# Patient Record
Sex: MALE | Race: WHITE | NOT HISPANIC OR LATINO | URBAN - METROPOLITAN AREA
[De-identification: names, ages, dates, MRNs, and addresses within clinical notes are randomized per-mention and may not be internally consistent; named-entity substitution may affect disease eponyms.]

---

## 2020-01-01 ENCOUNTER — INPATIENT (INPATIENT)
Facility: HOSPITAL | Age: 70
LOS: 12 days | DRG: 870 | End: 2020-01-16
Attending: INTERNAL MEDICINE | Admitting: INTERNAL MEDICINE
Payer: MEDICARE

## 2020-01-01 VITALS
DIASTOLIC BLOOD PRESSURE: 96 MMHG | SYSTOLIC BLOOD PRESSURE: 133 MMHG | RESPIRATION RATE: 26 BRPM | WEIGHT: 300.05 LBS | OXYGEN SATURATION: 92 % | HEIGHT: 71 IN | HEART RATE: 112 BPM | TEMPERATURE: 99 F

## 2020-01-01 DIAGNOSIS — I48.91 UNSPECIFIED ATRIAL FIBRILLATION: ICD-10-CM

## 2020-01-01 DIAGNOSIS — Z72.89 OTHER PROBLEMS RELATED TO LIFESTYLE: ICD-10-CM

## 2020-01-01 DIAGNOSIS — R53.81 OTHER MALAISE: ICD-10-CM

## 2020-01-01 DIAGNOSIS — A41.9 SEPSIS, UNSPECIFIED ORGANISM: ICD-10-CM

## 2020-01-01 DIAGNOSIS — D72.819 DECREASED WHITE BLOOD CELL COUNT, UNSPECIFIED: ICD-10-CM

## 2020-01-01 DIAGNOSIS — E87.2 ACIDOSIS: ICD-10-CM

## 2020-01-01 DIAGNOSIS — R74.0 NONSPECIFIC ELEVATION OF LEVELS OF TRANSAMINASE AND LACTIC ACID DEHYDROGENASE [LDH]: ICD-10-CM

## 2020-01-01 DIAGNOSIS — D69.6 THROMBOCYTOPENIA, UNSPECIFIED: ICD-10-CM

## 2020-01-01 DIAGNOSIS — J96.02 ACUTE RESPIRATORY FAILURE WITH HYPERCAPNIA: ICD-10-CM

## 2020-01-01 DIAGNOSIS — I50.9 HEART FAILURE, UNSPECIFIED: ICD-10-CM

## 2020-01-01 DIAGNOSIS — R94.5 ABNORMAL RESULTS OF LIVER FUNCTION STUDIES: ICD-10-CM

## 2020-01-01 DIAGNOSIS — G93.40 ENCEPHALOPATHY, UNSPECIFIED: ICD-10-CM

## 2020-01-01 DIAGNOSIS — J44.1 CHRONIC OBSTRUCTIVE PULMONARY DISEASE WITH (ACUTE) EXACERBATION: ICD-10-CM

## 2020-01-01 DIAGNOSIS — F10.239 ALCOHOL DEPENDENCE WITH WITHDRAWAL, UNSPECIFIED: ICD-10-CM

## 2020-01-01 DIAGNOSIS — R91.8 OTHER NONSPECIFIC ABNORMAL FINDING OF LUNG FIELD: ICD-10-CM

## 2020-01-01 DIAGNOSIS — C79.9 SECONDARY MALIGNANT NEOPLASM OF UNSPECIFIED SITE: ICD-10-CM

## 2020-01-01 DIAGNOSIS — K92.1 MELENA: ICD-10-CM

## 2020-01-01 DIAGNOSIS — Z51.5 ENCOUNTER FOR PALLIATIVE CARE: ICD-10-CM

## 2020-01-01 DIAGNOSIS — I50.89 OTHER HEART FAILURE: ICD-10-CM

## 2020-01-01 DIAGNOSIS — I10 ESSENTIAL (PRIMARY) HYPERTENSION: ICD-10-CM

## 2020-01-01 DIAGNOSIS — E66.01 MORBID (SEVERE) OBESITY DUE TO EXCESS CALORIES: ICD-10-CM

## 2020-01-01 DIAGNOSIS — Z29.9 ENCOUNTER FOR PROPHYLACTIC MEASURES, UNSPECIFIED: ICD-10-CM

## 2020-01-01 DIAGNOSIS — E87.1 HYPO-OSMOLALITY AND HYPONATREMIA: ICD-10-CM

## 2020-01-01 LAB
% ALBUMIN: 44.9 % — SIGNIFICANT CHANGE UP
% ALPHA 1: 9.6 % — SIGNIFICANT CHANGE UP
% ALPHA 2: 17.4 % — SIGNIFICANT CHANGE UP
% BETA: 13.9 % — SIGNIFICANT CHANGE UP
% GAMMA, URINE: 11.3 % — SIGNIFICANT CHANGE UP
% GAMMA: 14.2 % — SIGNIFICANT CHANGE UP
-  K. PNEUMONIAE GROUP: SIGNIFICANT CHANGE UP
24R-OH-CALCIDIOL SERPL-MCNC: 26.8 NG/ML — LOW (ref 30–80)
ABO RH CONFIRMATION: SIGNIFICANT CHANGE UP
ADAMTS13 ACTIVITY: >100 % — SIGNIFICANT CHANGE UP
ADAMTS13-COMMENT: SIGNIFICANT CHANGE UP
ALBUMIN 24H MFR UR ELPH: 26.1 % — SIGNIFICANT CHANGE UP
ALBUMIN SERPL ELPH-MCNC: 1.5 G/DL — LOW (ref 3.5–5)
ALBUMIN SERPL ELPH-MCNC: 1.5 G/DL — LOW (ref 3.5–5)
ALBUMIN SERPL ELPH-MCNC: 1.6 G/DL — LOW (ref 3.5–5)
ALBUMIN SERPL ELPH-MCNC: 1.7 G/DL — LOW (ref 3.5–5)
ALBUMIN SERPL ELPH-MCNC: 1.8 G/DL — LOW (ref 3.5–5)
ALBUMIN SERPL ELPH-MCNC: 1.9 G/DL — LOW (ref 3.5–5)
ALBUMIN SERPL ELPH-MCNC: 2.2 G/DL — LOW (ref 3.5–5)
ALBUMIN SERPL ELPH-MCNC: 2.3 G/DL — LOW (ref 3.6–5.5)
ALBUMIN SERPL ELPH-MCNC: 2.7 G/DL — LOW (ref 3.5–5)
ALBUMIN SERPL ELPH-MCNC: 2.8 G/DL — LOW (ref 3.5–5)
ALBUMIN SERPL ELPH-MCNC: 3 G/DL — LOW (ref 3.5–5)
ALBUMIN/GLOB SERPL ELPH: 0.8 RATIO — SIGNIFICANT CHANGE UP
ALP SERPL-CCNC: 182 U/L — HIGH (ref 40–120)
ALP SERPL-CCNC: 195 U/L — HIGH (ref 40–120)
ALP SERPL-CCNC: 203 U/L — HIGH (ref 40–120)
ALP SERPL-CCNC: 212 U/L — HIGH (ref 40–120)
ALP SERPL-CCNC: 213 U/L — HIGH (ref 40–120)
ALP SERPL-CCNC: 216 U/L — HIGH (ref 40–120)
ALP SERPL-CCNC: 217 U/L — HIGH (ref 40–120)
ALP SERPL-CCNC: 219 U/L — HIGH (ref 40–120)
ALP SERPL-CCNC: 227 U/L — HIGH (ref 40–120)
ALP SERPL-CCNC: 243 U/L — HIGH (ref 40–120)
ALP SERPL-CCNC: 246 U/L — HIGH (ref 40–120)
ALP SERPL-CCNC: 263 U/L — HIGH (ref 40–120)
ALPHA1 GLOB 24H MFR UR ELPH: 28.3 % — SIGNIFICANT CHANGE UP
ALPHA1 GLOB SERPL ELPH-MCNC: 0.5 G/DL — HIGH (ref 0.1–0.4)
ALPHA2 GLOB 24H MFR UR ELPH: 18.7 % — SIGNIFICANT CHANGE UP
ALPHA2 GLOB SERPL ELPH-MCNC: 0.9 G/DL — SIGNIFICANT CHANGE UP (ref 0.5–1)
ALT FLD-CCNC: 170 U/L DA — HIGH (ref 10–60)
ALT FLD-CCNC: 173 U/L DA — HIGH (ref 10–60)
ALT FLD-CCNC: 176 U/L DA — HIGH (ref 10–60)
ALT FLD-CCNC: 188 U/L DA — HIGH (ref 10–60)
ALT FLD-CCNC: 190 U/L DA — HIGH (ref 10–60)
ALT FLD-CCNC: 193 U/L DA — HIGH (ref 10–60)
ALT FLD-CCNC: 204 U/L DA — HIGH (ref 10–60)
ALT FLD-CCNC: 228 U/L DA — HIGH (ref 10–60)
ALT FLD-CCNC: 389 U/L DA — HIGH (ref 10–60)
ALT FLD-CCNC: 399 U/L DA — HIGH (ref 10–60)
ALT FLD-CCNC: 411 U/L DA — HIGH (ref 10–60)
ALT FLD-CCNC: 416 U/L DA — HIGH (ref 10–60)
AMMONIA BLD-MCNC: 101 UMOL/L — HIGH (ref 11–32)
AMMONIA BLD-MCNC: 64 UMOL/L — HIGH (ref 11–32)
ANION GAP SERPL CALC-SCNC: 10 MMOL/L — SIGNIFICANT CHANGE UP (ref 5–17)
ANION GAP SERPL CALC-SCNC: 10 MMOL/L — SIGNIFICANT CHANGE UP (ref 5–17)
ANION GAP SERPL CALC-SCNC: 11 MMOL/L — SIGNIFICANT CHANGE UP (ref 5–17)
ANION GAP SERPL CALC-SCNC: 12 MMOL/L — SIGNIFICANT CHANGE UP (ref 5–17)
ANION GAP SERPL CALC-SCNC: 12 MMOL/L — SIGNIFICANT CHANGE UP (ref 5–17)
ANION GAP SERPL CALC-SCNC: 15 MMOL/L — SIGNIFICANT CHANGE UP (ref 5–17)
ANION GAP SERPL CALC-SCNC: 5 MMOL/L — SIGNIFICANT CHANGE UP (ref 5–17)
ANION GAP SERPL CALC-SCNC: 5 MMOL/L — SIGNIFICANT CHANGE UP (ref 5–17)
ANION GAP SERPL CALC-SCNC: 6 MMOL/L — SIGNIFICANT CHANGE UP (ref 5–17)
ANION GAP SERPL CALC-SCNC: 6 MMOL/L — SIGNIFICANT CHANGE UP (ref 5–17)
ANION GAP SERPL CALC-SCNC: 7 MMOL/L — SIGNIFICANT CHANGE UP (ref 5–17)
ANION GAP SERPL CALC-SCNC: 7 MMOL/L — SIGNIFICANT CHANGE UP (ref 5–17)
ANION GAP SERPL CALC-SCNC: 8 MMOL/L — SIGNIFICANT CHANGE UP (ref 5–17)
ANION GAP SERPL CALC-SCNC: 9 MMOL/L — SIGNIFICANT CHANGE UP (ref 5–17)
ANISOCYTOSIS BLD QL: SLIGHT — SIGNIFICANT CHANGE UP
ANISOCYTOSIS BLD QL: SLIGHT — SIGNIFICANT CHANGE UP
APPEARANCE UR: ABNORMAL
APTT BLD: 103.9 SEC — HIGH (ref 27.5–36.3)
APTT BLD: 112.9 SEC — HIGH (ref 27.5–36.3)
APTT BLD: 113.8 SEC — HIGH (ref 27.5–36.3)
APTT BLD: 30.9 SEC — SIGNIFICANT CHANGE UP (ref 27.5–36.3)
APTT BLD: 47.4 SEC — HIGH (ref 27.5–36.3)
APTT BLD: 82.4 SEC — HIGH (ref 27.5–36.3)
APTT BLD: >200 SEC — CRITICAL HIGH (ref 27.5–36.3)
AST SERPL-CCNC: 222 U/L — HIGH (ref 10–40)
AST SERPL-CCNC: 228 U/L — HIGH (ref 10–40)
AST SERPL-CCNC: 245 U/L — HIGH (ref 10–40)
AST SERPL-CCNC: 248 U/L — HIGH (ref 10–40)
AST SERPL-CCNC: 264 U/L — HIGH (ref 10–40)
AST SERPL-CCNC: 279 U/L — HIGH (ref 10–40)
AST SERPL-CCNC: 344 U/L — HIGH (ref 10–40)
AST SERPL-CCNC: 345 U/L — HIGH (ref 10–40)
AST SERPL-CCNC: 371 U/L — HIGH (ref 10–40)
AST SERPL-CCNC: 413 U/L — HIGH (ref 10–40)
AST SERPL-CCNC: 447 U/L — HIGH (ref 10–40)
AST SERPL-CCNC: 453 U/L — HIGH (ref 10–40)
B-GLOBULIN 24H MFR UR ELPH: 15.6 % — SIGNIFICANT CHANGE UP
B-GLOBULIN SERPL ELPH-MCNC: 0.7 G/DL — SIGNIFICANT CHANGE UP (ref 0.5–1)
BACTERIA # UR AUTO: ABNORMAL /HPF
BACTERIA # UR AUTO: ABNORMAL /HPF
BASE EXCESS BLDA CALC-SCNC: -0.1 MMOL/L — SIGNIFICANT CHANGE UP (ref -2–2)
BASE EXCESS BLDA CALC-SCNC: -3.3 MMOL/L — LOW (ref -2–2)
BASE EXCESS BLDA CALC-SCNC: 2.3 MMOL/L — HIGH (ref -2–2)
BASE EXCESS BLDA CALC-SCNC: 2.8 MMOL/L — HIGH (ref -2–2)
BASE EXCESS BLDA CALC-SCNC: 2.9 MMOL/L — HIGH (ref -2–2)
BASE EXCESS BLDA CALC-SCNC: 3 MMOL/L — HIGH (ref -2–2)
BASE EXCESS BLDA CALC-SCNC: 3.4 MMOL/L — HIGH (ref -2–2)
BASE EXCESS BLDA CALC-SCNC: 3.4 MMOL/L — HIGH (ref -2–2)
BASE EXCESS BLDA CALC-SCNC: 5.8 MMOL/L — HIGH (ref -2–2)
BASE EXCESS BLDA CALC-SCNC: 5.9 MMOL/L — HIGH (ref -2–2)
BASE EXCESS BLDV CALC-SCNC: 2.3 MMOL/L — HIGH (ref -2–2)
BASOPHILS # BLD AUTO: 0 K/UL — SIGNIFICANT CHANGE UP (ref 0–0.2)
BASOPHILS # BLD AUTO: 0.04 K/UL — SIGNIFICANT CHANGE UP (ref 0–0.2)
BASOPHILS # BLD AUTO: 0.04 K/UL — SIGNIFICANT CHANGE UP (ref 0–0.2)
BASOPHILS # BLD AUTO: SIGNIFICANT CHANGE UP K/UL (ref 0–0.2)
BASOPHILS NFR BLD AUTO: 0 % — SIGNIFICANT CHANGE UP (ref 0–2)
BASOPHILS NFR BLD AUTO: 0.7 % — SIGNIFICANT CHANGE UP (ref 0–2)
BASOPHILS NFR BLD AUTO: 1 % — SIGNIFICANT CHANGE UP (ref 0–2)
BASOPHILS NFR BLD AUTO: SIGNIFICANT CHANGE UP % (ref 0–2)
BILIRUB SERPL-MCNC: 4 MG/DL — HIGH (ref 0.2–1.2)
BILIRUB SERPL-MCNC: 4.3 MG/DL — HIGH (ref 0.2–1.2)
BILIRUB SERPL-MCNC: 5.3 MG/DL — HIGH (ref 0.2–1.2)
BILIRUB SERPL-MCNC: 5.5 MG/DL — HIGH (ref 0.2–1.2)
BILIRUB SERPL-MCNC: 5.8 MG/DL — HIGH (ref 0.2–1.2)
BILIRUB SERPL-MCNC: 6.1 MG/DL — HIGH (ref 0.2–1.2)
BILIRUB SERPL-MCNC: 6.4 MG/DL — HIGH (ref 0.2–1.2)
BILIRUB SERPL-MCNC: 6.5 MG/DL — HIGH (ref 0.2–1.2)
BILIRUB SERPL-MCNC: 6.5 MG/DL — HIGH (ref 0.2–1.2)
BILIRUB SERPL-MCNC: 7.2 MG/DL — HIGH (ref 0.2–1.2)
BILIRUB SERPL-MCNC: 7.6 MG/DL — HIGH (ref 0.2–1.2)
BILIRUB SERPL-MCNC: 8.2 MG/DL — HIGH (ref 0.2–1.2)
BILIRUB UR-MCNC: ABNORMAL
BLOOD GAS COMMENTS ARTERIAL: SIGNIFICANT CHANGE UP
BLOOD GAS COMMENTS, VENOUS: SIGNIFICANT CHANGE UP
BLOOD GAS COMMENTS, VENOUS: SIGNIFICANT CHANGE UP
BUN SERPL-MCNC: 13 MG/DL — SIGNIFICANT CHANGE UP (ref 7–18)
BUN SERPL-MCNC: 16 MG/DL — SIGNIFICANT CHANGE UP (ref 7–18)
BUN SERPL-MCNC: 18 MG/DL — SIGNIFICANT CHANGE UP (ref 7–18)
BUN SERPL-MCNC: 19 MG/DL — HIGH (ref 7–18)
BUN SERPL-MCNC: 20 MG/DL — HIGH (ref 7–18)
BUN SERPL-MCNC: 24 MG/DL — HIGH (ref 7–18)
BUN SERPL-MCNC: 30 MG/DL — HIGH (ref 7–18)
BUN SERPL-MCNC: 43 MG/DL — HIGH (ref 7–18)
BUN SERPL-MCNC: 57 MG/DL — HIGH (ref 7–18)
BUN SERPL-MCNC: 59 MG/DL — HIGH (ref 7–18)
BUN SERPL-MCNC: 61 MG/DL — HIGH (ref 7–18)
BUN SERPL-MCNC: 62 MG/DL — HIGH (ref 7–18)
BUN SERPL-MCNC: 64 MG/DL — HIGH (ref 7–18)
BUN SERPL-MCNC: 65 MG/DL — HIGH (ref 7–18)
BUN SERPL-MCNC: 66 MG/DL — HIGH (ref 7–18)
BUN SERPL-MCNC: 70 MG/DL — HIGH (ref 7–18)
BUN SERPL-MCNC: 71 MG/DL — HIGH (ref 7–18)
BUN SERPL-MCNC: 76 MG/DL — HIGH (ref 7–18)
BUN SERPL-MCNC: 82 MG/DL — HIGH (ref 7–18)
BUN SERPL-MCNC: 90 MG/DL — HIGH (ref 7–18)
BUN SERPL-MCNC: 90 MG/DL — HIGH (ref 7–18)
BUN SERPL-MCNC: 95 MG/DL — HIGH (ref 7–18)
CA-I BLD-SCNC: 1.7 MMOL/L — CRITICAL HIGH (ref 1.12–1.3)
CALCIUM SERPL-MCNC: 10.1 MG/DL — SIGNIFICANT CHANGE UP (ref 8.4–10.5)
CALCIUM SERPL-MCNC: 10.1 MG/DL — SIGNIFICANT CHANGE UP (ref 8.4–10.5)
CALCIUM SERPL-MCNC: 10.2 MG/DL — SIGNIFICANT CHANGE UP (ref 8.4–10.5)
CALCIUM SERPL-MCNC: 10.3 MG/DL — SIGNIFICANT CHANGE UP (ref 8.4–10.5)
CALCIUM SERPL-MCNC: 10.5 MG/DL — SIGNIFICANT CHANGE UP (ref 8.4–10.5)
CALCIUM SERPL-MCNC: 10.6 MG/DL — HIGH (ref 8.4–10.5)
CALCIUM SERPL-MCNC: 10.7 MG/DL — HIGH (ref 8.4–10.5)
CALCIUM SERPL-MCNC: 11 MG/DL — HIGH (ref 8.4–10.5)
CALCIUM SERPL-MCNC: 11 MG/DL — HIGH (ref 8.4–10.5)
CALCIUM SERPL-MCNC: 11.1 MG/DL — HIGH (ref 8.4–10.5)
CALCIUM SERPL-MCNC: 11.2 MG/DL — HIGH (ref 8.4–10.5)
CALCIUM SERPL-MCNC: 11.3 MG/DL — HIGH (ref 8.4–10.5)
CALCIUM SERPL-MCNC: 11.3 MG/DL — HIGH (ref 8.4–10.5)
CALCIUM SERPL-MCNC: 11.5 MG/DL — HIGH (ref 8.4–10.5)
CALCIUM SERPL-MCNC: 11.6 MG/DL — HIGH (ref 8.4–10.5)
CALCIUM SERPL-MCNC: 11.8 MG/DL — HIGH (ref 8.4–10.5)
CALCIUM SERPL-MCNC: 11.8 MG/DL — HIGH (ref 8.4–10.5)
CALCIUM SERPL-MCNC: 11.9 MG/DL — HIGH (ref 8.4–10.5)
CALCIUM SERPL-MCNC: 12 MG/DL — HIGH (ref 8.4–10.5)
CALCIUM SERPL-MCNC: 12 MG/DL — HIGH (ref 8.4–10.5)
CALCIUM SERPL-MCNC: 12.3 MG/DL — HIGH (ref 8.4–10.5)
CALCIUM SERPL-MCNC: 12.3 MG/DL — HIGH (ref 8.4–10.5)
CALCIUM SERPL-MCNC: 12.5 MG/DL — HIGH (ref 8.4–10.5)
CALCIUM SERPL-MCNC: 9.5 MG/DL — SIGNIFICANT CHANGE UP (ref 8.4–10.5)
CALCIUM SERPL-MCNC: 9.8 MG/DL — SIGNIFICANT CHANGE UP (ref 8.4–10.5)
CEA SERPL-MCNC: 13.4 NG/ML — HIGH (ref 0–3.8)
CHLORIDE SERPL-SCNC: 102 MMOL/L — SIGNIFICANT CHANGE UP (ref 96–108)
CHLORIDE SERPL-SCNC: 102 MMOL/L — SIGNIFICANT CHANGE UP (ref 96–108)
CHLORIDE SERPL-SCNC: 107 MMOL/L — SIGNIFICANT CHANGE UP (ref 96–108)
CHLORIDE SERPL-SCNC: 108 MMOL/L — SIGNIFICANT CHANGE UP (ref 96–108)
CHLORIDE SERPL-SCNC: 109 MMOL/L — HIGH (ref 96–108)
CHLORIDE SERPL-SCNC: 110 MMOL/L — HIGH (ref 96–108)
CHLORIDE SERPL-SCNC: 111 MMOL/L — HIGH (ref 96–108)
CHLORIDE SERPL-SCNC: 111 MMOL/L — HIGH (ref 96–108)
CHLORIDE SERPL-SCNC: 112 MMOL/L — HIGH (ref 96–108)
CHLORIDE SERPL-SCNC: 112 MMOL/L — HIGH (ref 96–108)
CHLORIDE SERPL-SCNC: 113 MMOL/L — HIGH (ref 96–108)
CHLORIDE SERPL-SCNC: 115 MMOL/L — HIGH (ref 96–108)
CHLORIDE SERPL-SCNC: 116 MMOL/L — HIGH (ref 96–108)
CHLORIDE SERPL-SCNC: 117 MMOL/L — HIGH (ref 96–108)
CHLORIDE SERPL-SCNC: 86 MMOL/L — LOW (ref 96–108)
CHLORIDE SERPL-SCNC: 87 MMOL/L — LOW (ref 96–108)
CHLORIDE SERPL-SCNC: 89 MMOL/L — LOW (ref 96–108)
CHLORIDE SERPL-SCNC: 91 MMOL/L — LOW (ref 96–108)
CHLORIDE SERPL-SCNC: 94 MMOL/L — LOW (ref 96–108)
CHLORIDE SERPL-SCNC: 98 MMOL/L — SIGNIFICANT CHANGE UP (ref 96–108)
CHLORIDE UR-SCNC: <10 MMOL/L — LOW (ref 55–125)
CHOLEST SERPL-MCNC: 208 MG/DL — HIGH (ref 10–199)
CK MB BLD-MCNC: 0.7 % — SIGNIFICANT CHANGE UP (ref 0–3.5)
CK MB BLD-MCNC: 0.8 % — SIGNIFICANT CHANGE UP (ref 0–3.5)
CK MB BLD-MCNC: 0.9 % — SIGNIFICANT CHANGE UP (ref 0–3.5)
CK MB BLD-MCNC: 1.9 % — SIGNIFICANT CHANGE UP (ref 0–3.5)
CK MB BLD-MCNC: 1.9 % — SIGNIFICANT CHANGE UP (ref 0–3.5)
CK MB CFR SERPL CALC: 1.4 NG/ML — SIGNIFICANT CHANGE UP (ref 0–3.6)
CK MB CFR SERPL CALC: 2.2 NG/ML — SIGNIFICANT CHANGE UP (ref 0–3.6)
CK MB CFR SERPL CALC: 2.4 NG/ML — SIGNIFICANT CHANGE UP (ref 0–3.6)
CK MB CFR SERPL CALC: 2.7 NG/ML — SIGNIFICANT CHANGE UP (ref 0–3.6)
CK MB CFR SERPL CALC: 3.2 NG/ML — SIGNIFICANT CHANGE UP (ref 0–3.6)
CK MB CFR SERPL CALC: 7.1 NG/ML — HIGH (ref 0–3.6)
CK MB CFR SERPL CALC: 8.2 NG/ML — HIGH (ref 0–3.6)
CK SERPL-CCNC: 186 U/L — SIGNIFICANT CHANGE UP (ref 35–232)
CK SERPL-CCNC: 201 U/L — SIGNIFICANT CHANGE UP (ref 35–232)
CK SERPL-CCNC: 252 U/L — HIGH (ref 35–232)
CK SERPL-CCNC: 334 U/L — HIGH (ref 35–232)
CK SERPL-CCNC: 344 U/L — HIGH (ref 35–232)
CK SERPL-CCNC: 370 U/L — HIGH (ref 35–232)
CK SERPL-CCNC: 400 U/L — HIGH (ref 35–232)
CK SERPL-CCNC: 436 U/L — HIGH (ref 35–232)
CO2 SERPL-SCNC: 22 MMOL/L — SIGNIFICANT CHANGE UP (ref 22–31)
CO2 SERPL-SCNC: 23 MMOL/L — SIGNIFICANT CHANGE UP (ref 22–31)
CO2 SERPL-SCNC: 25 MMOL/L — SIGNIFICANT CHANGE UP (ref 22–31)
CO2 SERPL-SCNC: 27 MMOL/L — SIGNIFICANT CHANGE UP (ref 22–31)
CO2 SERPL-SCNC: 28 MMOL/L — SIGNIFICANT CHANGE UP (ref 22–31)
CO2 SERPL-SCNC: 29 MMOL/L — SIGNIFICANT CHANGE UP (ref 22–31)
CO2 SERPL-SCNC: 30 MMOL/L — SIGNIFICANT CHANGE UP (ref 22–31)
CO2 SERPL-SCNC: 31 MMOL/L — SIGNIFICANT CHANGE UP (ref 22–31)
CO2 SERPL-SCNC: 31 MMOL/L — SIGNIFICANT CHANGE UP (ref 22–31)
CO2 SERPL-SCNC: 32 MMOL/L — HIGH (ref 22–31)
CO2 SERPL-SCNC: 33 MMOL/L — HIGH (ref 22–31)
COLOR SPEC: ABNORMAL
COLOR SPEC: ABNORMAL
COLOR SPEC: YELLOW — SIGNIFICANT CHANGE UP
COMMENT - URINE: SIGNIFICANT CHANGE UP
CREAT ?TM UR-MCNC: 88 MG/DL — SIGNIFICANT CHANGE UP
CREAT SERPL-MCNC: 0.7 MG/DL — SIGNIFICANT CHANGE UP (ref 0.5–1.3)
CREAT SERPL-MCNC: 0.73 MG/DL — SIGNIFICANT CHANGE UP (ref 0.5–1.3)
CREAT SERPL-MCNC: 0.76 MG/DL — SIGNIFICANT CHANGE UP (ref 0.5–1.3)
CREAT SERPL-MCNC: 0.78 MG/DL — SIGNIFICANT CHANGE UP (ref 0.5–1.3)
CREAT SERPL-MCNC: 0.78 MG/DL — SIGNIFICANT CHANGE UP (ref 0.5–1.3)
CREAT SERPL-MCNC: 0.83 MG/DL — SIGNIFICANT CHANGE UP (ref 0.5–1.3)
CREAT SERPL-MCNC: 0.88 MG/DL — SIGNIFICANT CHANGE UP (ref 0.5–1.3)
CREAT SERPL-MCNC: 0.88 MG/DL — SIGNIFICANT CHANGE UP (ref 0.5–1.3)
CREAT SERPL-MCNC: 0.95 MG/DL — SIGNIFICANT CHANGE UP (ref 0.5–1.3)
CREAT SERPL-MCNC: 1.01 MG/DL — SIGNIFICANT CHANGE UP (ref 0.5–1.3)
CREAT SERPL-MCNC: 1.02 MG/DL — SIGNIFICANT CHANGE UP (ref 0.5–1.3)
CREAT SERPL-MCNC: 1.04 MG/DL — SIGNIFICANT CHANGE UP (ref 0.5–1.3)
CREAT SERPL-MCNC: 1.08 MG/DL — SIGNIFICANT CHANGE UP (ref 0.5–1.3)
CREAT SERPL-MCNC: 1.09 MG/DL — SIGNIFICANT CHANGE UP (ref 0.5–1.3)
CREAT SERPL-MCNC: 1.1 MG/DL — SIGNIFICANT CHANGE UP (ref 0.5–1.3)
CREAT SERPL-MCNC: 1.1 MG/DL — SIGNIFICANT CHANGE UP (ref 0.5–1.3)
CREAT SERPL-MCNC: 1.3 MG/DL — SIGNIFICANT CHANGE UP (ref 0.5–1.3)
CREAT SERPL-MCNC: 1.4 MG/DL — HIGH (ref 0.5–1.3)
CREAT SERPL-MCNC: 1.49 MG/DL — HIGH (ref 0.5–1.3)
CREAT SERPL-MCNC: 1.64 MG/DL — HIGH (ref 0.5–1.3)
CREAT SERPL-MCNC: 1.9 MG/DL — HIGH (ref 0.5–1.3)
CREAT SERPL-MCNC: 1.97 MG/DL — HIGH (ref 0.5–1.3)
CREAT SERPL-MCNC: 2.02 MG/DL — HIGH (ref 0.5–1.3)
CREAT SERPL-MCNC: 2.15 MG/DL — HIGH (ref 0.5–1.3)
CREATININE, URINE RESULT: 55 MG/DL — SIGNIFICANT CHANGE UP
CULTURE RESULTS: NO GROWTH — SIGNIFICANT CHANGE UP
CULTURE RESULTS: SIGNIFICANT CHANGE UP
D DIMER BLD IA.RAPID-MCNC: 1978 NG/ML DDU — HIGH
DIFF PNL FLD: ABNORMAL
EOSINOPHIL # BLD AUTO: 0 K/UL — SIGNIFICANT CHANGE UP (ref 0–0.5)
EOSINOPHIL # BLD AUTO: 0.01 K/UL — SIGNIFICANT CHANGE UP (ref 0–0.5)
EOSINOPHIL # BLD AUTO: 0.04 K/UL — SIGNIFICANT CHANGE UP (ref 0–0.5)
EOSINOPHIL # BLD AUTO: SIGNIFICANT CHANGE UP K/UL (ref 0–0.5)
EOSINOPHIL NFR BLD AUTO: 0 % — SIGNIFICANT CHANGE UP (ref 0–6)
EOSINOPHIL NFR BLD AUTO: 0.2 % — SIGNIFICANT CHANGE UP (ref 0–6)
EOSINOPHIL NFR BLD AUTO: 1 % — SIGNIFICANT CHANGE UP (ref 0–6)
EOSINOPHIL NFR BLD AUTO: SIGNIFICANT CHANGE UP % (ref 0–6)
EOSINOPHIL NFR URNS MANUAL: NEGATIVE — SIGNIFICANT CHANGE UP
EPI CELLS # UR: ABNORMAL /HPF
EPI CELLS # UR: ABNORMAL /HPF
FIBRINOGEN PPP-MCNC: 440 MG/DL — SIGNIFICANT CHANGE UP (ref 350–510)
FLU A RESULT: SIGNIFICANT CHANGE UP
FLUAV AG NPH QL: SIGNIFICANT CHANGE UP
FLUAV AG NPH QL: SIGNIFICANT CHANGE UP
FLUBV AG NPH QL: SIGNIFICANT CHANGE UP
FLUBV AG NPH QL: SIGNIFICANT CHANGE UP
FOLATE SERPL-MCNC: 8.2 NG/ML — SIGNIFICANT CHANGE UP
GAMMA GLOBULIN: 0.7 G/DL — SIGNIFICANT CHANGE UP (ref 0.6–1.6)
GLUCOSE BLDC GLUCOMTR-MCNC: 100 MG/DL — HIGH (ref 70–99)
GLUCOSE BLDC GLUCOMTR-MCNC: 101 MG/DL — HIGH (ref 70–99)
GLUCOSE BLDC GLUCOMTR-MCNC: 101 MG/DL — HIGH (ref 70–99)
GLUCOSE BLDC GLUCOMTR-MCNC: 102 MG/DL — HIGH (ref 70–99)
GLUCOSE BLDC GLUCOMTR-MCNC: 107 MG/DL — HIGH (ref 70–99)
GLUCOSE BLDC GLUCOMTR-MCNC: 109 MG/DL — HIGH (ref 70–99)
GLUCOSE BLDC GLUCOMTR-MCNC: 109 MG/DL — HIGH (ref 70–99)
GLUCOSE BLDC GLUCOMTR-MCNC: 110 MG/DL — HIGH (ref 70–99)
GLUCOSE BLDC GLUCOMTR-MCNC: 110 MG/DL — HIGH (ref 70–99)
GLUCOSE BLDC GLUCOMTR-MCNC: 111 MG/DL — HIGH (ref 70–99)
GLUCOSE BLDC GLUCOMTR-MCNC: 112 MG/DL — HIGH (ref 70–99)
GLUCOSE BLDC GLUCOMTR-MCNC: 113 MG/DL — HIGH (ref 70–99)
GLUCOSE BLDC GLUCOMTR-MCNC: 113 MG/DL — HIGH (ref 70–99)
GLUCOSE BLDC GLUCOMTR-MCNC: 119 MG/DL — HIGH (ref 70–99)
GLUCOSE BLDC GLUCOMTR-MCNC: 119 MG/DL — HIGH (ref 70–99)
GLUCOSE BLDC GLUCOMTR-MCNC: 120 MG/DL — HIGH (ref 70–99)
GLUCOSE BLDC GLUCOMTR-MCNC: 121 MG/DL — HIGH (ref 70–99)
GLUCOSE BLDC GLUCOMTR-MCNC: 122 MG/DL — HIGH (ref 70–99)
GLUCOSE BLDC GLUCOMTR-MCNC: 122 MG/DL — HIGH (ref 70–99)
GLUCOSE BLDC GLUCOMTR-MCNC: 125 MG/DL — HIGH (ref 70–99)
GLUCOSE BLDC GLUCOMTR-MCNC: 125 MG/DL — HIGH (ref 70–99)
GLUCOSE BLDC GLUCOMTR-MCNC: 127 MG/DL — HIGH (ref 70–99)
GLUCOSE BLDC GLUCOMTR-MCNC: 129 MG/DL — HIGH (ref 70–99)
GLUCOSE BLDC GLUCOMTR-MCNC: 130 MG/DL — HIGH (ref 70–99)
GLUCOSE BLDC GLUCOMTR-MCNC: 131 MG/DL — HIGH (ref 70–99)
GLUCOSE BLDC GLUCOMTR-MCNC: 139 MG/DL — HIGH (ref 70–99)
GLUCOSE BLDC GLUCOMTR-MCNC: 145 MG/DL — HIGH (ref 70–99)
GLUCOSE BLDC GLUCOMTR-MCNC: 146 MG/DL — HIGH (ref 70–99)
GLUCOSE BLDC GLUCOMTR-MCNC: 147 MG/DL — HIGH (ref 70–99)
GLUCOSE BLDC GLUCOMTR-MCNC: 151 MG/DL — HIGH (ref 70–99)
GLUCOSE BLDC GLUCOMTR-MCNC: 152 MG/DL — HIGH (ref 70–99)
GLUCOSE BLDC GLUCOMTR-MCNC: 153 MG/DL — HIGH (ref 70–99)
GLUCOSE BLDC GLUCOMTR-MCNC: 166 MG/DL — HIGH (ref 70–99)
GLUCOSE BLDC GLUCOMTR-MCNC: 168 MG/DL — HIGH (ref 70–99)
GLUCOSE BLDC GLUCOMTR-MCNC: 169 MG/DL — HIGH (ref 70–99)
GLUCOSE BLDC GLUCOMTR-MCNC: 169 MG/DL — HIGH (ref 70–99)
GLUCOSE BLDC GLUCOMTR-MCNC: 21 MG/DL — CRITICAL LOW (ref 70–99)
GLUCOSE BLDC GLUCOMTR-MCNC: 22 MG/DL — CRITICAL LOW (ref 70–99)
GLUCOSE BLDC GLUCOMTR-MCNC: 62 MG/DL — LOW (ref 70–99)
GLUCOSE BLDC GLUCOMTR-MCNC: 72 MG/DL — SIGNIFICANT CHANGE UP (ref 70–99)
GLUCOSE BLDC GLUCOMTR-MCNC: 74 MG/DL — SIGNIFICANT CHANGE UP (ref 70–99)
GLUCOSE BLDC GLUCOMTR-MCNC: 77 MG/DL — SIGNIFICANT CHANGE UP (ref 70–99)
GLUCOSE BLDC GLUCOMTR-MCNC: 81 MG/DL — SIGNIFICANT CHANGE UP (ref 70–99)
GLUCOSE BLDC GLUCOMTR-MCNC: 82 MG/DL — SIGNIFICANT CHANGE UP (ref 70–99)
GLUCOSE BLDC GLUCOMTR-MCNC: 91 MG/DL — SIGNIFICANT CHANGE UP (ref 70–99)
GLUCOSE BLDC GLUCOMTR-MCNC: 92 MG/DL — SIGNIFICANT CHANGE UP (ref 70–99)
GLUCOSE BLDC GLUCOMTR-MCNC: 96 MG/DL — SIGNIFICANT CHANGE UP (ref 70–99)
GLUCOSE BLDC GLUCOMTR-MCNC: 98 MG/DL — SIGNIFICANT CHANGE UP (ref 70–99)
GLUCOSE BLDC GLUCOMTR-MCNC: 99 MG/DL — SIGNIFICANT CHANGE UP (ref 70–99)
GLUCOSE SERPL-MCNC: 102 MG/DL — HIGH (ref 70–99)
GLUCOSE SERPL-MCNC: 113 MG/DL — HIGH (ref 70–99)
GLUCOSE SERPL-MCNC: 114 MG/DL — HIGH (ref 70–99)
GLUCOSE SERPL-MCNC: 119 MG/DL — HIGH (ref 70–99)
GLUCOSE SERPL-MCNC: 130 MG/DL — HIGH (ref 70–99)
GLUCOSE SERPL-MCNC: 133 MG/DL — HIGH (ref 70–99)
GLUCOSE SERPL-MCNC: 133 MG/DL — HIGH (ref 70–99)
GLUCOSE SERPL-MCNC: 135 MG/DL — HIGH (ref 70–99)
GLUCOSE SERPL-MCNC: 136 MG/DL — HIGH (ref 70–99)
GLUCOSE SERPL-MCNC: 143 MG/DL — HIGH (ref 70–99)
GLUCOSE SERPL-MCNC: 148 MG/DL — HIGH (ref 70–99)
GLUCOSE SERPL-MCNC: 151 MG/DL — HIGH (ref 70–99)
GLUCOSE SERPL-MCNC: 152 MG/DL — HIGH (ref 70–99)
GLUCOSE SERPL-MCNC: 153 MG/DL — HIGH (ref 70–99)
GLUCOSE SERPL-MCNC: 170 MG/DL — HIGH (ref 70–99)
GLUCOSE SERPL-MCNC: 170 MG/DL — HIGH (ref 70–99)
GLUCOSE SERPL-MCNC: 173 MG/DL — HIGH (ref 70–99)
GLUCOSE SERPL-MCNC: 173 MG/DL — HIGH (ref 70–99)
GLUCOSE SERPL-MCNC: 81 MG/DL — SIGNIFICANT CHANGE UP (ref 70–99)
GLUCOSE SERPL-MCNC: 82 MG/DL — SIGNIFICANT CHANGE UP (ref 70–99)
GLUCOSE SERPL-MCNC: 89 MG/DL — SIGNIFICANT CHANGE UP (ref 70–99)
GLUCOSE SERPL-MCNC: 93 MG/DL — SIGNIFICANT CHANGE UP (ref 70–99)
GLUCOSE SERPL-MCNC: 93 MG/DL — SIGNIFICANT CHANGE UP (ref 70–99)
GLUCOSE SERPL-MCNC: 98 MG/DL — SIGNIFICANT CHANGE UP (ref 70–99)
GLUCOSE UR QL: NEGATIVE — SIGNIFICANT CHANGE UP
GRAM STN FLD: SIGNIFICANT CHANGE UP
HAPTOGLOB SERPL-MCNC: 88 MG/DL — SIGNIFICANT CHANGE UP (ref 34–200)
HAV IGM SER-ACNC: SIGNIFICANT CHANGE UP
HBA1C BLD-MCNC: 6.5 % — HIGH (ref 4–5.6)
HBV CORE IGM SER-ACNC: SIGNIFICANT CHANGE UP
HBV SURFACE AG SER-ACNC: SIGNIFICANT CHANGE UP
HCO3 BLDA-SCNC: 23 MMOL/L — SIGNIFICANT CHANGE UP (ref 23–27)
HCO3 BLDA-SCNC: 26 MMOL/L — SIGNIFICANT CHANGE UP (ref 23–27)
HCO3 BLDA-SCNC: 28 MMOL/L — HIGH (ref 23–27)
HCO3 BLDA-SCNC: 28 MMOL/L — HIGH (ref 23–27)
HCO3 BLDA-SCNC: 29 MMOL/L — HIGH (ref 23–27)
HCO3 BLDA-SCNC: 30 MMOL/L — HIGH (ref 23–27)
HCO3 BLDA-SCNC: 32 MMOL/L — HIGH (ref 23–27)
HCO3 BLDA-SCNC: 32 MMOL/L — HIGH (ref 23–27)
HCO3 BLDV-SCNC: 29 MMOL/L — SIGNIFICANT CHANGE UP (ref 21–29)
HCT VFR BLD CALC: 35.9 % — LOW (ref 39–50)
HCT VFR BLD CALC: 36.3 % — LOW (ref 39–50)
HCT VFR BLD CALC: 36.3 % — LOW (ref 39–50)
HCT VFR BLD CALC: 36.5 % — LOW (ref 39–50)
HCT VFR BLD CALC: 38.4 % — LOW (ref 39–50)
HCT VFR BLD CALC: 39.1 % — SIGNIFICANT CHANGE UP (ref 39–50)
HCT VFR BLD CALC: 39.8 % — SIGNIFICANT CHANGE UP (ref 39–50)
HCT VFR BLD CALC: 40.1 % — SIGNIFICANT CHANGE UP (ref 39–50)
HCT VFR BLD CALC: 42.5 % — SIGNIFICANT CHANGE UP (ref 39–50)
HCT VFR BLD CALC: 43.4 % — SIGNIFICANT CHANGE UP (ref 39–50)
HCT VFR BLD CALC: 44.5 % — SIGNIFICANT CHANGE UP (ref 39–50)
HCT VFR BLD CALC: 44.8 % — SIGNIFICANT CHANGE UP (ref 39–50)
HCT VFR BLD CALC: 44.8 % — SIGNIFICANT CHANGE UP (ref 39–50)
HCT VFR BLD CALC: 46.6 % — SIGNIFICANT CHANGE UP (ref 39–50)
HCT VFR BLD CALC: 47.1 % — SIGNIFICANT CHANGE UP (ref 39–50)
HCT VFR BLD CALC: 47.1 % — SIGNIFICANT CHANGE UP (ref 39–50)
HCT VFR BLD CALC: 47.2 % — SIGNIFICANT CHANGE UP (ref 39–50)
HCT VFR BLD CALC: 47.3 % — SIGNIFICANT CHANGE UP (ref 39–50)
HCT VFR BLD CALC: 48.2 % — SIGNIFICANT CHANGE UP (ref 39–50)
HCT VFR BLD CALC: 48.9 % — SIGNIFICANT CHANGE UP (ref 39–50)
HCV AB S/CO SERPL IA: 0.07 S/CO — SIGNIFICANT CHANGE UP (ref 0–0.99)
HCV AB S/CO SERPL IA: 0.1 S/CO — SIGNIFICANT CHANGE UP (ref 0–0.99)
HCV AB SERPL-IMP: SIGNIFICANT CHANGE UP
HCV AB SERPL-IMP: SIGNIFICANT CHANGE UP
HDLC SERPL-MCNC: 60 MG/DL — SIGNIFICANT CHANGE UP
HEPARIN-PF4 AB RESULT: 0.7 U/ML — SIGNIFICANT CHANGE UP (ref 0–0.9)
HGB BLD-MCNC: 11.4 G/DL — LOW (ref 13–17)
HGB BLD-MCNC: 11.5 G/DL — LOW (ref 13–17)
HGB BLD-MCNC: 11.5 G/DL — LOW (ref 13–17)
HGB BLD-MCNC: 11.6 G/DL — LOW (ref 13–17)
HGB BLD-MCNC: 12.1 G/DL — LOW (ref 13–17)
HGB BLD-MCNC: 12.2 G/DL — LOW (ref 13–17)
HGB BLD-MCNC: 12.5 G/DL — LOW (ref 13–17)
HGB BLD-MCNC: 12.5 G/DL — LOW (ref 13–17)
HGB BLD-MCNC: 13.4 G/DL — SIGNIFICANT CHANGE UP (ref 13–17)
HGB BLD-MCNC: 14.1 G/DL — SIGNIFICANT CHANGE UP (ref 13–17)
HGB BLD-MCNC: 14.3 G/DL — SIGNIFICANT CHANGE UP (ref 13–17)
HGB BLD-MCNC: 14.7 G/DL — SIGNIFICANT CHANGE UP (ref 13–17)
HGB BLD-MCNC: 14.9 G/DL — SIGNIFICANT CHANGE UP (ref 13–17)
HGB BLD-MCNC: 15 G/DL — SIGNIFICANT CHANGE UP (ref 13–17)
HGB BLD-MCNC: 15.1 G/DL — SIGNIFICANT CHANGE UP (ref 13–17)
HGB BLD-MCNC: 15.3 G/DL — SIGNIFICANT CHANGE UP (ref 13–17)
HGB BLD-MCNC: 15.4 G/DL — SIGNIFICANT CHANGE UP (ref 13–17)
HGB BLD-MCNC: 15.7 G/DL — SIGNIFICANT CHANGE UP (ref 13–17)
HGB BLD-MCNC: 15.7 G/DL — SIGNIFICANT CHANGE UP (ref 13–17)
HGB BLD-MCNC: 16.2 G/DL — SIGNIFICANT CHANGE UP (ref 13–17)
HOROWITZ INDEX BLDA+IHG-RTO: 21 — SIGNIFICANT CHANGE UP
HOROWITZ INDEX BLDA+IHG-RTO: 40 — SIGNIFICANT CHANGE UP
HOROWITZ INDEX BLDA+IHG-RTO: 50 — SIGNIFICANT CHANGE UP
HOROWITZ INDEX BLDA+IHG-RTO: 60 — SIGNIFICANT CHANGE UP
HOROWITZ INDEX BLDA+IHG-RTO: 70 — SIGNIFICANT CHANGE UP
HOROWITZ INDEX BLDV+IHG-RTO: 100 — SIGNIFICANT CHANGE UP
HYPOCHROMIA BLD QL: SLIGHT — SIGNIFICANT CHANGE UP
IMM GRANULOCYTES NFR BLD AUTO: 7.2 % — HIGH (ref 0–1.5)
IMM GRANULOCYTES NFR BLD AUTO: SIGNIFICANT CHANGE UP % (ref 0–1.5)
INR BLD: 1.18 RATIO — HIGH (ref 0.88–1.16)
INR BLD: 1.28 RATIO — HIGH (ref 0.88–1.16)
INR BLD: 1.43 RATIO — HIGH (ref 0.88–1.16)
INTERPRETATION 24H UR IFE-IMP: SIGNIFICANT CHANGE UP
INTERPRETATION 24H UR IFE-IMP: SIGNIFICANT CHANGE UP
INTERPRETATION SERPL IFE-IMP: SIGNIFICANT CHANGE UP
KETONES UR-MCNC: ABNORMAL
KETONES UR-MCNC: ABNORMAL
KETONES UR-MCNC: NEGATIVE — SIGNIFICANT CHANGE UP
LACTATE SERPL-SCNC: 2.9 MMOL/L — HIGH (ref 0.7–2)
LACTATE SERPL-SCNC: 3.2 MMOL/L — HIGH (ref 0.7–2)
LACTATE SERPL-SCNC: 3.5 MMOL/L — HIGH (ref 0.7–2)
LACTATE SERPL-SCNC: 4.3 MMOL/L — CRITICAL HIGH (ref 0.7–2)
LACTATE SERPL-SCNC: 4.5 MMOL/L — CRITICAL HIGH (ref 0.7–2)
LACTATE SERPL-SCNC: 5.9 MMOL/L — CRITICAL HIGH (ref 0.7–2)
LDH SERPL L TO P-CCNC: 941 U/L — HIGH (ref 120–225)
LEUKOCYTE ESTERASE UR-ACNC: ABNORMAL
LIPID PNL WITH DIRECT LDL SERPL: 122 MG/DL — SIGNIFICANT CHANGE UP
LYMPHOCYTES # BLD AUTO: 0.39 K/UL — LOW (ref 1–3.3)
LYMPHOCYTES # BLD AUTO: 0.76 K/UL — LOW (ref 1–3.3)
LYMPHOCYTES # BLD AUTO: 1 K/UL — SIGNIFICANT CHANGE UP (ref 1–3.3)
LYMPHOCYTES # BLD AUTO: 1.07 K/UL — SIGNIFICANT CHANGE UP (ref 1–3.3)
LYMPHOCYTES # BLD AUTO: 1.27 K/UL — SIGNIFICANT CHANGE UP (ref 1–3.3)
LYMPHOCYTES # BLD AUTO: 1.72 K/UL — SIGNIFICANT CHANGE UP (ref 1–3.3)
LYMPHOCYTES # BLD AUTO: 10 % — LOW (ref 13–44)
LYMPHOCYTES # BLD AUTO: 18 % — SIGNIFICANT CHANGE UP (ref 13–44)
LYMPHOCYTES # BLD AUTO: 18.8 % — SIGNIFICANT CHANGE UP (ref 13–44)
LYMPHOCYTES # BLD AUTO: 32 % — SIGNIFICANT CHANGE UP (ref 13–44)
LYMPHOCYTES # BLD AUTO: 32 % — SIGNIFICANT CHANGE UP (ref 13–44)
LYMPHOCYTES # BLD AUTO: 35 % — SIGNIFICANT CHANGE UP (ref 13–44)
LYMPHOCYTES # BLD AUTO: SIGNIFICANT CHANGE UP % (ref 13–44)
LYMPHOCYTES # BLD AUTO: SIGNIFICANT CHANGE UP K/UL (ref 1–3.3)
M PROTEIN 24H UR ELPH-MRATE: SIGNIFICANT CHANGE UP
MACROCYTES BLD QL: SLIGHT — SIGNIFICANT CHANGE UP
MAGNESIUM SERPL-MCNC: 1.8 MG/DL — SIGNIFICANT CHANGE UP (ref 1.6–2.6)
MAGNESIUM SERPL-MCNC: 1.9 MG/DL — SIGNIFICANT CHANGE UP (ref 1.6–2.6)
MAGNESIUM SERPL-MCNC: 2 MG/DL — SIGNIFICANT CHANGE UP (ref 1.6–2.6)
MAGNESIUM SERPL-MCNC: 2.2 MG/DL — SIGNIFICANT CHANGE UP (ref 1.6–2.6)
MAGNESIUM SERPL-MCNC: 2.2 MG/DL — SIGNIFICANT CHANGE UP (ref 1.6–2.6)
MAGNESIUM SERPL-MCNC: 2.3 MG/DL — SIGNIFICANT CHANGE UP (ref 1.6–2.6)
MAGNESIUM SERPL-MCNC: 2.3 MG/DL — SIGNIFICANT CHANGE UP (ref 1.6–2.6)
MAGNESIUM SERPL-MCNC: 2.4 MG/DL — SIGNIFICANT CHANGE UP (ref 1.6–2.6)
MAGNESIUM SERPL-MCNC: 2.5 MG/DL — SIGNIFICANT CHANGE UP (ref 1.6–2.6)
MAGNESIUM SERPL-MCNC: 2.6 MG/DL — SIGNIFICANT CHANGE UP (ref 1.6–2.6)
MAGNESIUM SERPL-MCNC: 2.6 MG/DL — SIGNIFICANT CHANGE UP (ref 1.6–2.6)
MANUAL SMEAR VERIFICATION: SIGNIFICANT CHANGE UP
MCHC RBC-ENTMCNC: 30.9 GM/DL — LOW (ref 32–36)
MCHC RBC-ENTMCNC: 30.9 GM/DL — LOW (ref 32–36)
MCHC RBC-ENTMCNC: 31.2 GM/DL — LOW (ref 32–36)
MCHC RBC-ENTMCNC: 31.2 GM/DL — LOW (ref 32–36)
MCHC RBC-ENTMCNC: 31.4 GM/DL — LOW (ref 32–36)
MCHC RBC-ENTMCNC: 31.4 GM/DL — LOW (ref 32–36)
MCHC RBC-ENTMCNC: 31.5 GM/DL — LOW (ref 32–36)
MCHC RBC-ENTMCNC: 31.5 GM/DL — LOW (ref 32–36)
MCHC RBC-ENTMCNC: 31.6 GM/DL — LOW (ref 32–36)
MCHC RBC-ENTMCNC: 31.7 GM/DL — LOW (ref 32–36)
MCHC RBC-ENTMCNC: 31.7 GM/DL — LOW (ref 32–36)
MCHC RBC-ENTMCNC: 31.8 GM/DL — LOW (ref 32–36)
MCHC RBC-ENTMCNC: 31.8 GM/DL — LOW (ref 32–36)
MCHC RBC-ENTMCNC: 31.9 GM/DL — LOW (ref 32–36)
MCHC RBC-ENTMCNC: 31.9 PG — SIGNIFICANT CHANGE UP (ref 27–34)
MCHC RBC-ENTMCNC: 32 GM/DL — SIGNIFICANT CHANGE UP (ref 32–36)
MCHC RBC-ENTMCNC: 32 PG — SIGNIFICANT CHANGE UP (ref 27–34)
MCHC RBC-ENTMCNC: 32 PG — SIGNIFICANT CHANGE UP (ref 27–34)
MCHC RBC-ENTMCNC: 32.1 PG — SIGNIFICANT CHANGE UP (ref 27–34)
MCHC RBC-ENTMCNC: 32.1 PG — SIGNIFICANT CHANGE UP (ref 27–34)
MCHC RBC-ENTMCNC: 32.3 PG — SIGNIFICANT CHANGE UP (ref 27–34)
MCHC RBC-ENTMCNC: 32.4 PG — SIGNIFICANT CHANGE UP (ref 27–34)
MCHC RBC-ENTMCNC: 32.5 PG — SIGNIFICANT CHANGE UP (ref 27–34)
MCHC RBC-ENTMCNC: 32.6 PG — SIGNIFICANT CHANGE UP (ref 27–34)
MCHC RBC-ENTMCNC: 32.7 PG — SIGNIFICANT CHANGE UP (ref 27–34)
MCHC RBC-ENTMCNC: 32.7 PG — SIGNIFICANT CHANGE UP (ref 27–34)
MCHC RBC-ENTMCNC: 32.8 GM/DL — SIGNIFICANT CHANGE UP (ref 32–36)
MCHC RBC-ENTMCNC: 32.8 GM/DL — SIGNIFICANT CHANGE UP (ref 32–36)
MCHC RBC-ENTMCNC: 32.9 PG — SIGNIFICANT CHANGE UP (ref 27–34)
MCHC RBC-ENTMCNC: 33.3 GM/DL — SIGNIFICANT CHANGE UP (ref 32–36)
MCHC RBC-ENTMCNC: 33.5 PG — SIGNIFICANT CHANGE UP (ref 27–34)
MCHC RBC-ENTMCNC: 33.9 GM/DL — SIGNIFICANT CHANGE UP (ref 32–36)
MCHC RBC-ENTMCNC: 34.2 GM/DL — SIGNIFICANT CHANGE UP (ref 32–36)
MCHC RBC-ENTMCNC: 35.1 GM/DL — SIGNIFICANT CHANGE UP (ref 32–36)
MCHC RBC-ENTMCNC: 35.3 GM/DL — SIGNIFICANT CHANGE UP (ref 32–36)
MCV RBC AUTO: 100.2 FL — HIGH (ref 80–100)
MCV RBC AUTO: 100.8 FL — HIGH (ref 80–100)
MCV RBC AUTO: 101.5 FL — HIGH (ref 80–100)
MCV RBC AUTO: 101.7 FL — HIGH (ref 80–100)
MCV RBC AUTO: 102.2 FL — HIGH (ref 80–100)
MCV RBC AUTO: 102.3 FL — HIGH (ref 80–100)
MCV RBC AUTO: 102.4 FL — HIGH (ref 80–100)
MCV RBC AUTO: 102.4 FL — HIGH (ref 80–100)
MCV RBC AUTO: 102.8 FL — HIGH (ref 80–100)
MCV RBC AUTO: 102.8 FL — HIGH (ref 80–100)
MCV RBC AUTO: 103 FL — HIGH (ref 80–100)
MCV RBC AUTO: 103 FL — HIGH (ref 80–100)
MCV RBC AUTO: 103.4 FL — HIGH (ref 80–100)
MCV RBC AUTO: 103.4 FL — HIGH (ref 80–100)
MCV RBC AUTO: 103.8 FL — HIGH (ref 80–100)
MCV RBC AUTO: 103.8 FL — HIGH (ref 80–100)
MCV RBC AUTO: 92.2 FL — SIGNIFICANT CHANGE UP (ref 80–100)
MCV RBC AUTO: 93.3 FL — SIGNIFICANT CHANGE UP (ref 80–100)
MCV RBC AUTO: 95.9 FL — SIGNIFICANT CHANGE UP (ref 80–100)
MCV RBC AUTO: 98.1 FL — SIGNIFICANT CHANGE UP (ref 80–100)
MCV RBC AUTO: 98.7 FL — SIGNIFICANT CHANGE UP (ref 80–100)
MCV RBC AUTO: 99.6 FL — SIGNIFICANT CHANGE UP (ref 80–100)
METAMYELOCYTES # FLD: 3 % — HIGH (ref 0–0)
METHOD TYPE: SIGNIFICANT CHANGE UP
MICROCYTES BLD QL: SLIGHT — SIGNIFICANT CHANGE UP
MONOCYTES # BLD AUTO: 0.08 K/UL — SIGNIFICANT CHANGE UP (ref 0–0.9)
MONOCYTES # BLD AUTO: 0.09 K/UL — SIGNIFICANT CHANGE UP (ref 0–0.9)
MONOCYTES # BLD AUTO: 0.29 K/UL — SIGNIFICANT CHANGE UP (ref 0–0.9)
MONOCYTES # BLD AUTO: 0.32 K/UL — SIGNIFICANT CHANGE UP (ref 0–0.9)
MONOCYTES # BLD AUTO: 0.34 K/UL — SIGNIFICANT CHANGE UP (ref 0–0.9)
MONOCYTES # BLD AUTO: 0.39 K/UL — SIGNIFICANT CHANGE UP (ref 0–0.9)
MONOCYTES # BLD AUTO: SIGNIFICANT CHANGE UP K/UL (ref 0–0.9)
MONOCYTES NFR BLD AUTO: 2 % — SIGNIFICANT CHANGE UP (ref 2–14)
MONOCYTES NFR BLD AUTO: 4 % — SIGNIFICANT CHANGE UP (ref 2–14)
MONOCYTES NFR BLD AUTO: 6 % — SIGNIFICANT CHANGE UP (ref 2–14)
MONOCYTES NFR BLD AUTO: 6 % — SIGNIFICANT CHANGE UP (ref 2–14)
MONOCYTES NFR BLD AUTO: 7 % — SIGNIFICANT CHANGE UP (ref 2–14)
MONOCYTES NFR BLD AUTO: 8 % — SIGNIFICANT CHANGE UP (ref 2–14)
MONOCYTES NFR BLD AUTO: SIGNIFICANT CHANGE UP % (ref 2–14)
MRSA PCR RESULT.: SIGNIFICANT CHANGE UP
MYELOCYTES NFR BLD: 2 % — HIGH (ref 0–0)
NEUTROPHILS # BLD AUTO: 1.52 K/UL — LOW (ref 1.8–7.4)
NEUTROPHILS # BLD AUTO: 2.02 K/UL — SIGNIFICANT CHANGE UP (ref 1.8–7.4)
NEUTROPHILS # BLD AUTO: 2.5 K/UL — SIGNIFICANT CHANGE UP (ref 1.8–7.4)
NEUTROPHILS # BLD AUTO: 3.29 K/UL — SIGNIFICANT CHANGE UP (ref 1.8–7.4)
NEUTROPHILS # BLD AUTO: 3.81 K/UL — SIGNIFICANT CHANGE UP (ref 1.8–7.4)
NEUTROPHILS # BLD AUTO: 3.96 K/UL — SIGNIFICANT CHANGE UP (ref 1.8–7.4)
NEUTROPHILS # BLD AUTO: SIGNIFICANT CHANGE UP K/UL (ref 1.8–7.4)
NEUTROPHILS NFR BLD AUTO: 40 % — LOW (ref 43–77)
NEUTROPHILS NFR BLD AUTO: 48 % — SIGNIFICANT CHANGE UP (ref 43–77)
NEUTROPHILS NFR BLD AUTO: 55 % — SIGNIFICANT CHANGE UP (ref 43–77)
NEUTROPHILS NFR BLD AUTO: 67.1 % — SIGNIFICANT CHANGE UP (ref 43–77)
NEUTROPHILS NFR BLD AUTO: 70 % — SIGNIFICANT CHANGE UP (ref 43–77)
NEUTROPHILS NFR BLD AUTO: 82 % — HIGH (ref 43–77)
NEUTROPHILS NFR BLD AUTO: SIGNIFICANT CHANGE UP % (ref 43–77)
NEUTS BAND # BLD: 1 % — SIGNIFICANT CHANGE UP (ref 0–8)
NEUTS BAND # BLD: 3 % — SIGNIFICANT CHANGE UP (ref 0–8)
NITRITE UR-MCNC: NEGATIVE — SIGNIFICANT CHANGE UP
NITRITE UR-MCNC: POSITIVE
NITRITE UR-MCNC: POSITIVE
NRBC # BLD: 0 /100 — SIGNIFICANT CHANGE UP (ref 0–0)
NRBC # BLD: 10 /100 WBCS — HIGH (ref 0–0)
NRBC # BLD: 2 /100 WBCS — HIGH (ref 0–0)
NRBC # BLD: 3 /100 — HIGH (ref 0–0)
NRBC # BLD: 4 /100 WBCS — HIGH (ref 0–0)
NRBC # BLD: 5 /100 WBCS — HIGH (ref 0–0)
NRBC # BLD: 6 /100 WBCS — HIGH (ref 0–0)
NRBC # BLD: 7 /100 WBCS — HIGH (ref 0–0)
NRBC # BLD: 8 /100 WBCS — HIGH (ref 0–0)
NRBC # BLD: 9 /100 WBCS — HIGH (ref 0–0)
NRBC # BLD: 9 /100 WBCS — HIGH (ref 0–0)
NRBC # BLD: SIGNIFICANT CHANGE UP /100 WBCS (ref 0–0)
NT-PROBNP SERPL-SCNC: 1006 PG/ML — HIGH (ref 0–125)
OSMOLALITY SERPL: 271 MOSMOL/KG — LOW (ref 280–301)
OSMOLALITY UR: 742 MOS/KG — SIGNIFICANT CHANGE UP (ref 50–1200)
OVALOCYTES BLD QL SMEAR: SLIGHT — SIGNIFICANT CHANGE UP
PCO2 BLDA: 47 MMHG — HIGH (ref 32–46)
PCO2 BLDA: 48 MMHG — HIGH (ref 32–46)
PCO2 BLDA: 50 MMHG — HIGH (ref 32–46)
PCO2 BLDA: 51 MMHG — HIGH (ref 32–46)
PCO2 BLDA: 52 MMHG — HIGH (ref 32–46)
PCO2 BLDA: 52 MMHG — HIGH (ref 32–46)
PCO2 BLDA: 54 MMHG — HIGH (ref 32–46)
PCO2 BLDA: 54 MMHG — HIGH (ref 32–46)
PCO2 BLDA: 55 MMHG — HIGH (ref 32–46)
PCO2 BLDA: 55 MMHG — HIGH (ref 32–46)
PCO2 BLDV: 58 MMHG — HIGH (ref 35–50)
PF4 HEPARIN CMPLX AB SER-ACNC: NEGATIVE — SIGNIFICANT CHANGE UP
PH BLDA: 7.3 — LOW (ref 7.35–7.45)
PH BLDA: 7.33 — LOW (ref 7.35–7.45)
PH BLDA: 7.35 — SIGNIFICANT CHANGE UP (ref 7.35–7.45)
PH BLDA: 7.35 — SIGNIFICANT CHANGE UP (ref 7.35–7.45)
PH BLDA: 7.36 — SIGNIFICANT CHANGE UP (ref 7.35–7.45)
PH BLDA: 7.36 — SIGNIFICANT CHANGE UP (ref 7.35–7.45)
PH BLDA: 7.38 — SIGNIFICANT CHANGE UP (ref 7.35–7.45)
PH BLDA: 7.38 — SIGNIFICANT CHANGE UP (ref 7.35–7.45)
PH BLDA: 7.39 — SIGNIFICANT CHANGE UP (ref 7.35–7.45)
PH BLDA: 7.4 — SIGNIFICANT CHANGE UP (ref 7.35–7.45)
PH BLDV: 7.32 — LOW (ref 7.35–7.45)
PH UR: 5 — SIGNIFICANT CHANGE UP (ref 5–8)
PHOSPHATE SERPL-MCNC: 2 MG/DL — LOW (ref 2.5–4.5)
PHOSPHATE SERPL-MCNC: 2.1 MG/DL — LOW (ref 2.5–4.5)
PHOSPHATE SERPL-MCNC: 2.1 MG/DL — LOW (ref 2.5–4.5)
PHOSPHATE SERPL-MCNC: 2.7 MG/DL — SIGNIFICANT CHANGE UP (ref 2.5–4.5)
PHOSPHATE SERPL-MCNC: 2.8 MG/DL — SIGNIFICANT CHANGE UP (ref 2.5–4.5)
PHOSPHATE SERPL-MCNC: 3 MG/DL — SIGNIFICANT CHANGE UP (ref 2.5–4.5)
PHOSPHATE SERPL-MCNC: 3.4 MG/DL — SIGNIFICANT CHANGE UP (ref 2.5–4.5)
PHOSPHATE SERPL-MCNC: 3.8 MG/DL — SIGNIFICANT CHANGE UP (ref 2.5–4.5)
PHOSPHATE SERPL-MCNC: 3.8 MG/DL — SIGNIFICANT CHANGE UP (ref 2.5–4.5)
PHOSPHATE SERPL-MCNC: 3.9 MG/DL — SIGNIFICANT CHANGE UP (ref 2.5–4.5)
PHOSPHATE SERPL-MCNC: 4.1 MG/DL — SIGNIFICANT CHANGE UP (ref 2.5–4.5)
PHOSPHATE SERPL-MCNC: 5.2 MG/DL — HIGH (ref 2.5–4.5)
PLAT MORPH BLD: NORMAL — SIGNIFICANT CHANGE UP
PLATELET # BLD AUTO: 25 K/UL — LOW (ref 150–400)
PLATELET # BLD AUTO: 28 K/UL — LOW (ref 150–400)
PLATELET # BLD AUTO: 31 K/UL — LOW (ref 150–400)
PLATELET # BLD AUTO: 31 K/UL — LOW (ref 150–400)
PLATELET # BLD AUTO: 32 K/UL — LOW (ref 150–400)
PLATELET # BLD AUTO: 33 K/UL — LOW (ref 150–400)
PLATELET # BLD AUTO: 33 K/UL — LOW (ref 150–400)
PLATELET # BLD AUTO: 35 K/UL — LOW (ref 150–400)
PLATELET # BLD AUTO: 37 K/UL — LOW (ref 150–400)
PLATELET # BLD AUTO: 38 K/UL — LOW (ref 150–400)
PLATELET # BLD AUTO: 39 K/UL — LOW (ref 150–400)
PLATELET # BLD AUTO: 40 K/UL — LOW (ref 150–400)
PLATELET # BLD AUTO: 44 K/UL — LOW (ref 150–400)
PLATELET # BLD AUTO: 44 K/UL — LOW (ref 150–400)
PLATELET # BLD AUTO: 46 K/UL — LOW (ref 150–400)
PLATELET # BLD AUTO: 48 K/UL — LOW (ref 150–400)
PLATELET # BLD AUTO: 52 K/UL — LOW (ref 150–400)
PLATELET # BLD AUTO: 55 K/UL — LOW (ref 150–400)
PLATELET # BLD AUTO: 59 K/UL — LOW (ref 150–400)
PLATELET # BLD AUTO: 62 K/UL — LOW (ref 150–400)
PLATELET COUNT - ESTIMATE: ABNORMAL
PO2 BLDA: 239 MMHG — HIGH (ref 74–108)
PO2 BLDA: 245 MMHG — HIGH (ref 74–108)
PO2 BLDA: 51 MMHG — LOW (ref 74–108)
PO2 BLDA: 53 MMHG — LOW (ref 74–108)
PO2 BLDA: 58 MMHG — LOW (ref 74–108)
PO2 BLDA: 59 MMHG — LOW (ref 74–108)
PO2 BLDA: 75 MMHG — SIGNIFICANT CHANGE UP (ref 74–108)
PO2 BLDA: 90 MMHG — SIGNIFICANT CHANGE UP (ref 74–108)
PO2 BLDA: 95 MMHG — SIGNIFICANT CHANGE UP (ref 74–108)
PO2 BLDA: 95 MMHG — SIGNIFICANT CHANGE UP (ref 74–108)
PO2 BLDV: 53 MMHG — HIGH (ref 25–45)
POIKILOCYTOSIS BLD QL AUTO: SLIGHT — SIGNIFICANT CHANGE UP
POIKILOCYTOSIS BLD QL AUTO: SLIGHT — SIGNIFICANT CHANGE UP
POLYCHROMASIA BLD QL SMEAR: SLIGHT — SIGNIFICANT CHANGE UP
POTASSIUM SERPL-MCNC: 3.7 MMOL/L — SIGNIFICANT CHANGE UP (ref 3.5–5.3)
POTASSIUM SERPL-MCNC: 3.8 MMOL/L — SIGNIFICANT CHANGE UP (ref 3.5–5.3)
POTASSIUM SERPL-MCNC: 3.9 MMOL/L — SIGNIFICANT CHANGE UP (ref 3.5–5.3)
POTASSIUM SERPL-MCNC: 4 MMOL/L — SIGNIFICANT CHANGE UP (ref 3.5–5.3)
POTASSIUM SERPL-MCNC: 4.1 MMOL/L — SIGNIFICANT CHANGE UP (ref 3.5–5.3)
POTASSIUM SERPL-MCNC: 4.3 MMOL/L — SIGNIFICANT CHANGE UP (ref 3.5–5.3)
POTASSIUM SERPL-MCNC: 4.4 MMOL/L — SIGNIFICANT CHANGE UP (ref 3.5–5.3)
POTASSIUM SERPL-MCNC: 4.4 MMOL/L — SIGNIFICANT CHANGE UP (ref 3.5–5.3)
POTASSIUM SERPL-MCNC: 4.6 MMOL/L — SIGNIFICANT CHANGE UP (ref 3.5–5.3)
POTASSIUM SERPL-MCNC: 4.8 MMOL/L — SIGNIFICANT CHANGE UP (ref 3.5–5.3)
POTASSIUM SERPL-MCNC: 5.2 MMOL/L — SIGNIFICANT CHANGE UP (ref 3.5–5.3)
POTASSIUM SERPL-MCNC: 5.3 MMOL/L — SIGNIFICANT CHANGE UP (ref 3.5–5.3)
POTASSIUM SERPL-MCNC: 5.4 MMOL/L — HIGH (ref 3.5–5.3)
POTASSIUM SERPL-MCNC: 5.6 MMOL/L — HIGH (ref 3.5–5.3)
POTASSIUM SERPL-MCNC: 5.6 MMOL/L — HIGH (ref 3.5–5.3)
POTASSIUM SERPL-MCNC: 5.7 MMOL/L — HIGH (ref 3.5–5.3)
POTASSIUM SERPL-MCNC: 5.8 MMOL/L — HIGH (ref 3.5–5.3)
POTASSIUM SERPL-MCNC: 5.9 MMOL/L — HIGH (ref 3.5–5.3)
POTASSIUM SERPL-MCNC: 6 MMOL/L — HIGH (ref 3.5–5.3)
POTASSIUM SERPL-MCNC: 6.4 MMOL/L — CRITICAL HIGH (ref 3.5–5.3)
POTASSIUM SERPL-SCNC: 3.7 MMOL/L — SIGNIFICANT CHANGE UP (ref 3.5–5.3)
POTASSIUM SERPL-SCNC: 3.8 MMOL/L — SIGNIFICANT CHANGE UP (ref 3.5–5.3)
POTASSIUM SERPL-SCNC: 3.9 MMOL/L — SIGNIFICANT CHANGE UP (ref 3.5–5.3)
POTASSIUM SERPL-SCNC: 4 MMOL/L — SIGNIFICANT CHANGE UP (ref 3.5–5.3)
POTASSIUM SERPL-SCNC: 4.1 MMOL/L — SIGNIFICANT CHANGE UP (ref 3.5–5.3)
POTASSIUM SERPL-SCNC: 4.3 MMOL/L — SIGNIFICANT CHANGE UP (ref 3.5–5.3)
POTASSIUM SERPL-SCNC: 4.4 MMOL/L — SIGNIFICANT CHANGE UP (ref 3.5–5.3)
POTASSIUM SERPL-SCNC: 4.4 MMOL/L — SIGNIFICANT CHANGE UP (ref 3.5–5.3)
POTASSIUM SERPL-SCNC: 4.6 MMOL/L — SIGNIFICANT CHANGE UP (ref 3.5–5.3)
POTASSIUM SERPL-SCNC: 4.8 MMOL/L — SIGNIFICANT CHANGE UP (ref 3.5–5.3)
POTASSIUM SERPL-SCNC: 5.2 MMOL/L — SIGNIFICANT CHANGE UP (ref 3.5–5.3)
POTASSIUM SERPL-SCNC: 5.3 MMOL/L — SIGNIFICANT CHANGE UP (ref 3.5–5.3)
POTASSIUM SERPL-SCNC: 5.4 MMOL/L — HIGH (ref 3.5–5.3)
POTASSIUM SERPL-SCNC: 5.6 MMOL/L — HIGH (ref 3.5–5.3)
POTASSIUM SERPL-SCNC: 5.6 MMOL/L — HIGH (ref 3.5–5.3)
POTASSIUM SERPL-SCNC: 5.7 MMOL/L — HIGH (ref 3.5–5.3)
POTASSIUM SERPL-SCNC: 5.8 MMOL/L — HIGH (ref 3.5–5.3)
POTASSIUM SERPL-SCNC: 5.9 MMOL/L — HIGH (ref 3.5–5.3)
POTASSIUM SERPL-SCNC: 6 MMOL/L — HIGH (ref 3.5–5.3)
POTASSIUM SERPL-SCNC: 6.4 MMOL/L — CRITICAL HIGH (ref 3.5–5.3)
PROT ?TM UR-MCNC: 51 MG/DL — HIGH (ref 0–12)
PROT ?TM UR-MCNC: 51 MG/DL — HIGH (ref 0–12)
PROT PATTERN 24H UR ELPH-IMP: SIGNIFICANT CHANGE UP
PROT PATTERN SERPL ELPH-IMP: SIGNIFICANT CHANGE UP
PROT SERPL-MCNC: 5.1 G/DL — LOW (ref 6–8.3)
PROT SERPL-MCNC: 5.1 G/DL — LOW (ref 6–8.3)
PROT SERPL-MCNC: 5.4 G/DL — LOW (ref 6–8.3)
PROT SERPL-MCNC: 5.7 G/DL — LOW (ref 6–8.3)
PROT SERPL-MCNC: 5.7 G/DL — LOW (ref 6–8.3)
PROT SERPL-MCNC: 5.8 G/DL — LOW (ref 6–8.3)
PROT SERPL-MCNC: 5.9 G/DL — LOW (ref 6–8.3)
PROT SERPL-MCNC: 6 G/DL — SIGNIFICANT CHANGE UP (ref 6–8.3)
PROT SERPL-MCNC: 6.3 G/DL — SIGNIFICANT CHANGE UP (ref 6–8.3)
PROT SERPL-MCNC: 6.5 G/DL — SIGNIFICANT CHANGE UP (ref 6–8.3)
PROT SERPL-MCNC: 6.8 G/DL — SIGNIFICANT CHANGE UP (ref 6–8.3)
PROT SERPL-MCNC: 6.8 G/DL — SIGNIFICANT CHANGE UP (ref 6–8.3)
PROT UR-MCNC: 100
PROTHROM AB SERPL-ACNC: 13.2 SEC — HIGH (ref 10–12.9)
PROTHROM AB SERPL-ACNC: 14.3 SEC — HIGH (ref 10–12.9)
PROTHROM AB SERPL-ACNC: 16 SEC — HIGH (ref 10–12.9)
PTH-INTACT FLD-MCNC: 14 PG/ML — LOW (ref 15–65)
RBC # BLD: 3.53 M/UL — LOW (ref 4.2–5.8)
RBC # BLD: 3.55 M/UL — LOW (ref 4.2–5.8)
RBC # BLD: 3.56 M/UL — LOW (ref 4.2–5.8)
RBC # BLD: 3.59 M/UL — LOW (ref 4.2–5.8)
RBC # BLD: 3.7 M/UL — LOW (ref 4.2–5.8)
RBC # BLD: 3.82 M/UL — LOW (ref 4.2–5.8)
RBC # BLD: 3.85 M/UL — LOW (ref 4.2–5.8)
RBC # BLD: 3.9 M/UL — LOW (ref 4.2–5.8)
RBC # BLD: 4.18 M/UL — LOW (ref 4.2–5.8)
RBC # BLD: 4.32 M/UL — SIGNIFICANT CHANGE UP (ref 4.2–5.8)
RBC # BLD: 4.35 M/UL — SIGNIFICANT CHANGE UP (ref 4.2–5.8)
RBC # BLD: 4.5 M/UL — SIGNIFICANT CHANGE UP (ref 4.2–5.8)
RBC # BLD: 4.51 M/UL — SIGNIFICANT CHANGE UP (ref 4.2–5.8)
RBC # BLD: 4.6 M/UL — SIGNIFICANT CHANGE UP (ref 4.2–5.8)
RBC # BLD: 4.61 M/UL — SIGNIFICANT CHANGE UP (ref 4.2–5.8)
RBC # BLD: 4.61 M/UL — SIGNIFICANT CHANGE UP (ref 4.2–5.8)
RBC # BLD: 4.65 M/UL — SIGNIFICANT CHANGE UP (ref 4.2–5.8)
RBC # BLD: 4.66 M/UL — SIGNIFICANT CHANGE UP (ref 4.2–5.8)
RBC # BLD: 4.77 M/UL — SIGNIFICANT CHANGE UP (ref 4.2–5.8)
RBC # BLD: 4.81 M/UL — SIGNIFICANT CHANGE UP (ref 4.2–5.8)
RBC # BLD: 4.82 M/UL — SIGNIFICANT CHANGE UP (ref 4.2–5.8)
RBC # BLD: 4.93 M/UL — SIGNIFICANT CHANGE UP (ref 4.2–5.8)
RBC # FLD: 13.2 % — SIGNIFICANT CHANGE UP (ref 10.3–14.5)
RBC # FLD: 13.2 % — SIGNIFICANT CHANGE UP (ref 10.3–14.5)
RBC # FLD: 13.7 % — SIGNIFICANT CHANGE UP (ref 10.3–14.5)
RBC # FLD: 13.8 % — SIGNIFICANT CHANGE UP (ref 10.3–14.5)
RBC # FLD: 13.9 % — SIGNIFICANT CHANGE UP (ref 10.3–14.5)
RBC # FLD: 14 % — SIGNIFICANT CHANGE UP (ref 10.3–14.5)
RBC # FLD: 14 % — SIGNIFICANT CHANGE UP (ref 10.3–14.5)
RBC # FLD: 14.1 % — SIGNIFICANT CHANGE UP (ref 10.3–14.5)
RBC # FLD: 14.2 % — SIGNIFICANT CHANGE UP (ref 10.3–14.5)
RBC # FLD: 14.3 % — SIGNIFICANT CHANGE UP (ref 10.3–14.5)
RBC # FLD: 14.3 % — SIGNIFICANT CHANGE UP (ref 10.3–14.5)
RBC # FLD: 14.4 % — SIGNIFICANT CHANGE UP (ref 10.3–14.5)
RBC # FLD: 14.4 % — SIGNIFICANT CHANGE UP (ref 10.3–14.5)
RBC # FLD: 14.5 % — SIGNIFICANT CHANGE UP (ref 10.3–14.5)
RBC # FLD: 14.5 % — SIGNIFICANT CHANGE UP (ref 10.3–14.5)
RBC # FLD: 14.6 % — HIGH (ref 10.3–14.5)
RBC # FLD: 14.6 % — HIGH (ref 10.3–14.5)
RBC BLD AUTO: ABNORMAL
RBC BLD AUTO: ABNORMAL
RBC BLD AUTO: NORMAL — SIGNIFICANT CHANGE UP
RBC CASTS # UR COMP ASSIST: SIGNIFICANT CHANGE UP /HPF (ref 0–2)
RBC CASTS # UR COMP ASSIST: SIGNIFICANT CHANGE UP /HPF (ref 0–2)
RSV RESULT: SIGNIFICANT CHANGE UP
RSV RESULT: SIGNIFICANT CHANGE UP
RSV RNA RESP QL NAA+PROBE: SIGNIFICANT CHANGE UP
RSV RNA RESP QL NAA+PROBE: SIGNIFICANT CHANGE UP
S AUREUS DNA NOSE QL NAA+PROBE: SIGNIFICANT CHANGE UP
SAO2 % BLDA: 82 % — LOW (ref 92–96)
SAO2 % BLDA: 84 % — LOW (ref 92–96)
SAO2 % BLDA: 87 % — LOW (ref 92–96)
SAO2 % BLDA: 88 % — LOW (ref 92–96)
SAO2 % BLDA: 93 % — SIGNIFICANT CHANGE UP (ref 92–96)
SAO2 % BLDA: 96 % — SIGNIFICANT CHANGE UP (ref 92–96)
SAO2 % BLDA: 98 % — HIGH (ref 92–96)
SAO2 % BLDA: 99 % — HIGH (ref 92–96)
SAO2 % BLDV: 82 % — SIGNIFICANT CHANGE UP (ref 67–88)
SODIUM SERPL-SCNC: 125 MMOL/L — LOW (ref 135–145)
SODIUM SERPL-SCNC: 126 MMOL/L — LOW (ref 135–145)
SODIUM SERPL-SCNC: 127 MMOL/L — LOW (ref 135–145)
SODIUM SERPL-SCNC: 131 MMOL/L — LOW (ref 135–145)
SODIUM SERPL-SCNC: 132 MMOL/L — LOW (ref 135–145)
SODIUM SERPL-SCNC: 137 MMOL/L — SIGNIFICANT CHANGE UP (ref 135–145)
SODIUM SERPL-SCNC: 139 MMOL/L — SIGNIFICANT CHANGE UP (ref 135–145)
SODIUM SERPL-SCNC: 142 MMOL/L — SIGNIFICANT CHANGE UP (ref 135–145)
SODIUM SERPL-SCNC: 143 MMOL/L — SIGNIFICANT CHANGE UP (ref 135–145)
SODIUM SERPL-SCNC: 144 MMOL/L — SIGNIFICANT CHANGE UP (ref 135–145)
SODIUM SERPL-SCNC: 145 MMOL/L — SIGNIFICANT CHANGE UP (ref 135–145)
SODIUM SERPL-SCNC: 146 MMOL/L — HIGH (ref 135–145)
SODIUM SERPL-SCNC: 147 MMOL/L — HIGH (ref 135–145)
SODIUM SERPL-SCNC: 147 MMOL/L — HIGH (ref 135–145)
SODIUM SERPL-SCNC: 148 MMOL/L — HIGH (ref 135–145)
SODIUM SERPL-SCNC: 148 MMOL/L — HIGH (ref 135–145)
SODIUM SERPL-SCNC: 150 MMOL/L — HIGH (ref 135–145)
SODIUM SERPL-SCNC: 151 MMOL/L — HIGH (ref 135–145)
SODIUM SERPL-SCNC: 152 MMOL/L — HIGH (ref 135–145)
SODIUM SERPL-SCNC: 153 MMOL/L — HIGH (ref 135–145)
SODIUM UR-SCNC: 46 MMOL/L — SIGNIFICANT CHANGE UP (ref 40–220)
SP GR SPEC: 1.01 — SIGNIFICANT CHANGE UP (ref 1.01–1.02)
SP GR SPEC: 1.02 — SIGNIFICANT CHANGE UP (ref 1.01–1.02)
SP GR SPEC: 1.02 — SIGNIFICANT CHANGE UP (ref 1.01–1.02)
SPECIMEN SOURCE: SIGNIFICANT CHANGE UP
SPHEROCYTES BLD QL SMEAR: SLIGHT — SIGNIFICANT CHANGE UP
SRA INTERP SER-IMP: SIGNIFICANT CHANGE UP
TOTAL CHOLESTEROL/HDL RATIO MEASUREMENT: 3.5 RATIO — SIGNIFICANT CHANGE UP (ref 3.4–9.6)
TOTAL VOLUME - 24 HOUR: SIGNIFICANT CHANGE UP ML
TOXIC GRANULES BLD QL SMEAR: PRESENT — SIGNIFICANT CHANGE UP
TRIGL SERPL-MCNC: 131 MG/DL — SIGNIFICANT CHANGE UP (ref 10–149)
TROPONIN I SERPL-MCNC: 0.08 NG/ML — HIGH (ref 0–0.04)
TROPONIN I SERPL-MCNC: 0.09 NG/ML — HIGH (ref 0–0.04)
TROPONIN I SERPL-MCNC: 0.1 NG/ML — HIGH (ref 0–0.04)
TROPONIN I SERPL-MCNC: 0.11 NG/ML — HIGH (ref 0–0.04)
TROPONIN I SERPL-MCNC: 0.12 NG/ML — HIGH (ref 0–0.04)
TSH SERPL-MCNC: 1.25 UU/ML — SIGNIFICANT CHANGE UP (ref 0.34–4.82)
TSH SERPL-MCNC: 1.34 UU/ML — SIGNIFICANT CHANGE UP (ref 0.34–4.82)
URINE CREATININE CALCULATION: SIGNIFICANT CHANGE UP G/24 H (ref 1–2)
UROBILINOGEN FLD QL: 4
UROBILINOGEN FLD QL: 4
UROBILINOGEN FLD QL: 8
VANCOMYCIN FLD-MCNC: 24.8 UG/ML — SIGNIFICANT CHANGE UP
VANCOMYCIN TROUGH SERPL-MCNC: 16.2 UG/ML — SIGNIFICANT CHANGE UP (ref 10–20)
VANCOMYCIN TROUGH SERPL-MCNC: 30.1 UG/ML — CRITICAL HIGH (ref 10–20)
VARIANT LYMPHS # BLD: 3 % — SIGNIFICANT CHANGE UP (ref 0–6)
VARIANT LYMPHS # BLD: 4 % — SIGNIFICANT CHANGE UP (ref 0–6)
VIT B12 SERPL-MCNC: 1397 PG/ML — HIGH (ref 232–1245)
VIT D25+D1,25 OH+D1,25 PNL SERPL-MCNC: 19 PG/ML — LOW (ref 19.9–79.3)
WBC # BLD: 2.37 K/UL — LOW (ref 3.8–10.5)
WBC # BLD: 2.47 K/UL — LOW (ref 3.8–10.5)
WBC # BLD: 2.69 K/UL — LOW (ref 3.8–10.5)
WBC # BLD: 3.15 K/UL — LOW (ref 3.8–10.5)
WBC # BLD: 3.5 K/UL — LOW (ref 3.8–10.5)
WBC # BLD: 3.59 K/UL — LOW (ref 3.8–10.5)
WBC # BLD: 3.67 K/UL — LOW (ref 3.8–10.5)
WBC # BLD: 3.87 K/UL — SIGNIFICANT CHANGE UP (ref 3.8–10.5)
WBC # BLD: 3.97 K/UL — SIGNIFICANT CHANGE UP (ref 3.8–10.5)
WBC # BLD: 4.01 K/UL — SIGNIFICANT CHANGE UP (ref 3.8–10.5)
WBC # BLD: 4.04 K/UL — SIGNIFICANT CHANGE UP (ref 3.8–10.5)
WBC # BLD: 4.18 K/UL — SIGNIFICANT CHANGE UP (ref 3.8–10.5)
WBC # BLD: 4.28 K/UL — SIGNIFICANT CHANGE UP (ref 3.8–10.5)
WBC # BLD: 4.32 K/UL — SIGNIFICANT CHANGE UP (ref 3.8–10.5)
WBC # BLD: 4.77 K/UL — SIGNIFICANT CHANGE UP (ref 3.8–10.5)
WBC # BLD: 4.9 K/UL — SIGNIFICANT CHANGE UP (ref 3.8–10.5)
WBC # BLD: 4.97 K/UL — SIGNIFICANT CHANGE UP (ref 3.8–10.5)
WBC # BLD: 5.13 K/UL — SIGNIFICANT CHANGE UP (ref 3.8–10.5)
WBC # BLD: 5.37 K/UL — SIGNIFICANT CHANGE UP (ref 3.8–10.5)
WBC # BLD: 5.58 K/UL — SIGNIFICANT CHANGE UP (ref 3.8–10.5)
WBC # BLD: 5.68 K/UL — SIGNIFICANT CHANGE UP (ref 3.8–10.5)
WBC # BLD: 6.79 K/UL — SIGNIFICANT CHANGE UP (ref 3.8–10.5)
WBC # FLD AUTO: 2.37 K/UL — LOW (ref 3.8–10.5)
WBC # FLD AUTO: 2.47 K/UL — LOW (ref 3.8–10.5)
WBC # FLD AUTO: 2.69 K/UL — LOW (ref 3.8–10.5)
WBC # FLD AUTO: 3.15 K/UL — LOW (ref 3.8–10.5)
WBC # FLD AUTO: 3.5 K/UL — LOW (ref 3.8–10.5)
WBC # FLD AUTO: 3.59 K/UL — LOW (ref 3.8–10.5)
WBC # FLD AUTO: 3.67 K/UL — LOW (ref 3.8–10.5)
WBC # FLD AUTO: 3.87 K/UL — SIGNIFICANT CHANGE UP (ref 3.8–10.5)
WBC # FLD AUTO: 3.97 K/UL — SIGNIFICANT CHANGE UP (ref 3.8–10.5)
WBC # FLD AUTO: 4.01 K/UL — SIGNIFICANT CHANGE UP (ref 3.8–10.5)
WBC # FLD AUTO: 4.04 K/UL — SIGNIFICANT CHANGE UP (ref 3.8–10.5)
WBC # FLD AUTO: 4.18 K/UL — SIGNIFICANT CHANGE UP (ref 3.8–10.5)
WBC # FLD AUTO: 4.28 K/UL — SIGNIFICANT CHANGE UP (ref 3.8–10.5)
WBC # FLD AUTO: 4.32 K/UL — SIGNIFICANT CHANGE UP (ref 3.8–10.5)
WBC # FLD AUTO: 4.77 K/UL — SIGNIFICANT CHANGE UP (ref 3.8–10.5)
WBC # FLD AUTO: 4.9 K/UL — SIGNIFICANT CHANGE UP (ref 3.8–10.5)
WBC # FLD AUTO: 4.97 K/UL — SIGNIFICANT CHANGE UP (ref 3.8–10.5)
WBC # FLD AUTO: 5.13 K/UL — SIGNIFICANT CHANGE UP (ref 3.8–10.5)
WBC # FLD AUTO: 5.37 K/UL — SIGNIFICANT CHANGE UP (ref 3.8–10.5)
WBC # FLD AUTO: 5.58 K/UL — SIGNIFICANT CHANGE UP (ref 3.8–10.5)
WBC # FLD AUTO: 5.68 K/UL — SIGNIFICANT CHANGE UP (ref 3.8–10.5)
WBC # FLD AUTO: 6.79 K/UL — SIGNIFICANT CHANGE UP (ref 3.8–10.5)
WBC UR QL: SIGNIFICANT CHANGE UP /HPF (ref 0–5)
WBC UR QL: SIGNIFICANT CHANGE UP /HPF (ref 0–5)

## 2020-01-01 PROCEDURE — 84166 PROTEIN E-PHORESIS/URINE/CSF: CPT

## 2020-01-01 PROCEDURE — 83970 ASSAY OF PARATHORMONE: CPT

## 2020-01-01 PROCEDURE — 84165 PROTEIN E-PHORESIS SERUM: CPT

## 2020-01-01 PROCEDURE — 82330 ASSAY OF CALCIUM: CPT

## 2020-01-01 PROCEDURE — 84155 ASSAY OF PROTEIN SERUM: CPT

## 2020-01-01 PROCEDURE — 76705 ECHO EXAM OF ABDOMEN: CPT

## 2020-01-01 PROCEDURE — 86850 RBC ANTIBODY SCREEN: CPT

## 2020-01-01 PROCEDURE — 99232 SBSQ HOSP IP/OBS MODERATE 35: CPT

## 2020-01-01 PROCEDURE — 71045 X-RAY EXAM CHEST 1 VIEW: CPT | Mod: 26

## 2020-01-01 PROCEDURE — 82140 ASSAY OF AMMONIA: CPT

## 2020-01-01 PROCEDURE — 87150 DNA/RNA AMPLIFIED PROBE: CPT

## 2020-01-01 PROCEDURE — 95957 EEG DIGITAL ANALYSIS: CPT

## 2020-01-01 PROCEDURE — 95707 EEG W/O VID 2-12HR CONT MNTR: CPT

## 2020-01-01 PROCEDURE — 76705 ECHO EXAM OF ABDOMEN: CPT | Mod: 26

## 2020-01-01 PROCEDURE — 87186 SC STD MICRODIL/AGAR DIL: CPT

## 2020-01-01 PROCEDURE — 85027 COMPLETE CBC AUTOMATED: CPT

## 2020-01-01 PROCEDURE — 87641 MR-STAPH DNA AMP PROBE: CPT

## 2020-01-01 PROCEDURE — 82607 VITAMIN B-12: CPT

## 2020-01-01 PROCEDURE — 71250 CT THORAX DX C-: CPT | Mod: 26

## 2020-01-01 PROCEDURE — 99223 1ST HOSP IP/OBS HIGH 75: CPT

## 2020-01-01 PROCEDURE — 87640 STAPH A DNA AMP PROBE: CPT

## 2020-01-01 PROCEDURE — 80048 BASIC METABOLIC PNL TOTAL CA: CPT

## 2020-01-01 PROCEDURE — 94640 AIRWAY INHALATION TREATMENT: CPT

## 2020-01-01 PROCEDURE — 87070 CULTURE OTHR SPECIMN AEROBIC: CPT

## 2020-01-01 PROCEDURE — 82310 ASSAY OF CALCIUM: CPT

## 2020-01-01 PROCEDURE — 85397 CLOTTING FUNCT ACTIVITY: CPT

## 2020-01-01 PROCEDURE — 99291 CRITICAL CARE FIRST HOUR: CPT

## 2020-01-01 PROCEDURE — 83010 ASSAY OF HAPTOGLOBIN QUANT: CPT

## 2020-01-01 PROCEDURE — 87631 RESP VIRUS 3-5 TARGETS: CPT

## 2020-01-01 PROCEDURE — 82570 ASSAY OF URINE CREATININE: CPT

## 2020-01-01 PROCEDURE — 84443 ASSAY THYROID STIM HORMONE: CPT

## 2020-01-01 PROCEDURE — 80053 COMPREHEN METABOLIC PANEL: CPT

## 2020-01-01 PROCEDURE — 94003 VENT MGMT INPAT SUBQ DAY: CPT

## 2020-01-01 PROCEDURE — 83880 ASSAY OF NATRIURETIC PEPTIDE: CPT

## 2020-01-01 PROCEDURE — 36556 INSERT NON-TUNNEL CV CATH: CPT

## 2020-01-01 PROCEDURE — 84300 ASSAY OF URINE SODIUM: CPT

## 2020-01-01 PROCEDURE — 70450 CT HEAD/BRAIN W/O DYE: CPT | Mod: 26

## 2020-01-01 PROCEDURE — 93970 EXTREMITY STUDY: CPT

## 2020-01-01 PROCEDURE — 99285 EMERGENCY DEPT VISIT HI MDM: CPT

## 2020-01-01 PROCEDURE — 83935 ASSAY OF URINE OSMOLALITY: CPT

## 2020-01-01 PROCEDURE — 83735 ASSAY OF MAGNESIUM: CPT

## 2020-01-01 PROCEDURE — 82436 ASSAY OF URINE CHLORIDE: CPT

## 2020-01-01 PROCEDURE — 71045 X-RAY EXAM CHEST 1 VIEW: CPT | Mod: 26,77

## 2020-01-01 PROCEDURE — 99233 SBSQ HOSP IP/OBS HIGH 50: CPT

## 2020-01-01 PROCEDURE — 83615 LACTATE (LD) (LDH) ENZYME: CPT

## 2020-01-01 PROCEDURE — 71250 CT THORAX DX C-: CPT

## 2020-01-01 PROCEDURE — P9037: CPT

## 2020-01-01 PROCEDURE — 93970 EXTREMITY STUDY: CPT | Mod: 26

## 2020-01-01 PROCEDURE — 81001 URINALYSIS AUTO W/SCOPE: CPT

## 2020-01-01 PROCEDURE — 99222 1ST HOSP IP/OBS MODERATE 55: CPT

## 2020-01-01 PROCEDURE — 99285 EMERGENCY DEPT VISIT HI MDM: CPT | Mod: 25

## 2020-01-01 PROCEDURE — 83605 ASSAY OF LACTIC ACID: CPT

## 2020-01-01 PROCEDURE — 74177 CT ABD & PELVIS W/CONTRAST: CPT

## 2020-01-01 PROCEDURE — 74177 CT ABD & PELVIS W/CONTRAST: CPT | Mod: 26

## 2020-01-01 PROCEDURE — 93010 ELECTROCARDIOGRAM REPORT: CPT

## 2020-01-01 PROCEDURE — 70450 CT HEAD/BRAIN W/O DYE: CPT

## 2020-01-01 PROCEDURE — 85384 FIBRINOGEN ACTIVITY: CPT

## 2020-01-01 PROCEDURE — 87086 URINE CULTURE/COLONY COUNT: CPT

## 2020-01-01 PROCEDURE — 87040 BLOOD CULTURE FOR BACTERIA: CPT

## 2020-01-01 PROCEDURE — 86901 BLOOD TYPING SEROLOGIC RH(D): CPT

## 2020-01-01 PROCEDURE — 82746 ASSAY OF FOLIC ACID SERUM: CPT

## 2020-01-01 PROCEDURE — 93306 TTE W/DOPPLER COMPLETE: CPT

## 2020-01-01 PROCEDURE — 82306 VITAMIN D 25 HYDROXY: CPT

## 2020-01-01 PROCEDURE — 83036 HEMOGLOBIN GLYCOSYLATED A1C: CPT

## 2020-01-01 PROCEDURE — 71045 X-RAY EXAM CHEST 1 VIEW: CPT

## 2020-01-01 PROCEDURE — 94760 N-INVAS EAR/PLS OXIMETRY 1: CPT

## 2020-01-01 PROCEDURE — 82652 VIT D 1 25-DIHYDROXY: CPT

## 2020-01-01 PROCEDURE — 95819 EEG AWAKE AND ASLEEP: CPT | Mod: 26

## 2020-01-01 PROCEDURE — 86022 PLATELET ANTIBODIES: CPT

## 2020-01-01 PROCEDURE — 95819 EEG AWAKE AND ASLEEP: CPT

## 2020-01-01 PROCEDURE — 36140 INTRO NDL ICATH UPR/LXTR ART: CPT

## 2020-01-01 PROCEDURE — 84484 ASSAY OF TROPONIN QUANT: CPT

## 2020-01-01 PROCEDURE — 82962 GLUCOSE BLOOD TEST: CPT

## 2020-01-01 PROCEDURE — 87205 SMEAR GRAM STAIN: CPT

## 2020-01-01 PROCEDURE — 85610 PROTHROMBIN TIME: CPT

## 2020-01-01 PROCEDURE — 80061 LIPID PANEL: CPT

## 2020-01-01 PROCEDURE — 86803 HEPATITIS C AB TEST: CPT

## 2020-01-01 PROCEDURE — 36430 TRANSFUSION BLD/BLD COMPNT: CPT

## 2020-01-01 PROCEDURE — 74176 CT ABD & PELVIS W/O CONTRAST: CPT

## 2020-01-01 PROCEDURE — 82803 BLOOD GASES ANY COMBINATION: CPT

## 2020-01-01 PROCEDURE — 82550 ASSAY OF CK (CPK): CPT

## 2020-01-01 PROCEDURE — 80202 ASSAY OF VANCOMYCIN: CPT

## 2020-01-01 PROCEDURE — 85730 THROMBOPLASTIN TIME PARTIAL: CPT

## 2020-01-01 PROCEDURE — 80074 ACUTE HEPATITIS PANEL: CPT

## 2020-01-01 PROCEDURE — 93005 ELECTROCARDIOGRAM TRACING: CPT

## 2020-01-01 PROCEDURE — 85379 FIBRIN DEGRADATION QUANT: CPT

## 2020-01-01 PROCEDURE — 82553 CREATINE MB FRACTION: CPT

## 2020-01-01 PROCEDURE — 74176 CT ABD & PELVIS W/O CONTRAST: CPT | Mod: 26

## 2020-01-01 PROCEDURE — 82378 CARCINOEMBRYONIC ANTIGEN: CPT

## 2020-01-01 PROCEDURE — 86334 IMMUNOFIX E-PHORESIS SERUM: CPT

## 2020-01-01 PROCEDURE — 83930 ASSAY OF BLOOD OSMOLALITY: CPT

## 2020-01-01 PROCEDURE — 86900 BLOOD TYPING SEROLOGIC ABO: CPT

## 2020-01-01 PROCEDURE — 96374 THER/PROPH/DIAG INJ IV PUSH: CPT | Mod: XU

## 2020-01-01 PROCEDURE — 84100 ASSAY OF PHOSPHORUS: CPT

## 2020-01-01 PROCEDURE — 36415 COLL VENOUS BLD VENIPUNCTURE: CPT

## 2020-01-01 PROCEDURE — 97162 PT EVAL MOD COMPLEX 30 MIN: CPT

## 2020-01-01 PROCEDURE — 94002 VENT MGMT INPAT INIT DAY: CPT

## 2020-01-01 PROCEDURE — 83519 RIA NONANTIBODY: CPT

## 2020-01-01 RX ORDER — INSULIN LISPRO 100/ML
VIAL (ML) SUBCUTANEOUS
Refills: 0 | Status: DISCONTINUED | OUTPATIENT
Start: 2020-01-01 | End: 2020-01-01

## 2020-01-01 RX ORDER — SODIUM CHLORIDE 9 MG/ML
500 INJECTION INTRAMUSCULAR; INTRAVENOUS; SUBCUTANEOUS ONCE
Refills: 0 | Status: DISCONTINUED | OUTPATIENT
Start: 2020-01-01 | End: 2020-01-01

## 2020-01-01 RX ORDER — VANCOMYCIN HCL 1 G
1000 VIAL (EA) INTRAVENOUS ONCE
Refills: 0 | Status: DISCONTINUED | OUTPATIENT
Start: 2020-01-01 | End: 2020-01-01

## 2020-01-01 RX ORDER — PROPOFOL 10 MG/ML
5 INJECTION, EMULSION INTRAVENOUS
Qty: 1000 | Refills: 0 | Status: DISCONTINUED | OUTPATIENT
Start: 2020-01-01 | End: 2020-01-01

## 2020-01-01 RX ORDER — DEXTROSE 50 % IN WATER 50 %
50 SYRINGE (ML) INTRAVENOUS ONCE
Refills: 0 | Status: DISCONTINUED | OUTPATIENT
Start: 2020-01-01 | End: 2020-01-01

## 2020-01-01 RX ORDER — FENTANYL CITRATE 50 UG/ML
25 INJECTION INTRAVENOUS EVERY 4 HOURS
Refills: 0 | Status: DISCONTINUED | OUTPATIENT
Start: 2020-01-01 | End: 2020-01-01

## 2020-01-01 RX ORDER — HEPARIN SODIUM 5000 [USP'U]/ML
INJECTION INTRAVENOUS; SUBCUTANEOUS
Qty: 25000 | Refills: 0 | Status: DISCONTINUED | OUTPATIENT
Start: 2020-01-01 | End: 2020-01-01

## 2020-01-01 RX ORDER — METOPROLOL TARTRATE 50 MG
2.5 TABLET ORAL ONCE
Refills: 0 | Status: COMPLETED | OUTPATIENT
Start: 2020-01-01 | End: 2020-01-01

## 2020-01-01 RX ORDER — METOPROLOL TARTRATE 50 MG
5 TABLET ORAL ONCE
Refills: 0 | Status: COMPLETED | OUTPATIENT
Start: 2020-01-01 | End: 2020-01-01

## 2020-01-01 RX ORDER — METOPROLOL TARTRATE 50 MG
25 TABLET ORAL
Refills: 0 | Status: DISCONTINUED | OUTPATIENT
Start: 2020-01-01 | End: 2020-01-01

## 2020-01-01 RX ORDER — DEXTROSE 50 % IN WATER 50 %
50 SYRINGE (ML) INTRAVENOUS ONCE
Refills: 0 | Status: COMPLETED | OUTPATIENT
Start: 2020-01-01 | End: 2020-01-01

## 2020-01-01 RX ORDER — POTASSIUM PHOSPHATE, MONOBASIC POTASSIUM PHOSPHATE, DIBASIC 236; 224 MG/ML; MG/ML
15 INJECTION, SOLUTION INTRAVENOUS ONCE
Refills: 0 | Status: COMPLETED | OUTPATIENT
Start: 2020-01-01 | End: 2020-01-01

## 2020-01-01 RX ORDER — DEXMEDETOMIDINE HYDROCHLORIDE IN 0.9% SODIUM CHLORIDE 4 UG/ML
0.2 INJECTION INTRAVENOUS
Qty: 200 | Refills: 0 | Status: DISCONTINUED | OUTPATIENT
Start: 2020-01-01 | End: 2020-01-01

## 2020-01-01 RX ORDER — VASOPRESSIN 20 [USP'U]/ML
0.04 INJECTION INTRAVENOUS
Qty: 50 | Refills: 0 | Status: DISCONTINUED | OUTPATIENT
Start: 2020-01-01 | End: 2020-01-01

## 2020-01-01 RX ORDER — FOLIC ACID 0.8 MG
1 TABLET ORAL DAILY
Refills: 0 | Status: DISCONTINUED | OUTPATIENT
Start: 2020-01-01 | End: 2020-01-01

## 2020-01-01 RX ORDER — INSULIN HUMAN 100 [IU]/ML
5 INJECTION, SOLUTION SUBCUTANEOUS ONCE
Refills: 0 | Status: COMPLETED | OUTPATIENT
Start: 2020-01-01 | End: 2020-01-01

## 2020-01-01 RX ORDER — NOREPINEPHRINE BITARTRATE/D5W 8 MG/250ML
0.05 PLASTIC BAG, INJECTION (ML) INTRAVENOUS
Qty: 16 | Refills: 0 | Status: DISCONTINUED | OUTPATIENT
Start: 2020-01-01 | End: 2020-01-01

## 2020-01-01 RX ORDER — SODIUM CHLORIDE 9 MG/ML
1000 INJECTION INTRAMUSCULAR; INTRAVENOUS; SUBCUTANEOUS ONCE
Refills: 0 | Status: COMPLETED | OUTPATIENT
Start: 2020-01-01 | End: 2020-01-01

## 2020-01-01 RX ORDER — THIAMINE MONONITRATE (VIT B1) 100 MG
500 TABLET ORAL DAILY
Refills: 0 | Status: DISCONTINUED | OUTPATIENT
Start: 2020-01-01 | End: 2020-01-01

## 2020-01-01 RX ORDER — FUROSEMIDE 40 MG
80 TABLET ORAL ONCE
Refills: 0 | Status: COMPLETED | OUTPATIENT
Start: 2020-01-01 | End: 2020-01-01

## 2020-01-01 RX ORDER — PIPERACILLIN AND TAZOBACTAM 4; .5 G/20ML; G/20ML
3.38 INJECTION, POWDER, LYOPHILIZED, FOR SOLUTION INTRAVENOUS EVERY 8 HOURS
Refills: 0 | Status: DISCONTINUED | OUTPATIENT
Start: 2020-01-01 | End: 2020-01-01

## 2020-01-01 RX ORDER — PHENYLEPHRINE HYDROCHLORIDE 10 MG/ML
2.7 INJECTION INTRAVENOUS
Qty: 40 | Refills: 0 | Status: DISCONTINUED | OUTPATIENT
Start: 2020-01-01 | End: 2020-01-01

## 2020-01-01 RX ORDER — SODIUM CHLORIDE 9 MG/ML
10 INJECTION INTRAMUSCULAR; INTRAVENOUS; SUBCUTANEOUS
Refills: 0 | Status: DISCONTINUED | OUTPATIENT
Start: 2020-01-01 | End: 2020-01-01

## 2020-01-01 RX ORDER — CHLORHEXIDINE GLUCONATE 213 G/1000ML
1 SOLUTION TOPICAL DAILY
Refills: 0 | Status: DISCONTINUED | OUTPATIENT
Start: 2020-01-01 | End: 2020-01-01

## 2020-01-01 RX ORDER — SODIUM CHLORIDE 9 MG/ML
1000 INJECTION, SOLUTION INTRAVENOUS
Refills: 0 | Status: DISCONTINUED | OUTPATIENT
Start: 2020-01-01 | End: 2020-01-01

## 2020-01-01 RX ORDER — SODIUM CHLORIDE 9 MG/ML
1000 INJECTION INTRAMUSCULAR; INTRAVENOUS; SUBCUTANEOUS ONCE
Refills: 0 | Status: DISCONTINUED | OUTPATIENT
Start: 2020-01-01 | End: 2020-01-01

## 2020-01-01 RX ORDER — SODIUM CHLORIDE 9 MG/ML
500 INJECTION, SOLUTION INTRAVENOUS
Refills: 0 | Status: DISCONTINUED | OUTPATIENT
Start: 2020-01-01 | End: 2020-01-01

## 2020-01-01 RX ORDER — SODIUM CHLORIDE 9 MG/ML
1000 INJECTION INTRAMUSCULAR; INTRAVENOUS; SUBCUTANEOUS
Refills: 0 | Status: DISCONTINUED | OUTPATIENT
Start: 2020-01-01 | End: 2020-01-01

## 2020-01-01 RX ORDER — DEXMEDETOMIDINE HYDROCHLORIDE IN 0.9% SODIUM CHLORIDE 4 UG/ML
0.7 INJECTION INTRAVENOUS
Qty: 200 | Refills: 0 | Status: DISCONTINUED | OUTPATIENT
Start: 2020-01-01 | End: 2020-01-01

## 2020-01-01 RX ORDER — PHENYLEPHRINE HYDROCHLORIDE 10 MG/ML
2.7 INJECTION INTRAVENOUS
Qty: 160 | Refills: 0 | Status: DISCONTINUED | OUTPATIENT
Start: 2020-01-01 | End: 2020-01-01

## 2020-01-01 RX ORDER — PHENOBARBITAL 60 MG
130 TABLET ORAL EVERY 6 HOURS
Refills: 0 | Status: DISCONTINUED | OUTPATIENT
Start: 2020-01-01 | End: 2020-01-01

## 2020-01-01 RX ORDER — PANTOPRAZOLE SODIUM 20 MG/1
40 TABLET, DELAYED RELEASE ORAL DAILY
Refills: 0 | Status: DISCONTINUED | OUTPATIENT
Start: 2020-01-01 | End: 2020-01-01

## 2020-01-01 RX ORDER — PHENYLEPHRINE HYDROCHLORIDE 10 MG/ML
0.05 INJECTION INTRAVENOUS
Qty: 160 | Refills: 0 | Status: DISCONTINUED | OUTPATIENT
Start: 2020-01-01 | End: 2020-01-01

## 2020-01-01 RX ORDER — FUROSEMIDE 40 MG
40 TABLET ORAL DAILY
Refills: 0 | Status: DISCONTINUED | OUTPATIENT
Start: 2020-01-01 | End: 2020-01-01

## 2020-01-01 RX ORDER — CHLORHEXIDINE GLUCONATE 213 G/1000ML
15 SOLUTION TOPICAL EVERY 12 HOURS
Refills: 0 | Status: DISCONTINUED | OUTPATIENT
Start: 2020-01-01 | End: 2020-01-01

## 2020-01-01 RX ORDER — DABIGATRAN ETEXILATE MESYLATE 150 MG/1
1 CAPSULE ORAL
Qty: 0 | Refills: 0 | DISCHARGE

## 2020-01-01 RX ORDER — LACTULOSE 10 G/15ML
10 SOLUTION ORAL
Refills: 0 | Status: DISCONTINUED | OUTPATIENT
Start: 2020-01-01 | End: 2020-01-01

## 2020-01-01 RX ORDER — PHENYLEPHRINE HYDROCHLORIDE 10 MG/ML
0.5 INJECTION INTRAVENOUS
Qty: 40 | Refills: 0 | Status: DISCONTINUED | OUTPATIENT
Start: 2020-01-01 | End: 2020-01-01

## 2020-01-01 RX ORDER — METOCLOPRAMIDE HCL 10 MG
5 TABLET ORAL EVERY 8 HOURS
Refills: 0 | Status: DISCONTINUED | OUTPATIENT
Start: 2020-01-01 | End: 2020-01-01

## 2020-01-01 RX ORDER — ACETAMINOPHEN 500 MG
1250 TABLET ORAL ONCE
Refills: 0 | Status: DISCONTINUED | OUTPATIENT
Start: 2020-01-01 | End: 2020-01-01

## 2020-01-01 RX ORDER — ACETAMINOPHEN 500 MG
1000 TABLET ORAL ONCE
Refills: 0 | Status: COMPLETED | OUTPATIENT
Start: 2020-01-01 | End: 2020-01-01

## 2020-01-01 RX ORDER — AMIODARONE HYDROCHLORIDE 400 MG/1
150 TABLET ORAL ONCE
Refills: 0 | Status: DISCONTINUED | OUTPATIENT
Start: 2020-01-01 | End: 2020-01-01

## 2020-01-01 RX ORDER — IPRATROPIUM/ALBUTEROL SULFATE 18-103MCG
3 AEROSOL WITH ADAPTER (GRAM) INHALATION
Refills: 0 | Status: COMPLETED | OUTPATIENT
Start: 2020-01-01 | End: 2020-01-01

## 2020-01-01 RX ORDER — SODIUM CHLORIDE 9 MG/ML
500 INJECTION, SOLUTION INTRAVENOUS ONCE
Refills: 0 | Status: DISCONTINUED | OUTPATIENT
Start: 2020-01-01 | End: 2020-01-01

## 2020-01-01 RX ORDER — ZOLEDRONIC ACID 5 MG/100ML
4 INJECTION, SOLUTION INTRAVENOUS ONCE
Refills: 0 | Status: COMPLETED | OUTPATIENT
Start: 2020-01-01 | End: 2020-01-01

## 2020-01-01 RX ORDER — CALCITONIN SALMON 200 [IU]/ML
500 INJECTION, SOLUTION INTRAMUSCULAR EVERY 12 HOURS
Refills: 0 | Status: COMPLETED | OUTPATIENT
Start: 2020-01-01 | End: 2020-01-01

## 2020-01-01 RX ORDER — PROPOFOL 10 MG/ML
5.44 INJECTION, EMULSION INTRAVENOUS
Qty: 1000 | Refills: 0 | Status: DISCONTINUED | OUTPATIENT
Start: 2020-01-01 | End: 2020-01-01

## 2020-01-01 RX ORDER — HEPARIN SODIUM 5000 [USP'U]/ML
5000 INJECTION INTRAVENOUS; SUBCUTANEOUS EVERY 12 HOURS
Refills: 0 | Status: DISCONTINUED | OUTPATIENT
Start: 2020-01-01 | End: 2020-01-01

## 2020-01-01 RX ORDER — ACETAMINOPHEN 500 MG
650 TABLET ORAL ONCE
Refills: 0 | Status: DISCONTINUED | OUTPATIENT
Start: 2020-01-01 | End: 2020-01-01

## 2020-01-01 RX ORDER — NOREPINEPHRINE BITARTRATE/D5W 8 MG/250ML
0.13 PLASTIC BAG, INJECTION (ML) INTRAVENOUS
Qty: 16 | Refills: 0 | Status: DISCONTINUED | OUTPATIENT
Start: 2020-01-01 | End: 2020-01-01

## 2020-01-01 RX ORDER — MIDODRINE HYDROCHLORIDE 2.5 MG/1
5 TABLET ORAL EVERY 8 HOURS
Refills: 0 | Status: DISCONTINUED | OUTPATIENT
Start: 2020-01-01 | End: 2020-01-01

## 2020-01-01 RX ORDER — CEFEPIME 1 G/1
INJECTION, POWDER, FOR SOLUTION INTRAMUSCULAR; INTRAVENOUS
Refills: 0 | Status: DISCONTINUED | OUTPATIENT
Start: 2020-01-01 | End: 2020-01-01

## 2020-01-01 RX ORDER — SODIUM CHLORIDE 9 MG/ML
500 INJECTION, SOLUTION INTRAVENOUS ONCE
Refills: 0 | Status: COMPLETED | OUTPATIENT
Start: 2020-01-01 | End: 2020-01-01

## 2020-01-01 RX ORDER — DEXMEDETOMIDINE HYDROCHLORIDE IN 0.9% SODIUM CHLORIDE 4 UG/ML
0.8 INJECTION INTRAVENOUS
Qty: 400 | Refills: 0 | Status: DISCONTINUED | OUTPATIENT
Start: 2020-01-01 | End: 2020-01-01

## 2020-01-01 RX ORDER — THIAMINE MONONITRATE (VIT B1) 100 MG
500 TABLET ORAL EVERY 8 HOURS
Refills: 0 | Status: COMPLETED | OUTPATIENT
Start: 2020-01-01 | End: 2020-01-01

## 2020-01-01 RX ORDER — METOCLOPRAMIDE HCL 10 MG
5 TABLET ORAL ONCE
Refills: 0 | Status: COMPLETED | OUTPATIENT
Start: 2020-01-01 | End: 2020-01-01

## 2020-01-01 RX ORDER — LACTULOSE 10 G/15ML
30 SOLUTION ORAL EVERY 8 HOURS
Refills: 0 | Status: DISCONTINUED | OUTPATIENT
Start: 2020-01-01 | End: 2020-01-01

## 2020-01-01 RX ORDER — VANCOMYCIN HCL 1 G
1750 VIAL (EA) INTRAVENOUS EVERY 12 HOURS
Refills: 0 | Status: DISCONTINUED | OUTPATIENT
Start: 2020-01-01 | End: 2020-01-01

## 2020-01-01 RX ORDER — MORPHINE SULFATE 50 MG/1
2 CAPSULE, EXTENDED RELEASE ORAL ONCE
Refills: 0 | Status: DISCONTINUED | OUTPATIENT
Start: 2020-01-01 | End: 2020-01-01

## 2020-01-01 RX ORDER — DEXMEDETOMIDINE HYDROCHLORIDE IN 0.9% SODIUM CHLORIDE 4 UG/ML
0.6 INJECTION INTRAVENOUS
Qty: 200 | Refills: 0 | Status: DISCONTINUED | OUTPATIENT
Start: 2020-01-01 | End: 2020-01-01

## 2020-01-01 RX ORDER — VANCOMYCIN HCL 1 G
1000 VIAL (EA) INTRAVENOUS
Refills: 0 | Status: DISCONTINUED | OUTPATIENT
Start: 2020-01-01 | End: 2020-01-01

## 2020-01-01 RX ORDER — PIPERACILLIN AND TAZOBACTAM 4; .5 G/20ML; G/20ML
3.38 INJECTION, POWDER, LYOPHILIZED, FOR SOLUTION INTRAVENOUS ONCE
Refills: 0 | Status: COMPLETED | OUTPATIENT
Start: 2020-01-01 | End: 2020-01-01

## 2020-01-01 RX ORDER — AMPICILLIN SODIUM AND SULBACTAM SODIUM 250; 125 MG/ML; MG/ML
INJECTION, POWDER, FOR SUSPENSION INTRAMUSCULAR; INTRAVENOUS
Refills: 0 | Status: DISCONTINUED | OUTPATIENT
Start: 2020-01-01 | End: 2020-01-01

## 2020-01-01 RX ORDER — MORPHINE SULFATE 50 MG/1
4 CAPSULE, EXTENDED RELEASE ORAL ONCE
Refills: 0 | Status: DISCONTINUED | OUTPATIENT
Start: 2020-01-01 | End: 2020-01-01

## 2020-01-01 RX ORDER — ATORVASTATIN CALCIUM 80 MG/1
40 TABLET, FILM COATED ORAL AT BEDTIME
Refills: 0 | Status: DISCONTINUED | OUTPATIENT
Start: 2020-01-01 | End: 2020-01-01

## 2020-01-01 RX ORDER — ACETAMINOPHEN 500 MG
650 TABLET ORAL ONCE
Refills: 0 | Status: COMPLETED | OUTPATIENT
Start: 2020-01-01 | End: 2020-01-01

## 2020-01-01 RX ORDER — QUETIAPINE FUMARATE 200 MG/1
25 TABLET, FILM COATED ORAL EVERY 12 HOURS
Refills: 0 | Status: DISCONTINUED | OUTPATIENT
Start: 2020-01-01 | End: 2020-01-01

## 2020-01-01 RX ORDER — FUROSEMIDE 40 MG
40 TABLET ORAL
Refills: 0 | Status: DISCONTINUED | OUTPATIENT
Start: 2020-01-01 | End: 2020-01-01

## 2020-01-01 RX ORDER — AMPICILLIN SODIUM AND SULBACTAM SODIUM 250; 125 MG/ML; MG/ML
3 INJECTION, POWDER, FOR SUSPENSION INTRAMUSCULAR; INTRAVENOUS ONCE
Refills: 0 | Status: COMPLETED | OUTPATIENT
Start: 2020-01-01 | End: 2020-01-01

## 2020-01-01 RX ORDER — CALCITONIN SALMON 200 [IU]/ML
400 INJECTION, SOLUTION INTRAMUSCULAR EVERY 12 HOURS
Refills: 0 | Status: COMPLETED | OUTPATIENT
Start: 2020-01-01 | End: 2020-01-01

## 2020-01-01 RX ORDER — AMPICILLIN SODIUM AND SULBACTAM SODIUM 250; 125 MG/ML; MG/ML
3 INJECTION, POWDER, FOR SUSPENSION INTRAMUSCULAR; INTRAVENOUS EVERY 6 HOURS
Refills: 0 | Status: DISCONTINUED | OUTPATIENT
Start: 2020-01-01 | End: 2020-01-01

## 2020-01-01 RX ORDER — PROPOFOL 10 MG/ML
30 INJECTION, EMULSION INTRAVENOUS
Qty: 1000 | Refills: 0 | Status: DISCONTINUED | OUTPATIENT
Start: 2020-01-01 | End: 2020-01-01

## 2020-01-01 RX ORDER — SODIUM ZIRCONIUM CYCLOSILICATE 10 G/10G
10 POWDER, FOR SUSPENSION ORAL ONCE
Refills: 0 | Status: COMPLETED | OUTPATIENT
Start: 2020-01-01 | End: 2020-01-01

## 2020-01-01 RX ORDER — FENTANYL CITRATE 50 UG/ML
1 INJECTION INTRAVENOUS
Qty: 2500 | Refills: 0 | Status: DISCONTINUED | OUTPATIENT
Start: 2020-01-01 | End: 2020-01-01

## 2020-01-01 RX ORDER — CEFEPIME 1 G/1
2000 INJECTION, POWDER, FOR SOLUTION INTRAMUSCULAR; INTRAVENOUS ONCE
Refills: 0 | Status: COMPLETED | OUTPATIENT
Start: 2020-01-01 | End: 2020-01-01

## 2020-01-01 RX ORDER — INSULIN LISPRO 100/ML
VIAL (ML) SUBCUTANEOUS EVERY 6 HOURS
Refills: 0 | Status: DISCONTINUED | OUTPATIENT
Start: 2020-01-01 | End: 2020-01-01

## 2020-01-01 RX ORDER — ETOMIDATE 2 MG/ML
20 INJECTION INTRAVENOUS ONCE
Refills: 0 | Status: COMPLETED | OUTPATIENT
Start: 2020-01-01 | End: 2020-01-01

## 2020-01-01 RX ORDER — SODIUM ZIRCONIUM CYCLOSILICATE 10 G/10G
5 POWDER, FOR SUSPENSION ORAL ONCE
Refills: 0 | Status: COMPLETED | OUTPATIENT
Start: 2020-01-01 | End: 2020-01-01

## 2020-01-01 RX ORDER — MORPHINE SULFATE 50 MG/1
4 CAPSULE, EXTENDED RELEASE ORAL EVERY 4 HOURS
Refills: 0 | Status: DISCONTINUED | OUTPATIENT
Start: 2020-01-01 | End: 2020-01-01

## 2020-01-01 RX ORDER — SODIUM ZIRCONIUM CYCLOSILICATE 10 G/10G
5 POWDER, FOR SUSPENSION ORAL EVERY 8 HOURS
Refills: 0 | Status: DISCONTINUED | OUTPATIENT
Start: 2020-01-01 | End: 2020-01-01

## 2020-01-01 RX ORDER — DABIGATRAN ETEXILATE MESYLATE 150 MG/1
150 CAPSULE ORAL EVERY 12 HOURS
Refills: 0 | Status: DISCONTINUED | OUTPATIENT
Start: 2020-01-01 | End: 2020-01-01

## 2020-01-01 RX ORDER — CHLORHEXIDINE GLUCONATE 213 G/1000ML
1 SOLUTION TOPICAL
Refills: 0 | Status: DISCONTINUED | OUTPATIENT
Start: 2020-01-01 | End: 2020-01-01

## 2020-01-01 RX ORDER — ASPIRIN/CALCIUM CARB/MAGNESIUM 324 MG
81 TABLET ORAL DAILY
Refills: 0 | Status: DISCONTINUED | OUTPATIENT
Start: 2020-01-01 | End: 2020-01-01

## 2020-01-01 RX ORDER — CEFEPIME 1 G/1
2000 INJECTION, POWDER, FOR SOLUTION INTRAMUSCULAR; INTRAVENOUS EVERY 8 HOURS
Refills: 0 | Status: DISCONTINUED | OUTPATIENT
Start: 2020-01-01 | End: 2020-01-01

## 2020-01-01 RX ORDER — NYSTATIN CREAM 100000 [USP'U]/G
1 CREAM TOPICAL THREE TIMES A DAY
Refills: 0 | Status: DISCONTINUED | OUTPATIENT
Start: 2020-01-01 | End: 2020-01-01

## 2020-01-01 RX ORDER — METOCLOPRAMIDE HCL 10 MG
10 TABLET ORAL EVERY 6 HOURS
Refills: 0 | Status: DISCONTINUED | OUTPATIENT
Start: 2020-01-01 | End: 2020-01-01

## 2020-01-01 RX ORDER — CALCIUM GLUCONATE 100 MG/ML
1 VIAL (ML) INTRAVENOUS ONCE
Refills: 0 | Status: COMPLETED | OUTPATIENT
Start: 2020-01-01 | End: 2020-01-01

## 2020-01-01 RX ORDER — PANTOPRAZOLE SODIUM 20 MG/1
40 TABLET, DELAYED RELEASE ORAL
Refills: 0 | Status: DISCONTINUED | OUTPATIENT
Start: 2020-01-01 | End: 2020-01-01

## 2020-01-01 RX ORDER — PROPOFOL 10 MG/ML
20 INJECTION, EMULSION INTRAVENOUS ONCE
Refills: 0 | Status: COMPLETED | OUTPATIENT
Start: 2020-01-01 | End: 2020-01-01

## 2020-01-01 RX ORDER — CHLORHEXIDINE GLUCONATE 213 G/1000ML
15 SOLUTION TOPICAL
Refills: 0 | Status: DISCONTINUED | OUTPATIENT
Start: 2020-01-01 | End: 2020-01-01

## 2020-01-01 RX ORDER — ACETAMINOPHEN 500 MG
1000 TABLET ORAL ONCE
Refills: 0 | Status: DISCONTINUED | OUTPATIENT
Start: 2020-01-01 | End: 2020-01-01

## 2020-01-01 RX ORDER — VANCOMYCIN HCL 1 G
1250 VIAL (EA) INTRAVENOUS EVERY 12 HOURS
Refills: 0 | Status: DISCONTINUED | OUTPATIENT
Start: 2020-01-01 | End: 2020-01-01

## 2020-01-01 RX ADMIN — PROPOFOL 4.08 MICROGRAM(S)/KG/MIN: 10 INJECTION, EMULSION INTRAVENOUS at 11:00

## 2020-01-01 RX ADMIN — Medication 105 MILLIGRAM(S): at 13:17

## 2020-01-01 RX ADMIN — AMPICILLIN SODIUM AND SULBACTAM SODIUM 200 GRAM(S): 250; 125 INJECTION, POWDER, FOR SUSPENSION INTRAMUSCULAR; INTRAVENOUS at 23:14

## 2020-01-01 RX ADMIN — CEFEPIME 100 MILLIGRAM(S): 1 INJECTION, POWDER, FOR SOLUTION INTRAMUSCULAR; INTRAVENOUS at 21:49

## 2020-01-01 RX ADMIN — Medication 2 MILLIGRAM(S): at 08:27

## 2020-01-01 RX ADMIN — Medication 2 MILLIGRAM(S): at 11:46

## 2020-01-01 RX ADMIN — Medication 5 MILLIGRAM(S): at 19:00

## 2020-01-01 RX ADMIN — DEXMEDETOMIDINE HYDROCHLORIDE IN 0.9% SODIUM CHLORIDE 21.88 MICROGRAM(S)/KG/HR: 4 INJECTION INTRAVENOUS at 13:52

## 2020-01-01 RX ADMIN — AMPICILLIN SODIUM AND SULBACTAM SODIUM 200 GRAM(S): 250; 125 INJECTION, POWDER, FOR SUSPENSION INTRAMUSCULAR; INTRAVENOUS at 10:21

## 2020-01-01 RX ADMIN — Medication 5.86 MICROGRAM(S)/KG/MIN: at 12:30

## 2020-01-01 RX ADMIN — NYSTATIN CREAM 1 APPLICATION(S): 100000 CREAM TOPICAL at 05:49

## 2020-01-01 RX ADMIN — DEXMEDETOMIDINE HYDROCHLORIDE IN 0.9% SODIUM CHLORIDE 18.75 MICROGRAM(S)/KG/HR: 4 INJECTION INTRAVENOUS at 01:03

## 2020-01-01 RX ADMIN — Medication 250 MILLIGRAM(S): at 21:59

## 2020-01-01 RX ADMIN — Medication 5 MILLIGRAM(S): at 23:17

## 2020-01-01 RX ADMIN — Medication 2 MILLIGRAM(S): at 04:08

## 2020-01-01 RX ADMIN — MIDODRINE HYDROCHLORIDE 5 MILLIGRAM(S): 2.5 TABLET ORAL at 13:05

## 2020-01-01 RX ADMIN — PANTOPRAZOLE SODIUM 40 MILLIGRAM(S): 20 TABLET, DELAYED RELEASE ORAL at 11:01

## 2020-01-01 RX ADMIN — AMPICILLIN SODIUM AND SULBACTAM SODIUM 200 GRAM(S): 250; 125 INJECTION, POWDER, FOR SUSPENSION INTRAMUSCULAR; INTRAVENOUS at 05:52

## 2020-01-01 RX ADMIN — Medication 1 TABLET(S): at 12:07

## 2020-01-01 RX ADMIN — Medication 2 MILLIGRAM(S): at 08:12

## 2020-01-01 RX ADMIN — CEFEPIME 100 MILLIGRAM(S): 1 INJECTION, POWDER, FOR SOLUTION INTRAMUSCULAR; INTRAVENOUS at 13:03

## 2020-01-01 RX ADMIN — Medication 15.23 MICROGRAM(S)/KG/MIN: at 00:05

## 2020-01-01 RX ADMIN — QUETIAPINE FUMARATE 25 MILLIGRAM(S): 200 TABLET, FILM COATED ORAL at 17:08

## 2020-01-01 RX ADMIN — Medication 2 MILLIGRAM(S): at 17:39

## 2020-01-01 RX ADMIN — MORPHINE SULFATE 4 MILLIGRAM(S): 50 CAPSULE, EXTENDED RELEASE ORAL at 05:53

## 2020-01-01 RX ADMIN — Medication 10 MILLIGRAM(S): at 12:25

## 2020-01-01 RX ADMIN — CHLORHEXIDINE GLUCONATE 15 MILLILITER(S): 213 SOLUTION TOPICAL at 17:04

## 2020-01-01 RX ADMIN — Medication 1: at 17:17

## 2020-01-01 RX ADMIN — Medication 2 MILLIGRAM(S): at 09:48

## 2020-01-01 RX ADMIN — MORPHINE SULFATE 4 MILLIGRAM(S): 50 CAPSULE, EXTENDED RELEASE ORAL at 17:43

## 2020-01-01 RX ADMIN — AMPICILLIN SODIUM AND SULBACTAM SODIUM 200 GRAM(S): 250; 125 INJECTION, POWDER, FOR SUSPENSION INTRAMUSCULAR; INTRAVENOUS at 17:39

## 2020-01-01 RX ADMIN — Medication 250 MILLIGRAM(S): at 20:21

## 2020-01-01 RX ADMIN — DEXMEDETOMIDINE HYDROCHLORIDE IN 0.9% SODIUM CHLORIDE 18.75 MICROGRAM(S)/KG/HR: 4 INJECTION INTRAVENOUS at 05:12

## 2020-01-01 RX ADMIN — DEXMEDETOMIDINE HYDROCHLORIDE IN 0.9% SODIUM CHLORIDE 21.88 MICROGRAM(S)/KG/HR: 4 INJECTION INTRAVENOUS at 18:08

## 2020-01-01 RX ADMIN — Medication 105 MILLIGRAM(S): at 22:35

## 2020-01-01 RX ADMIN — Medication 2 MILLIGRAM(S): at 02:32

## 2020-01-01 RX ADMIN — MORPHINE SULFATE 2 MILLIGRAM(S): 50 CAPSULE, EXTENDED RELEASE ORAL at 15:21

## 2020-01-01 RX ADMIN — Medication 250 MILLIGRAM(S): at 09:08

## 2020-01-01 RX ADMIN — Medication 1 TABLET(S): at 11:23

## 2020-01-01 RX ADMIN — AMPICILLIN SODIUM AND SULBACTAM SODIUM 200 GRAM(S): 250; 125 INJECTION, POWDER, FOR SUSPENSION INTRAMUSCULAR; INTRAVENOUS at 13:00

## 2020-01-01 RX ADMIN — DEXMEDETOMIDINE HYDROCHLORIDE IN 0.9% SODIUM CHLORIDE 25 MICROGRAM(S)/KG/HR: 4 INJECTION INTRAVENOUS at 21:00

## 2020-01-01 RX ADMIN — CHLORHEXIDINE GLUCONATE 1 APPLICATION(S): 213 SOLUTION TOPICAL at 11:00

## 2020-01-01 RX ADMIN — Medication 1 MILLIGRAM(S): at 12:59

## 2020-01-01 RX ADMIN — NYSTATIN CREAM 1 APPLICATION(S): 100000 CREAM TOPICAL at 05:54

## 2020-01-01 RX ADMIN — DEXMEDETOMIDINE HYDROCHLORIDE IN 0.9% SODIUM CHLORIDE 21.88 MICROGRAM(S)/KG/HR: 4 INJECTION INTRAVENOUS at 20:16

## 2020-01-01 RX ADMIN — NYSTATIN CREAM 1 APPLICATION(S): 100000 CREAM TOPICAL at 23:03

## 2020-01-01 RX ADMIN — Medication 105 MILLIGRAM(S): at 18:10

## 2020-01-01 RX ADMIN — Medication 250 MILLIGRAM(S): at 05:01

## 2020-01-01 RX ADMIN — MIDODRINE HYDROCHLORIDE 5 MILLIGRAM(S): 2.5 TABLET ORAL at 21:11

## 2020-01-01 RX ADMIN — Medication 81 MILLIGRAM(S): at 11:01

## 2020-01-01 RX ADMIN — Medication 25 MILLIGRAM(S): at 17:39

## 2020-01-01 RX ADMIN — NYSTATIN CREAM 1 APPLICATION(S): 100000 CREAM TOPICAL at 22:42

## 2020-01-01 RX ADMIN — CEFEPIME 100 MILLIGRAM(S): 1 INJECTION, POWDER, FOR SOLUTION INTRAMUSCULAR; INTRAVENOUS at 20:42

## 2020-01-01 RX ADMIN — Medication 5 MILLIGRAM(S): at 05:03

## 2020-01-01 RX ADMIN — Medication 81 MILLIGRAM(S): at 12:59

## 2020-01-01 RX ADMIN — HEPARIN SODIUM 5000 UNIT(S): 5000 INJECTION INTRAVENOUS; SUBCUTANEOUS at 17:08

## 2020-01-01 RX ADMIN — CALCITONIN SALMON 500 INTERNATIONAL UNIT(S): 200 INJECTION, SOLUTION INTRAMUSCULAR at 05:53

## 2020-01-01 RX ADMIN — DEXMEDETOMIDINE HYDROCHLORIDE IN 0.9% SODIUM CHLORIDE 21.88 MICROGRAM(S)/KG/HR: 4 INJECTION INTRAVENOUS at 15:00

## 2020-01-01 RX ADMIN — DEXMEDETOMIDINE HYDROCHLORIDE IN 0.9% SODIUM CHLORIDE 21.88 MICROGRAM(S)/KG/HR: 4 INJECTION INTRAVENOUS at 04:20

## 2020-01-01 RX ADMIN — SODIUM ZIRCONIUM CYCLOSILICATE 5 GRAM(S): 10 POWDER, FOR SUSPENSION ORAL at 08:32

## 2020-01-01 RX ADMIN — NYSTATIN CREAM 1 APPLICATION(S): 100000 CREAM TOPICAL at 14:31

## 2020-01-01 RX ADMIN — DEXMEDETOMIDINE HYDROCHLORIDE IN 0.9% SODIUM CHLORIDE 21.88 MICROGRAM(S)/KG/HR: 4 INJECTION INTRAVENOUS at 05:36

## 2020-01-01 RX ADMIN — Medication 81 MILLIGRAM(S): at 11:23

## 2020-01-01 RX ADMIN — DEXMEDETOMIDINE HYDROCHLORIDE IN 0.9% SODIUM CHLORIDE 21.88 MICROGRAM(S)/KG/HR: 4 INJECTION INTRAVENOUS at 16:00

## 2020-01-01 RX ADMIN — PANTOPRAZOLE SODIUM 40 MILLIGRAM(S): 20 TABLET, DELAYED RELEASE ORAL at 12:59

## 2020-01-01 RX ADMIN — ATORVASTATIN CALCIUM 40 MILLIGRAM(S): 80 TABLET, FILM COATED ORAL at 21:14

## 2020-01-01 RX ADMIN — PROPOFOL 4.08 MICROGRAM(S)/KG/MIN: 10 INJECTION, EMULSION INTRAVENOUS at 22:02

## 2020-01-01 RX ADMIN — Medication 1 MILLIGRAM(S): at 11:28

## 2020-01-01 RX ADMIN — DEXMEDETOMIDINE HYDROCHLORIDE IN 0.9% SODIUM CHLORIDE 25 MICROGRAM(S)/KG/HR: 4 INJECTION INTRAVENOUS at 13:37

## 2020-01-01 RX ADMIN — QUETIAPINE FUMARATE 25 MILLIGRAM(S): 200 TABLET, FILM COATED ORAL at 17:02

## 2020-01-01 RX ADMIN — SODIUM ZIRCONIUM CYCLOSILICATE 10 GRAM(S): 10 POWDER, FOR SUSPENSION ORAL at 20:19

## 2020-01-01 RX ADMIN — MORPHINE SULFATE 4 MILLIGRAM(S): 50 CAPSULE, EXTENDED RELEASE ORAL at 17:08

## 2020-01-01 RX ADMIN — AMPICILLIN SODIUM AND SULBACTAM SODIUM 200 GRAM(S): 250; 125 INJECTION, POWDER, FOR SUSPENSION INTRAMUSCULAR; INTRAVENOUS at 18:00

## 2020-01-01 RX ADMIN — HEPARIN SODIUM 5000 UNIT(S): 5000 INJECTION INTRAVENOUS; SUBCUTANEOUS at 17:04

## 2020-01-01 RX ADMIN — Medication 15.23 MICROGRAM(S)/KG/MIN: at 11:24

## 2020-01-01 RX ADMIN — CHLORHEXIDINE GLUCONATE 1 APPLICATION(S): 213 SOLUTION TOPICAL at 12:41

## 2020-01-01 RX ADMIN — Medication 105 MILLIGRAM(S): at 13:36

## 2020-01-01 RX ADMIN — Medication 105 MILLIGRAM(S): at 12:41

## 2020-01-01 RX ADMIN — MORPHINE SULFATE 4 MILLIGRAM(S): 50 CAPSULE, EXTENDED RELEASE ORAL at 12:04

## 2020-01-01 RX ADMIN — CEFEPIME 100 MILLIGRAM(S): 1 INJECTION, POWDER, FOR SOLUTION INTRAMUSCULAR; INTRAVENOUS at 05:42

## 2020-01-01 RX ADMIN — MIDODRINE HYDROCHLORIDE 5 MILLIGRAM(S): 2.5 TABLET ORAL at 05:46

## 2020-01-01 RX ADMIN — NYSTATIN CREAM 1 APPLICATION(S): 100000 CREAM TOPICAL at 05:48

## 2020-01-01 RX ADMIN — PROPOFOL 22.5 MICROGRAM(S)/KG/MIN: 10 INJECTION, EMULSION INTRAVENOUS at 20:47

## 2020-01-01 RX ADMIN — ATORVASTATIN CALCIUM 40 MILLIGRAM(S): 80 TABLET, FILM COATED ORAL at 21:11

## 2020-01-01 RX ADMIN — Medication 2 MILLIGRAM(S): at 05:53

## 2020-01-01 RX ADMIN — DEXMEDETOMIDINE HYDROCHLORIDE IN 0.9% SODIUM CHLORIDE 21.88 MICROGRAM(S)/KG/HR: 4 INJECTION INTRAVENOUS at 08:35

## 2020-01-01 RX ADMIN — CHLORHEXIDINE GLUCONATE 15 MILLILITER(S): 213 SOLUTION TOPICAL at 06:02

## 2020-01-01 RX ADMIN — Medication 40 MILLIGRAM(S): at 05:53

## 2020-01-01 RX ADMIN — CHLORHEXIDINE GLUCONATE 15 MILLILITER(S): 213 SOLUTION TOPICAL at 05:30

## 2020-01-01 RX ADMIN — Medication 130 MILLIGRAM(S): at 07:10

## 2020-01-01 RX ADMIN — SODIUM CHLORIDE 100 MILLILITER(S): 9 INJECTION, SOLUTION INTRAVENOUS at 10:40

## 2020-01-01 RX ADMIN — Medication 105 MILLIGRAM(S): at 11:31

## 2020-01-01 RX ADMIN — Medication 105 MILLIGRAM(S): at 06:04

## 2020-01-01 RX ADMIN — NYSTATIN CREAM 1 APPLICATION(S): 100000 CREAM TOPICAL at 13:15

## 2020-01-01 RX ADMIN — PROPOFOL 4.08 MICROGRAM(S)/KG/MIN: 10 INJECTION, EMULSION INTRAVENOUS at 23:19

## 2020-01-01 RX ADMIN — Medication 40 MILLIGRAM(S): at 06:24

## 2020-01-01 RX ADMIN — MIDODRINE HYDROCHLORIDE 5 MILLIGRAM(S): 2.5 TABLET ORAL at 10:36

## 2020-01-01 RX ADMIN — CHLORHEXIDINE GLUCONATE 1 APPLICATION(S): 213 SOLUTION TOPICAL at 12:59

## 2020-01-01 RX ADMIN — NYSTATIN CREAM 1 APPLICATION(S): 100000 CREAM TOPICAL at 21:34

## 2020-01-01 RX ADMIN — Medication 1 MILLIGRAM(S): at 22:59

## 2020-01-01 RX ADMIN — DEXMEDETOMIDINE HYDROCHLORIDE IN 0.9% SODIUM CHLORIDE 18.75 MICROGRAM(S)/KG/HR: 4 INJECTION INTRAVENOUS at 23:48

## 2020-01-01 RX ADMIN — DEXMEDETOMIDINE HYDROCHLORIDE IN 0.9% SODIUM CHLORIDE 25 MICROGRAM(S)/KG/HR: 4 INJECTION INTRAVENOUS at 10:30

## 2020-01-01 RX ADMIN — NYSTATIN CREAM 1 APPLICATION(S): 100000 CREAM TOPICAL at 21:46

## 2020-01-01 RX ADMIN — AMPICILLIN SODIUM AND SULBACTAM SODIUM 200 GRAM(S): 250; 125 INJECTION, POWDER, FOR SUSPENSION INTRAMUSCULAR; INTRAVENOUS at 18:10

## 2020-01-01 RX ADMIN — DEXMEDETOMIDINE HYDROCHLORIDE IN 0.9% SODIUM CHLORIDE 18.75 MICROGRAM(S)/KG/HR: 4 INJECTION INTRAVENOUS at 07:29

## 2020-01-01 RX ADMIN — CHLORHEXIDINE GLUCONATE 1 APPLICATION(S): 213 SOLUTION TOPICAL at 05:28

## 2020-01-01 RX ADMIN — AMPICILLIN SODIUM AND SULBACTAM SODIUM 200 GRAM(S): 250; 125 INJECTION, POWDER, FOR SUSPENSION INTRAMUSCULAR; INTRAVENOUS at 05:48

## 2020-01-01 RX ADMIN — CHLORHEXIDINE GLUCONATE 15 MILLILITER(S): 213 SOLUTION TOPICAL at 17:07

## 2020-01-01 RX ADMIN — NYSTATIN CREAM 1 APPLICATION(S): 100000 CREAM TOPICAL at 11:30

## 2020-01-01 RX ADMIN — Medication 1000 MILLIGRAM(S): at 10:24

## 2020-01-01 RX ADMIN — DEXMEDETOMIDINE HYDROCHLORIDE IN 0.9% SODIUM CHLORIDE 25 MICROGRAM(S)/KG/HR: 4 INJECTION INTRAVENOUS at 04:45

## 2020-01-01 RX ADMIN — AMPICILLIN SODIUM AND SULBACTAM SODIUM 200 GRAM(S): 250; 125 INJECTION, POWDER, FOR SUSPENSION INTRAMUSCULAR; INTRAVENOUS at 18:08

## 2020-01-01 RX ADMIN — CALCITONIN SALMON 400 INTERNATIONAL UNIT(S): 200 INJECTION, SOLUTION INTRAMUSCULAR at 14:43

## 2020-01-01 RX ADMIN — SODIUM CHLORIDE 75 MILLILITER(S): 9 INJECTION, SOLUTION INTRAVENOUS at 20:42

## 2020-01-01 RX ADMIN — FENTANYL CITRATE 12.5 MICROGRAM(S)/KG/HR: 50 INJECTION INTRAVENOUS at 12:45

## 2020-01-01 RX ADMIN — CHLORHEXIDINE GLUCONATE 15 MILLILITER(S): 213 SOLUTION TOPICAL at 18:10

## 2020-01-01 RX ADMIN — CHLORHEXIDINE GLUCONATE 1 APPLICATION(S): 213 SOLUTION TOPICAL at 05:55

## 2020-01-01 RX ADMIN — ATORVASTATIN CALCIUM 40 MILLIGRAM(S): 80 TABLET, FILM COATED ORAL at 21:33

## 2020-01-01 RX ADMIN — SODIUM CHLORIDE 1000 MILLILITER(S): 9 INJECTION INTRAMUSCULAR; INTRAVENOUS; SUBCUTANEOUS at 12:00

## 2020-01-01 RX ADMIN — VASOPRESSIN 2.4 UNIT(S)/MIN: 20 INJECTION INTRAVENOUS at 21:39

## 2020-01-01 RX ADMIN — Medication 650 MILLIGRAM(S): at 16:33

## 2020-01-01 RX ADMIN — NYSTATIN CREAM 1 APPLICATION(S): 100000 CREAM TOPICAL at 21:42

## 2020-01-01 RX ADMIN — Medication 105 MILLIGRAM(S): at 21:24

## 2020-01-01 RX ADMIN — SODIUM ZIRCONIUM CYCLOSILICATE 5 GRAM(S): 10 POWDER, FOR SUSPENSION ORAL at 05:49

## 2020-01-01 RX ADMIN — Medication 25 MILLIGRAM(S): at 05:54

## 2020-01-01 RX ADMIN — ATORVASTATIN CALCIUM 40 MILLIGRAM(S): 80 TABLET, FILM COATED ORAL at 21:24

## 2020-01-01 RX ADMIN — SODIUM CHLORIDE 3000 MILLILITER(S): 9 INJECTION INTRAMUSCULAR; INTRAVENOUS; SUBCUTANEOUS at 12:12

## 2020-01-01 RX ADMIN — Medication 2 MILLIGRAM(S): at 12:30

## 2020-01-01 RX ADMIN — PANTOPRAZOLE SODIUM 40 MILLIGRAM(S): 20 TABLET, DELAYED RELEASE ORAL at 11:22

## 2020-01-01 RX ADMIN — PHENYLEPHRINE HYDROCHLORIDE 23.44 MICROGRAM(S)/KG/MIN: 10 INJECTION INTRAVENOUS at 10:52

## 2020-01-01 RX ADMIN — POTASSIUM PHOSPHATE, MONOBASIC POTASSIUM PHOSPHATE, DIBASIC 62.5 MILLIMOLE(S): 236; 224 INJECTION, SOLUTION INTRAVENOUS at 02:06

## 2020-01-01 RX ADMIN — DEXMEDETOMIDINE HYDROCHLORIDE IN 0.9% SODIUM CHLORIDE 21.88 MICROGRAM(S)/KG/HR: 4 INJECTION INTRAVENOUS at 13:17

## 2020-01-01 RX ADMIN — NYSTATIN CREAM 1 APPLICATION(S): 100000 CREAM TOPICAL at 05:42

## 2020-01-01 RX ADMIN — PROPOFOL 4.08 MICROGRAM(S)/KG/MIN: 10 INJECTION, EMULSION INTRAVENOUS at 21:24

## 2020-01-01 RX ADMIN — Medication 25 MILLIGRAM(S): at 18:07

## 2020-01-01 RX ADMIN — DEXMEDETOMIDINE HYDROCHLORIDE IN 0.9% SODIUM CHLORIDE 21.88 MICROGRAM(S)/KG/HR: 4 INJECTION INTRAVENOUS at 11:28

## 2020-01-01 RX ADMIN — AMPICILLIN SODIUM AND SULBACTAM SODIUM 200 GRAM(S): 250; 125 INJECTION, POWDER, FOR SUSPENSION INTRAMUSCULAR; INTRAVENOUS at 06:24

## 2020-01-01 RX ADMIN — Medication 105 MILLIGRAM(S): at 05:52

## 2020-01-01 RX ADMIN — ATORVASTATIN CALCIUM 40 MILLIGRAM(S): 80 TABLET, FILM COATED ORAL at 21:40

## 2020-01-01 RX ADMIN — DEXMEDETOMIDINE HYDROCHLORIDE IN 0.9% SODIUM CHLORIDE 21.88 MICROGRAM(S)/KG/HR: 4 INJECTION INTRAVENOUS at 22:02

## 2020-01-01 RX ADMIN — DEXMEDETOMIDINE HYDROCHLORIDE IN 0.9% SODIUM CHLORIDE 21.88 MICROGRAM(S)/KG/HR: 4 INJECTION INTRAVENOUS at 20:35

## 2020-01-01 RX ADMIN — HEPARIN SODIUM 5000 UNIT(S): 5000 INJECTION INTRAVENOUS; SUBCUTANEOUS at 05:47

## 2020-01-01 RX ADMIN — AMPICILLIN SODIUM AND SULBACTAM SODIUM 200 GRAM(S): 250; 125 INJECTION, POWDER, FOR SUSPENSION INTRAMUSCULAR; INTRAVENOUS at 12:15

## 2020-01-01 RX ADMIN — NYSTATIN CREAM 1 APPLICATION(S): 100000 CREAM TOPICAL at 13:13

## 2020-01-01 RX ADMIN — AMPICILLIN SODIUM AND SULBACTAM SODIUM 200 GRAM(S): 250; 125 INJECTION, POWDER, FOR SUSPENSION INTRAMUSCULAR; INTRAVENOUS at 23:58

## 2020-01-01 RX ADMIN — Medication 5 MILLIGRAM(S): at 02:52

## 2020-01-01 RX ADMIN — Medication 2 MILLIGRAM(S): at 13:56

## 2020-01-01 RX ADMIN — Medication 1 MILLIGRAM(S): at 11:01

## 2020-01-01 RX ADMIN — Medication 40 MILLIGRAM(S): at 17:02

## 2020-01-01 RX ADMIN — Medication 105 MILLIGRAM(S): at 12:08

## 2020-01-01 RX ADMIN — PROPOFOL 20 MILLIGRAM(S): 10 INJECTION, EMULSION INTRAVENOUS at 11:00

## 2020-01-01 RX ADMIN — Medication 234.38 MICROGRAM(S)/KG/MIN: at 21:38

## 2020-01-01 RX ADMIN — AMPICILLIN SODIUM AND SULBACTAM SODIUM 200 GRAM(S): 250; 125 INJECTION, POWDER, FOR SUSPENSION INTRAMUSCULAR; INTRAVENOUS at 02:42

## 2020-01-01 RX ADMIN — Medication 1 MILLIGRAM(S): at 12:07

## 2020-01-01 RX ADMIN — Medication 1: at 23:14

## 2020-01-01 RX ADMIN — MORPHINE SULFATE 4 MILLIGRAM(S): 50 CAPSULE, EXTENDED RELEASE ORAL at 07:33

## 2020-01-01 RX ADMIN — MORPHINE SULFATE 4 MILLIGRAM(S): 50 CAPSULE, EXTENDED RELEASE ORAL at 17:13

## 2020-01-01 RX ADMIN — ETOMIDATE 20 MILLIGRAM(S): 2 INJECTION INTRAVENOUS at 11:00

## 2020-01-01 RX ADMIN — MORPHINE SULFATE 4 MILLIGRAM(S): 50 CAPSULE, EXTENDED RELEASE ORAL at 11:00

## 2020-01-01 RX ADMIN — CHLORHEXIDINE GLUCONATE 15 MILLILITER(S): 213 SOLUTION TOPICAL at 05:56

## 2020-01-01 RX ADMIN — CEFEPIME 100 MILLIGRAM(S): 1 INJECTION, POWDER, FOR SOLUTION INTRAMUSCULAR; INTRAVENOUS at 05:03

## 2020-01-01 RX ADMIN — Medication 2 MILLIGRAM(S): at 06:03

## 2020-01-01 RX ADMIN — Medication 1 TABLET(S): at 11:44

## 2020-01-01 RX ADMIN — Medication 50 MILLILITER(S): at 00:28

## 2020-01-01 RX ADMIN — DEXMEDETOMIDINE HYDROCHLORIDE IN 0.9% SODIUM CHLORIDE 21.88 MICROGRAM(S)/KG/HR: 4 INJECTION INTRAVENOUS at 11:09

## 2020-01-01 RX ADMIN — Medication 1 TABLET(S): at 13:41

## 2020-01-01 RX ADMIN — Medication 2 MILLIGRAM(S): at 22:30

## 2020-01-01 RX ADMIN — Medication 2 MILLIGRAM(S): at 10:20

## 2020-01-01 RX ADMIN — Medication 50 MILLILITER(S): at 19:27

## 2020-01-01 RX ADMIN — Medication 81 MILLIGRAM(S): at 11:28

## 2020-01-01 RX ADMIN — DEXMEDETOMIDINE HYDROCHLORIDE IN 0.9% SODIUM CHLORIDE 21.88 MICROGRAM(S)/KG/HR: 4 INJECTION INTRAVENOUS at 14:07

## 2020-01-01 RX ADMIN — CHLORHEXIDINE GLUCONATE 15 MILLILITER(S): 213 SOLUTION TOPICAL at 18:12

## 2020-01-01 RX ADMIN — DEXMEDETOMIDINE HYDROCHLORIDE IN 0.9% SODIUM CHLORIDE 25 MICROGRAM(S)/KG/HR: 4 INJECTION INTRAVENOUS at 09:25

## 2020-01-01 RX ADMIN — Medication 1 TABLET(S): at 12:04

## 2020-01-01 RX ADMIN — AMPICILLIN SODIUM AND SULBACTAM SODIUM 200 GRAM(S): 250; 125 INJECTION, POWDER, FOR SUSPENSION INTRAMUSCULAR; INTRAVENOUS at 17:35

## 2020-01-01 RX ADMIN — Medication 10 MILLIGRAM(S): at 01:17

## 2020-01-01 RX ADMIN — Medication 10 MILLIGRAM(S): at 05:42

## 2020-01-01 RX ADMIN — CHLORHEXIDINE GLUCONATE 1 APPLICATION(S): 213 SOLUTION TOPICAL at 11:01

## 2020-01-01 RX ADMIN — NYSTATIN CREAM 1 APPLICATION(S): 100000 CREAM TOPICAL at 13:56

## 2020-01-01 RX ADMIN — POTASSIUM PHOSPHATE, MONOBASIC POTASSIUM PHOSPHATE, DIBASIC 62.5 MILLIMOLE(S): 236; 224 INJECTION, SOLUTION INTRAVENOUS at 12:59

## 2020-01-01 RX ADMIN — Medication 3 MILLILITER(S): at 18:27

## 2020-01-01 RX ADMIN — Medication 2 MILLIGRAM(S): at 08:34

## 2020-01-01 RX ADMIN — Medication 5 MILLIGRAM(S): at 21:18

## 2020-01-01 RX ADMIN — SODIUM ZIRCONIUM CYCLOSILICATE 5 GRAM(S): 10 POWDER, FOR SUSPENSION ORAL at 13:03

## 2020-01-01 RX ADMIN — Medication 2 MILLIGRAM(S): at 01:43

## 2020-01-01 RX ADMIN — SODIUM CHLORIDE 1500 MILLILITER(S): 9 INJECTION, SOLUTION INTRAVENOUS at 23:58

## 2020-01-01 RX ADMIN — PROPOFOL 4.08 MICROGRAM(S)/KG/MIN: 10 INJECTION, EMULSION INTRAVENOUS at 02:25

## 2020-01-01 RX ADMIN — ATORVASTATIN CALCIUM 40 MILLIGRAM(S): 80 TABLET, FILM COATED ORAL at 23:14

## 2020-01-01 RX ADMIN — Medication 400 MILLIGRAM(S): at 21:54

## 2020-01-01 RX ADMIN — DEXMEDETOMIDINE HYDROCHLORIDE IN 0.9% SODIUM CHLORIDE 18.75 MICROGRAM(S)/KG/HR: 4 INJECTION INTRAVENOUS at 22:14

## 2020-01-01 RX ADMIN — PANTOPRAZOLE SODIUM 40 MILLIGRAM(S): 20 TABLET, DELAYED RELEASE ORAL at 11:28

## 2020-01-01 RX ADMIN — HEPARIN SODIUM 2400 UNIT(S)/HR: 5000 INJECTION INTRAVENOUS; SUBCUTANEOUS at 15:07

## 2020-01-01 RX ADMIN — QUETIAPINE FUMARATE 25 MILLIGRAM(S): 200 TABLET, FILM COATED ORAL at 05:57

## 2020-01-01 RX ADMIN — NYSTATIN CREAM 1 APPLICATION(S): 100000 CREAM TOPICAL at 06:21

## 2020-01-01 RX ADMIN — NYSTATIN CREAM 1 APPLICATION(S): 100000 CREAM TOPICAL at 21:18

## 2020-01-01 RX ADMIN — Medication 130 MILLIGRAM(S): at 13:30

## 2020-01-01 RX ADMIN — Medication 1 MILLIGRAM(S): at 11:44

## 2020-01-01 RX ADMIN — Medication 40 MILLIGRAM(S): at 10:35

## 2020-01-01 RX ADMIN — NYSTATIN CREAM 1 APPLICATION(S): 100000 CREAM TOPICAL at 13:06

## 2020-01-01 RX ADMIN — CHLORHEXIDINE GLUCONATE 15 MILLILITER(S): 213 SOLUTION TOPICAL at 17:51

## 2020-01-01 RX ADMIN — NYSTATIN CREAM 1 APPLICATION(S): 100000 CREAM TOPICAL at 05:31

## 2020-01-01 RX ADMIN — NYSTATIN CREAM 1 APPLICATION(S): 100000 CREAM TOPICAL at 14:00

## 2020-01-01 RX ADMIN — Medication 2 MILLIGRAM(S): at 20:19

## 2020-01-01 RX ADMIN — SODIUM CHLORIDE 1000 MILLILITER(S): 9 INJECTION INTRAMUSCULAR; INTRAVENOUS; SUBCUTANEOUS at 21:45

## 2020-01-01 RX ADMIN — INSULIN HUMAN 5 UNIT(S): 100 INJECTION, SOLUTION SUBCUTANEOUS at 00:28

## 2020-01-01 RX ADMIN — CALCITONIN SALMON 500 INTERNATIONAL UNIT(S): 200 INJECTION, SOLUTION INTRAMUSCULAR at 21:14

## 2020-01-01 RX ADMIN — Medication 650 MILLIGRAM(S): at 13:46

## 2020-01-01 RX ADMIN — QUETIAPINE FUMARATE 25 MILLIGRAM(S): 200 TABLET, FILM COATED ORAL at 17:25

## 2020-01-01 RX ADMIN — NYSTATIN CREAM 1 APPLICATION(S): 100000 CREAM TOPICAL at 05:29

## 2020-01-01 RX ADMIN — Medication 400 MILLIGRAM(S): at 09:01

## 2020-01-01 RX ADMIN — MORPHINE SULFATE 2 MILLIGRAM(S): 50 CAPSULE, EXTENDED RELEASE ORAL at 15:39

## 2020-01-01 RX ADMIN — LACTULOSE 10 GRAM(S): 10 SOLUTION ORAL at 09:15

## 2020-01-01 RX ADMIN — MORPHINE SULFATE 4 MILLIGRAM(S): 50 CAPSULE, EXTENDED RELEASE ORAL at 04:40

## 2020-01-01 RX ADMIN — Medication 1 TABLET(S): at 12:16

## 2020-01-01 RX ADMIN — MORPHINE SULFATE 4 MILLIGRAM(S): 50 CAPSULE, EXTENDED RELEASE ORAL at 08:06

## 2020-01-01 RX ADMIN — PROPOFOL 22.5 MICROGRAM(S)/KG/MIN: 10 INJECTION, EMULSION INTRAVENOUS at 06:26

## 2020-01-01 RX ADMIN — NYSTATIN CREAM 1 APPLICATION(S): 100000 CREAM TOPICAL at 21:14

## 2020-01-01 RX ADMIN — DEXMEDETOMIDINE HYDROCHLORIDE IN 0.9% SODIUM CHLORIDE 21.88 MICROGRAM(S)/KG/HR: 4 INJECTION INTRAVENOUS at 09:49

## 2020-01-01 RX ADMIN — Medication 400 MILLIGRAM(S): at 21:50

## 2020-01-01 RX ADMIN — FENTANYL CITRATE 25 MICROGRAM(S): 50 INJECTION INTRAVENOUS at 15:29

## 2020-01-01 RX ADMIN — PHENYLEPHRINE HYDROCHLORIDE 23.44 MICROGRAM(S)/KG/MIN: 10 INJECTION INTRAVENOUS at 17:00

## 2020-01-01 RX ADMIN — Medication 40 MILLIGRAM(S): at 05:47

## 2020-01-01 RX ADMIN — MORPHINE SULFATE 4 MILLIGRAM(S): 50 CAPSULE, EXTENDED RELEASE ORAL at 08:03

## 2020-01-01 RX ADMIN — Medication 2 MILLIGRAM(S): at 02:19

## 2020-01-01 RX ADMIN — PROPOFOL 4.08 MICROGRAM(S)/KG/MIN: 10 INJECTION, EMULSION INTRAVENOUS at 06:25

## 2020-01-01 RX ADMIN — Medication 650 MILLIGRAM(S): at 13:06

## 2020-01-01 RX ADMIN — HEPARIN SODIUM 2400 UNIT(S)/HR: 5000 INJECTION INTRAVENOUS; SUBCUTANEOUS at 09:01

## 2020-01-01 RX ADMIN — HEPARIN SODIUM 5000 UNIT(S): 5000 INJECTION INTRAVENOUS; SUBCUTANEOUS at 05:56

## 2020-01-01 RX ADMIN — CHLORHEXIDINE GLUCONATE 15 MILLILITER(S): 213 SOLUTION TOPICAL at 05:14

## 2020-01-01 RX ADMIN — PROPOFOL 22.5 MICROGRAM(S)/KG/MIN: 10 INJECTION, EMULSION INTRAVENOUS at 03:06

## 2020-01-01 RX ADMIN — Medication 50 MILLILITER(S): at 22:38

## 2020-01-01 RX ADMIN — CHLORHEXIDINE GLUCONATE 15 MILLILITER(S): 213 SOLUTION TOPICAL at 17:06

## 2020-01-01 RX ADMIN — DEXMEDETOMIDINE HYDROCHLORIDE IN 0.9% SODIUM CHLORIDE 21.88 MICROGRAM(S)/KG/HR: 4 INJECTION INTRAVENOUS at 18:30

## 2020-01-01 RX ADMIN — Medication 250 MILLIGRAM(S): at 21:24

## 2020-01-01 RX ADMIN — PROPOFOL 4.08 MICROGRAM(S)/KG/MIN: 10 INJECTION, EMULSION INTRAVENOUS at 18:11

## 2020-01-01 RX ADMIN — MORPHINE SULFATE 4 MILLIGRAM(S): 50 CAPSULE, EXTENDED RELEASE ORAL at 05:15

## 2020-01-01 RX ADMIN — Medication 15.23 MICROGRAM(S)/KG/MIN: at 13:13

## 2020-01-01 RX ADMIN — NYSTATIN CREAM 1 APPLICATION(S): 100000 CREAM TOPICAL at 21:40

## 2020-01-01 RX ADMIN — Medication 105 MILLIGRAM(S): at 14:00

## 2020-01-01 RX ADMIN — Medication 2 MILLIGRAM(S): at 20:17

## 2020-01-01 RX ADMIN — ZOLEDRONIC ACID 400 MILLIGRAM(S): 5 INJECTION, SOLUTION INTRAVENOUS at 14:43

## 2020-01-01 RX ADMIN — DEXMEDETOMIDINE HYDROCHLORIDE IN 0.9% SODIUM CHLORIDE 6.25 MICROGRAM(S)/KG/HR: 4 INJECTION INTRAVENOUS at 03:07

## 2020-01-01 RX ADMIN — MORPHINE SULFATE 4 MILLIGRAM(S): 50 CAPSULE, EXTENDED RELEASE ORAL at 09:59

## 2020-01-01 RX ADMIN — NYSTATIN CREAM 1 APPLICATION(S): 100000 CREAM TOPICAL at 05:02

## 2020-01-01 RX ADMIN — Medication 81 MILLIGRAM(S): at 13:06

## 2020-01-01 RX ADMIN — Medication 3 MILLILITER(S): at 18:50

## 2020-01-01 RX ADMIN — Medication 10 MILLIGRAM(S): at 18:58

## 2020-01-01 RX ADMIN — Medication 25 MILLIGRAM(S): at 06:03

## 2020-01-01 RX ADMIN — Medication 105 MILLIGRAM(S): at 23:23

## 2020-01-01 RX ADMIN — CALCITONIN SALMON 400 INTERNATIONAL UNIT(S): 200 INJECTION, SOLUTION INTRAMUSCULAR at 18:33

## 2020-01-01 RX ADMIN — PANTOPRAZOLE SODIUM 40 MILLIGRAM(S): 20 TABLET, DELAYED RELEASE ORAL at 13:43

## 2020-01-01 RX ADMIN — CHLORHEXIDINE GLUCONATE 15 MILLILITER(S): 213 SOLUTION TOPICAL at 06:24

## 2020-01-01 RX ADMIN — MIDODRINE HYDROCHLORIDE 5 MILLIGRAM(S): 2.5 TABLET ORAL at 13:03

## 2020-01-01 RX ADMIN — LACTULOSE 10 GRAM(S): 10 SOLUTION ORAL at 05:40

## 2020-01-01 RX ADMIN — Medication 2 MILLIGRAM(S): at 21:59

## 2020-01-01 RX ADMIN — Medication 81 MILLIGRAM(S): at 11:44

## 2020-01-01 RX ADMIN — Medication 1 MILLIGRAM(S): at 11:23

## 2020-01-01 RX ADMIN — PIPERACILLIN AND TAZOBACTAM 25 GRAM(S): 4; .5 INJECTION, POWDER, LYOPHILIZED, FOR SOLUTION INTRAVENOUS at 06:19

## 2020-01-01 RX ADMIN — CHLORHEXIDINE GLUCONATE 1 APPLICATION(S): 213 SOLUTION TOPICAL at 11:23

## 2020-01-01 RX ADMIN — FENTANYL CITRATE 25 MICROGRAM(S): 50 INJECTION INTRAVENOUS at 16:26

## 2020-01-01 RX ADMIN — Medication 25 MILLIGRAM(S): at 06:20

## 2020-01-01 RX ADMIN — Medication 1: at 05:36

## 2020-01-01 RX ADMIN — QUETIAPINE FUMARATE 25 MILLIGRAM(S): 200 TABLET, FILM COATED ORAL at 05:47

## 2020-01-01 RX ADMIN — PANTOPRAZOLE SODIUM 40 MILLIGRAM(S): 20 TABLET, DELAYED RELEASE ORAL at 12:07

## 2020-01-01 RX ADMIN — SODIUM CHLORIDE 2000 MILLILITER(S): 9 INJECTION INTRAMUSCULAR; INTRAVENOUS; SUBCUTANEOUS at 07:00

## 2020-01-01 RX ADMIN — Medication 2 MILLIGRAM(S): at 11:40

## 2020-01-01 RX ADMIN — DEXMEDETOMIDINE HYDROCHLORIDE IN 0.9% SODIUM CHLORIDE 6.25 MICROGRAM(S)/KG/HR: 4 INJECTION INTRAVENOUS at 12:46

## 2020-01-01 RX ADMIN — Medication 2 MILLIGRAM(S): at 17:42

## 2020-01-01 RX ADMIN — DEXMEDETOMIDINE HYDROCHLORIDE IN 0.9% SODIUM CHLORIDE 25 MICROGRAM(S)/KG/HR: 4 INJECTION INTRAVENOUS at 05:28

## 2020-01-01 RX ADMIN — Medication 81 MILLIGRAM(S): at 12:15

## 2020-01-01 RX ADMIN — HEPARIN SODIUM 5000 UNIT(S): 5000 INJECTION INTRAVENOUS; SUBCUTANEOUS at 05:32

## 2020-01-01 RX ADMIN — ATORVASTATIN CALCIUM 40 MILLIGRAM(S): 80 TABLET, FILM COATED ORAL at 21:47

## 2020-01-01 RX ADMIN — Medication 1 MILLIGRAM(S): at 12:04

## 2020-01-01 RX ADMIN — DEXMEDETOMIDINE HYDROCHLORIDE IN 0.9% SODIUM CHLORIDE 25 MICROGRAM(S)/KG/HR: 4 INJECTION INTRAVENOUS at 17:20

## 2020-01-01 RX ADMIN — CHLORHEXIDINE GLUCONATE 1 APPLICATION(S): 213 SOLUTION TOPICAL at 11:44

## 2020-01-01 RX ADMIN — Medication 1 TABLET(S): at 11:01

## 2020-01-01 RX ADMIN — Medication 1: at 11:00

## 2020-01-01 RX ADMIN — CHLORHEXIDINE GLUCONATE 15 MILLILITER(S): 213 SOLUTION TOPICAL at 05:02

## 2020-01-01 RX ADMIN — Medication 650 MILLIGRAM(S): at 17:45

## 2020-01-01 RX ADMIN — Medication 81 MILLIGRAM(S): at 13:41

## 2020-01-01 RX ADMIN — Medication 5 MILLIGRAM(S): at 00:33

## 2020-01-01 RX ADMIN — DEXMEDETOMIDINE HYDROCHLORIDE IN 0.9% SODIUM CHLORIDE 18.75 MICROGRAM(S)/KG/HR: 4 INJECTION INTRAVENOUS at 02:25

## 2020-01-01 RX ADMIN — Medication 3 MILLILITER(S): at 18:10

## 2020-01-01 RX ADMIN — PIPERACILLIN AND TAZOBACTAM 25 GRAM(S): 4; .5 INJECTION, POWDER, LYOPHILIZED, FOR SOLUTION INTRAVENOUS at 13:16

## 2020-01-01 RX ADMIN — HEPARIN SODIUM 5000 UNIT(S): 5000 INJECTION INTRAVENOUS; SUBCUTANEOUS at 17:51

## 2020-01-01 RX ADMIN — Medication 1: at 00:57

## 2020-01-01 RX ADMIN — Medication 40 MILLIGRAM(S): at 06:20

## 2020-01-01 RX ADMIN — DABIGATRAN ETEXILATE MESYLATE 150 MILLIGRAM(S): 150 CAPSULE ORAL at 17:39

## 2020-01-01 RX ADMIN — Medication 2.5 MILLIGRAM(S): at 15:48

## 2020-01-01 RX ADMIN — ATORVASTATIN CALCIUM 40 MILLIGRAM(S): 80 TABLET, FILM COATED ORAL at 23:02

## 2020-01-01 RX ADMIN — Medication 105 MILLIGRAM(S): at 06:24

## 2020-01-01 RX ADMIN — Medication 1: at 11:29

## 2020-01-01 RX ADMIN — DEXMEDETOMIDINE HYDROCHLORIDE IN 0.9% SODIUM CHLORIDE 21.88 MICROGRAM(S)/KG/HR: 4 INJECTION INTRAVENOUS at 16:32

## 2020-01-01 RX ADMIN — CALCITONIN SALMON 400 INTERNATIONAL UNIT(S): 200 INJECTION, SOLUTION INTRAMUSCULAR at 18:58

## 2020-01-01 RX ADMIN — PHENYLEPHRINE HYDROCHLORIDE 23.44 MICROGRAM(S)/KG/MIN: 10 INJECTION INTRAVENOUS at 06:46

## 2020-01-01 RX ADMIN — Medication 130 MILLIGRAM(S): at 17:17

## 2020-01-01 RX ADMIN — Medication 250 MILLIGRAM(S): at 21:40

## 2020-01-01 RX ADMIN — DEXMEDETOMIDINE HYDROCHLORIDE IN 0.9% SODIUM CHLORIDE 25 MICROGRAM(S)/KG/HR: 4 INJECTION INTRAVENOUS at 12:33

## 2020-01-01 RX ADMIN — DABIGATRAN ETEXILATE MESYLATE 150 MILLIGRAM(S): 150 CAPSULE ORAL at 05:53

## 2020-01-01 RX ADMIN — DEXMEDETOMIDINE HYDROCHLORIDE IN 0.9% SODIUM CHLORIDE 21.88 MICROGRAM(S)/KG/HR: 4 INJECTION INTRAVENOUS at 08:20

## 2020-01-01 RX ADMIN — AMPICILLIN SODIUM AND SULBACTAM SODIUM 200 GRAM(S): 250; 125 INJECTION, POWDER, FOR SUSPENSION INTRAMUSCULAR; INTRAVENOUS at 06:19

## 2020-01-01 RX ADMIN — DEXMEDETOMIDINE HYDROCHLORIDE IN 0.9% SODIUM CHLORIDE 18.75 MICROGRAM(S)/KG/HR: 4 INJECTION INTRAVENOUS at 03:38

## 2020-01-01 RX ADMIN — CHLORHEXIDINE GLUCONATE 1 APPLICATION(S): 213 SOLUTION TOPICAL at 11:28

## 2020-01-01 RX ADMIN — ATORVASTATIN CALCIUM 40 MILLIGRAM(S): 80 TABLET, FILM COATED ORAL at 21:42

## 2020-01-01 RX ADMIN — DEXMEDETOMIDINE HYDROCHLORIDE IN 0.9% SODIUM CHLORIDE 21.88 MICROGRAM(S)/KG/HR: 4 INJECTION INTRAVENOUS at 01:34

## 2020-01-01 RX ADMIN — CHLORHEXIDINE GLUCONATE 15 MILLILITER(S): 213 SOLUTION TOPICAL at 06:19

## 2020-01-01 RX ADMIN — Medication 1 MILLIGRAM(S): at 13:41

## 2020-01-01 RX ADMIN — NYSTATIN CREAM 1 APPLICATION(S): 100000 CREAM TOPICAL at 21:45

## 2020-01-01 RX ADMIN — Medication 250 MILLIGRAM(S): at 10:55

## 2020-01-01 RX ADMIN — PROPOFOL 4.08 MICROGRAM(S)/KG/MIN: 10 INJECTION, EMULSION INTRAVENOUS at 17:14

## 2020-01-01 RX ADMIN — NYSTATIN CREAM 1 APPLICATION(S): 100000 CREAM TOPICAL at 13:03

## 2020-01-01 RX ADMIN — AMPICILLIN SODIUM AND SULBACTAM SODIUM 200 GRAM(S): 250; 125 INJECTION, POWDER, FOR SUSPENSION INTRAMUSCULAR; INTRAVENOUS at 23:17

## 2020-01-01 RX ADMIN — DEXMEDETOMIDINE HYDROCHLORIDE IN 0.9% SODIUM CHLORIDE 18.75 MICROGRAM(S)/KG/HR: 4 INJECTION INTRAVENOUS at 06:47

## 2020-01-01 RX ADMIN — NYSTATIN CREAM 1 APPLICATION(S): 100000 CREAM TOPICAL at 23:13

## 2020-01-01 RX ADMIN — DEXMEDETOMIDINE HYDROCHLORIDE IN 0.9% SODIUM CHLORIDE 21.88 MICROGRAM(S)/KG/HR: 4 INJECTION INTRAVENOUS at 18:45

## 2020-01-01 RX ADMIN — NYSTATIN CREAM 1 APPLICATION(S): 100000 CREAM TOPICAL at 21:27

## 2020-01-01 RX ADMIN — Medication 1 TABLET(S): at 12:59

## 2020-01-01 RX ADMIN — DEXMEDETOMIDINE HYDROCHLORIDE IN 0.9% SODIUM CHLORIDE 21.88 MICROGRAM(S)/KG/HR: 4 INJECTION INTRAVENOUS at 22:48

## 2020-01-01 RX ADMIN — MORPHINE SULFATE 4 MILLIGRAM(S): 50 CAPSULE, EXTENDED RELEASE ORAL at 07:36

## 2020-01-01 RX ADMIN — PANTOPRAZOLE SODIUM 40 MILLIGRAM(S): 20 TABLET, DELAYED RELEASE ORAL at 11:44

## 2020-01-01 RX ADMIN — AMPICILLIN SODIUM AND SULBACTAM SODIUM 200 GRAM(S): 250; 125 INJECTION, POWDER, FOR SUSPENSION INTRAMUSCULAR; INTRAVENOUS at 13:05

## 2020-01-01 RX ADMIN — CALCITONIN SALMON 400 INTERNATIONAL UNIT(S): 200 INJECTION, SOLUTION INTRAMUSCULAR at 05:01

## 2020-01-01 RX ADMIN — ATORVASTATIN CALCIUM 40 MILLIGRAM(S): 80 TABLET, FILM COATED ORAL at 22:34

## 2020-01-01 RX ADMIN — QUETIAPINE FUMARATE 25 MILLIGRAM(S): 200 TABLET, FILM COATED ORAL at 05:14

## 2020-01-01 RX ADMIN — PHENYLEPHRINE HYDROCHLORIDE 23.44 MICROGRAM(S)/KG/MIN: 10 INJECTION INTRAVENOUS at 16:23

## 2020-01-01 RX ADMIN — NYSTATIN CREAM 1 APPLICATION(S): 100000 CREAM TOPICAL at 13:52

## 2020-01-01 RX ADMIN — Medication 5 MILLIGRAM(S): at 05:42

## 2020-01-01 RX ADMIN — PIPERACILLIN AND TAZOBACTAM 25 GRAM(S): 4; .5 INJECTION, POWDER, LYOPHILIZED, FOR SOLUTION INTRAVENOUS at 05:31

## 2020-01-01 RX ADMIN — PANTOPRAZOLE SODIUM 40 MILLIGRAM(S): 20 TABLET, DELAYED RELEASE ORAL at 12:25

## 2020-01-01 RX ADMIN — HEPARIN SODIUM 5000 UNIT(S): 5000 INJECTION INTRAVENOUS; SUBCUTANEOUS at 05:14

## 2020-01-01 RX ADMIN — Medication 1000 MILLIGRAM(S): at 22:15

## 2020-01-01 RX ADMIN — Medication 1 MILLIGRAM(S): at 12:16

## 2020-01-01 RX ADMIN — CHLORHEXIDINE GLUCONATE 15 MILLILITER(S): 213 SOLUTION TOPICAL at 18:09

## 2020-01-01 RX ADMIN — MIDODRINE HYDROCHLORIDE 5 MILLIGRAM(S): 2.5 TABLET ORAL at 05:03

## 2020-01-01 RX ADMIN — CHLORHEXIDINE GLUCONATE 1 APPLICATION(S): 213 SOLUTION TOPICAL at 12:07

## 2020-01-01 RX ADMIN — PIPERACILLIN AND TAZOBACTAM 25 GRAM(S): 4; .5 INJECTION, POWDER, LYOPHILIZED, FOR SOLUTION INTRAVENOUS at 21:33

## 2020-01-01 RX ADMIN — Medication 2.5 MILLIGRAM(S): at 09:01

## 2020-01-01 RX ADMIN — SODIUM CHLORIDE 100 MILLILITER(S): 9 INJECTION, SOLUTION INTRAVENOUS at 10:36

## 2020-01-01 RX ADMIN — NYSTATIN CREAM 1 APPLICATION(S): 100000 CREAM TOPICAL at 13:00

## 2020-01-01 RX ADMIN — SODIUM CHLORIDE 4000 MILLILITER(S): 9 INJECTION INTRAMUSCULAR; INTRAVENOUS; SUBCUTANEOUS at 21:24

## 2020-01-01 RX ADMIN — NYSTATIN CREAM 1 APPLICATION(S): 100000 CREAM TOPICAL at 13:05

## 2020-01-01 RX ADMIN — Medication 50 MILLILITER(S): at 06:42

## 2020-01-01 RX ADMIN — LACTULOSE 20 GRAM(S): 10 SOLUTION ORAL at 15:48

## 2020-01-01 RX ADMIN — CHLORHEXIDINE GLUCONATE 1 APPLICATION(S): 213 SOLUTION TOPICAL at 05:30

## 2020-01-01 RX ADMIN — Medication 40 MILLIGRAM(S): at 05:03

## 2020-01-01 RX ADMIN — MORPHINE SULFATE 4 MILLIGRAM(S): 50 CAPSULE, EXTENDED RELEASE ORAL at 17:21

## 2020-01-01 RX ADMIN — AMPICILLIN SODIUM AND SULBACTAM SODIUM 200 GRAM(S): 250; 125 INJECTION, POWDER, FOR SUSPENSION INTRAMUSCULAR; INTRAVENOUS at 12:45

## 2020-01-01 RX ADMIN — PANTOPRAZOLE SODIUM 40 MILLIGRAM(S): 20 TABLET, DELAYED RELEASE ORAL at 12:16

## 2020-01-01 RX ADMIN — Medication 125 MILLIGRAM(S): at 18:26

## 2020-01-01 RX ADMIN — QUETIAPINE FUMARATE 25 MILLIGRAM(S): 200 TABLET, FILM COATED ORAL at 05:03

## 2020-01-01 RX ADMIN — INSULIN HUMAN 5 UNIT(S): 100 INJECTION, SOLUTION SUBCUTANEOUS at 22:38

## 2020-01-01 RX ADMIN — Medication 1 MILLIGRAM(S): at 12:25

## 2020-01-01 RX ADMIN — LACTULOSE 10 GRAM(S): 10 SOLUTION ORAL at 17:04

## 2020-01-01 RX ADMIN — NYSTATIN CREAM 1 APPLICATION(S): 100000 CREAM TOPICAL at 22:56

## 2020-01-01 RX ADMIN — DEXMEDETOMIDINE HYDROCHLORIDE IN 0.9% SODIUM CHLORIDE 18.75 MICROGRAM(S)/KG/HR: 4 INJECTION INTRAVENOUS at 20:45

## 2020-01-01 RX ADMIN — DEXMEDETOMIDINE HYDROCHLORIDE IN 0.9% SODIUM CHLORIDE 25 MICROGRAM(S)/KG/HR: 4 INJECTION INTRAVENOUS at 23:04

## 2020-01-01 RX ADMIN — CHLORHEXIDINE GLUCONATE 1 APPLICATION(S): 213 SOLUTION TOPICAL at 11:30

## 2020-01-01 RX ADMIN — DEXMEDETOMIDINE HYDROCHLORIDE IN 0.9% SODIUM CHLORIDE 21.88 MICROGRAM(S)/KG/HR: 4 INJECTION INTRAVENOUS at 12:11

## 2020-01-01 RX ADMIN — PROPOFOL 4.08 MICROGRAM(S)/KG/MIN: 10 INJECTION, EMULSION INTRAVENOUS at 13:47

## 2020-01-01 RX ADMIN — Medication 2 MILLIGRAM(S): at 12:02

## 2020-01-01 RX ADMIN — DEXMEDETOMIDINE HYDROCHLORIDE IN 0.9% SODIUM CHLORIDE 21.88 MICROGRAM(S)/KG/HR: 4 INJECTION INTRAVENOUS at 03:30

## 2020-01-01 RX ADMIN — Medication 1000 MILLIGRAM(S): at 03:29

## 2020-01-01 RX ADMIN — CHLORHEXIDINE GLUCONATE 1 APPLICATION(S): 213 SOLUTION TOPICAL at 12:16

## 2020-01-01 RX ADMIN — SODIUM ZIRCONIUM CYCLOSILICATE 10 GRAM(S): 10 POWDER, FOR SUSPENSION ORAL at 06:07

## 2020-01-01 RX ADMIN — Medication 1000 MILLIGRAM(S): at 22:56

## 2020-01-01 RX ADMIN — HEPARIN SODIUM 2100 UNIT(S)/HR: 5000 INJECTION INTRAVENOUS; SUBCUTANEOUS at 22:01

## 2020-01-01 RX ADMIN — PIPERACILLIN AND TAZOBACTAM 25 GRAM(S): 4; .5 INJECTION, POWDER, LYOPHILIZED, FOR SOLUTION INTRAVENOUS at 13:52

## 2020-01-01 RX ADMIN — NYSTATIN CREAM 1 APPLICATION(S): 100000 CREAM TOPICAL at 05:21

## 2020-01-01 RX ADMIN — MORPHINE SULFATE 4 MILLIGRAM(S): 50 CAPSULE, EXTENDED RELEASE ORAL at 11:30

## 2020-01-01 RX ADMIN — PIPERACILLIN AND TAZOBACTAM 25 GRAM(S): 4; .5 INJECTION, POWDER, LYOPHILIZED, FOR SOLUTION INTRAVENOUS at 21:40

## 2020-01-01 RX ADMIN — QUETIAPINE FUMARATE 25 MILLIGRAM(S): 200 TABLET, FILM COATED ORAL at 17:51

## 2020-01-01 RX ADMIN — HEPARIN SODIUM 1500 UNIT(S)/HR: 5000 INJECTION INTRAVENOUS; SUBCUTANEOUS at 12:35

## 2020-01-01 RX ADMIN — CEFEPIME 100 MILLIGRAM(S): 1 INJECTION, POWDER, FOR SOLUTION INTRAMUSCULAR; INTRAVENOUS at 15:33

## 2020-01-01 RX ADMIN — PANTOPRAZOLE SODIUM 40 MILLIGRAM(S): 20 TABLET, DELAYED RELEASE ORAL at 11:00

## 2020-01-01 RX ADMIN — Medication 100 GRAM(S): at 06:46

## 2020-01-01 RX ADMIN — CHLORHEXIDINE GLUCONATE 15 MILLILITER(S): 213 SOLUTION TOPICAL at 05:47

## 2020-01-01 RX ADMIN — Medication 40 MILLIGRAM(S): at 00:33

## 2020-01-01 RX ADMIN — Medication 1: at 23:11

## 2020-01-01 RX ADMIN — CHLORHEXIDINE GLUCONATE 15 MILLILITER(S): 213 SOLUTION TOPICAL at 05:48

## 2020-01-01 RX ADMIN — Medication 105 MILLIGRAM(S): at 16:33

## 2020-01-01 RX ADMIN — MORPHINE SULFATE 4 MILLIGRAM(S): 50 CAPSULE, EXTENDED RELEASE ORAL at 11:34

## 2020-01-01 RX ADMIN — INSULIN HUMAN 5 UNIT(S): 100 INJECTION, SOLUTION SUBCUTANEOUS at 06:43

## 2020-01-01 RX ADMIN — Medication 40 MILLIGRAM(S): at 05:22

## 2020-01-01 RX ADMIN — NYSTATIN CREAM 1 APPLICATION(S): 100000 CREAM TOPICAL at 05:55

## 2020-01-01 RX ADMIN — CHLORHEXIDINE GLUCONATE 15 MILLILITER(S): 213 SOLUTION TOPICAL at 17:02

## 2020-01-01 RX ADMIN — DEXMEDETOMIDINE HYDROCHLORIDE IN 0.9% SODIUM CHLORIDE 6.25 MICROGRAM(S)/KG/HR: 4 INJECTION INTRAVENOUS at 15:36

## 2020-01-01 RX ADMIN — Medication 15.23 MICROGRAM(S)/KG/MIN: at 08:27

## 2020-01-01 RX ADMIN — HEPARIN SODIUM 5000 UNIT(S): 5000 INJECTION INTRAVENOUS; SUBCUTANEOUS at 17:21

## 2020-01-01 RX ADMIN — DEXMEDETOMIDINE HYDROCHLORIDE IN 0.9% SODIUM CHLORIDE 25 MICROGRAM(S)/KG/HR: 4 INJECTION INTRAVENOUS at 02:13

## 2020-01-01 RX ADMIN — PROPOFOL 4.08 MICROGRAM(S)/KG/MIN: 10 INJECTION, EMULSION INTRAVENOUS at 02:44

## 2020-01-01 RX ADMIN — PANTOPRAZOLE SODIUM 40 MILLIGRAM(S): 20 TABLET, DELAYED RELEASE ORAL at 05:47

## 2020-01-01 RX ADMIN — DEXMEDETOMIDINE HYDROCHLORIDE IN 0.9% SODIUM CHLORIDE 18.75 MICROGRAM(S)/KG/HR: 4 INJECTION INTRAVENOUS at 13:13

## 2020-01-01 RX ADMIN — ATORVASTATIN CALCIUM 40 MILLIGRAM(S): 80 TABLET, FILM COATED ORAL at 22:56

## 2020-01-01 RX ADMIN — Medication 15.23 MICROGRAM(S)/KG/MIN: at 14:00

## 2020-01-01 RX ADMIN — CHLORHEXIDINE GLUCONATE 15 MILLILITER(S): 213 SOLUTION TOPICAL at 05:31

## 2020-01-01 RX ADMIN — Medication 81 MILLIGRAM(S): at 12:04

## 2020-01-01 RX ADMIN — DEXMEDETOMIDINE HYDROCHLORIDE IN 0.9% SODIUM CHLORIDE 25 MICROGRAM(S)/KG/HR: 4 INJECTION INTRAVENOUS at 21:23

## 2020-01-01 RX ADMIN — HEPARIN SODIUM 1800 UNIT(S)/HR: 5000 INJECTION INTRAVENOUS; SUBCUTANEOUS at 05:08

## 2020-01-01 RX ADMIN — Medication 130 MILLIGRAM(S): at 02:06

## 2020-01-01 RX ADMIN — Medication 1 TABLET(S): at 12:28

## 2020-01-01 RX ADMIN — DEXMEDETOMIDINE HYDROCHLORIDE IN 0.9% SODIUM CHLORIDE 25 MICROGRAM(S)/KG/HR: 4 INJECTION INTRAVENOUS at 02:00

## 2020-01-01 RX ADMIN — Medication 400 MILLIGRAM(S): at 02:58

## 2020-01-01 RX ADMIN — Medication 2 MILLIGRAM(S): at 21:26

## 2020-01-01 RX ADMIN — PANTOPRAZOLE SODIUM 40 MILLIGRAM(S): 20 TABLET, DELAYED RELEASE ORAL at 18:58

## 2020-01-01 RX ADMIN — Medication 1 TABLET(S): at 11:02

## 2020-01-01 RX ADMIN — QUETIAPINE FUMARATE 25 MILLIGRAM(S): 200 TABLET, FILM COATED ORAL at 10:38

## 2020-01-01 RX ADMIN — NYSTATIN CREAM 1 APPLICATION(S): 100000 CREAM TOPICAL at 05:57

## 2020-01-01 RX ADMIN — AMPICILLIN SODIUM AND SULBACTAM SODIUM 200 GRAM(S): 250; 125 INJECTION, POWDER, FOR SUSPENSION INTRAMUSCULAR; INTRAVENOUS at 00:17

## 2020-01-01 RX ADMIN — PIPERACILLIN AND TAZOBACTAM 200 GRAM(S): 4; .5 INJECTION, POWDER, LYOPHILIZED, FOR SOLUTION INTRAVENOUS at 00:27

## 2020-01-01 RX ADMIN — PROPOFOL 4.08 MICROGRAM(S)/KG/MIN: 10 INJECTION, EMULSION INTRAVENOUS at 10:27

## 2020-01-01 RX ADMIN — CHLORHEXIDINE GLUCONATE 15 MILLILITER(S): 213 SOLUTION TOPICAL at 17:22

## 2020-01-01 RX ADMIN — SODIUM CHLORIDE 75 MILLILITER(S): 9 INJECTION, SOLUTION INTRAVENOUS at 10:00

## 2020-01-01 RX ADMIN — Medication 25 MILLIGRAM(S): at 06:24

## 2020-01-01 RX ADMIN — Medication 1 TABLET(S): at 11:28

## 2020-01-01 RX ADMIN — CHLORHEXIDINE GLUCONATE 15 MILLILITER(S): 213 SOLUTION TOPICAL at 17:21

## 2020-01-01 RX ADMIN — DEXMEDETOMIDINE HYDROCHLORIDE IN 0.9% SODIUM CHLORIDE 18.75 MICROGRAM(S)/KG/HR: 4 INJECTION INTRAVENOUS at 15:53

## 2020-01-01 RX ADMIN — MORPHINE SULFATE 4 MILLIGRAM(S): 50 CAPSULE, EXTENDED RELEASE ORAL at 10:24

## 2020-01-01 RX ADMIN — Medication 2 MILLIGRAM(S): at 22:35

## 2020-01-01 RX ADMIN — Medication 5 MILLIGRAM(S): at 05:50

## 2020-01-01 RX ADMIN — Medication 2 MILLIGRAM(S): at 05:17

## 2020-01-03 NOTE — ED PROVIDER NOTE - CARE PLAN
Principal Discharge DX:	Cellulitis Principal Discharge DX:	COPD exacerbation  Secondary Diagnosis:	Cellulitis

## 2020-01-03 NOTE — ED PROVIDER NOTE - CLINICAL SUMMARY MEDICAL DECISION MAKING FREE TEXT BOX
68 y/o M presents to ED complaining of generalized weakness with shortness of breath and inguinal discharge. Clinically, patient has signs of bronchospasm and possible intertriginous infection of inguinal area. Will give nebulizers, steroids, and do CT to evaluation for deeper infection including [place]'s. Will anticipate admission.

## 2020-01-03 NOTE — ED PROVIDER NOTE - OBJECTIVE STATEMENT
68 y/o M with PMHx of Afib on Pradaxa presents to ED complaining of worsening shortness of breath x1w with generalized weakness. Pt states there is also slight discharge from his lower abdominal area which he has had for several years but has become more malodorous. Pt adds he last saw his PMD 1m ago and has been unable to walk due to symptoms for the past week. Pt reports he lives at home alone. Pt denies chest pain, productive cough, abdominal pain, vomiting, or any other complaints. NKDA.

## 2020-01-03 NOTE — ED ADULT NURSE NOTE - OBJECTIVE STATEMENT
Pt AOx4, ambulatory, c/o sudden SOB, dizziness and near syncopal episode today. Pt denies CP, hemoptysis. p/w discoloration to BL LE extremities.

## 2020-01-03 NOTE — ED PROVIDER NOTE - ABDOMINAL EXAM
nontender.../inguinal erythema with discharge and malodorous with no obvious fluctuance or tenderness

## 2020-01-03 NOTE — ED ADULT TRIAGE NOTE - CHIEF COMPLAINT QUOTE
biba c/o sudden onset of shortness of breath , dizziness , near syncope. pt reports increasing dyspnea on exertion for few days Pt tachypneic

## 2020-01-04 NOTE — H&P ADULT - NSHPPHYSICALEXAM_GEN_ALL_CORE
Vital Signs Last 24 Hrs  T(C): 37.2 (03 Jan 2020 15:39), Max: 37.2 (03 Jan 2020 15:39)  T(F): 98.9 (03 Jan 2020 15:39), Max: 98.9 (03 Jan 2020 15:39)  HR: 112 (03 Jan 2020 15:39) (112 - 112)  BP: 133/96 (03 Jan 2020 15:39) (133/96 - 133/96)  BP(mean): --  RR: 26 (03 Jan 2020 15:39) (26 - 26)  SpO2: 92% (03 Jan 2020 15:39) (92% - 92%)

## 2020-01-04 NOTE — CHART NOTE - NSCHARTNOTEFT_GEN_A_CORE
ECHO technician tried several time to try and perform bedside ECHO. Due to patient's inability to lay flat due to agitation the ECHO could not be performed and the test was cancelled. Patient to have ECHO done at a later date when more calm.

## 2020-01-04 NOTE — H&P ADULT - PROBLEM SELECTOR PLAN 9
IMPROVE VTE Individual Risk Assessment          RISK                                                          Points  [  ] Previous VTE                                                3  [  ] Thrombophilia                                             2  [  ] Lower limb paralysis                                   2        (unable to hold up >15 seconds)    [  ] Current Cancer                                             2         (within 6 months)  [ X ] Immobilization > 24 hrs                              1  [  ] ICU/CCU stay > 24 hours                             1  [ X ] Age > 60                                                         1    IMPROVE VTE Score: 2    pradaxa will cover dvt ppx reports taking irbesartan at home  holding in favor of lasix and lopressor  monitor vitals

## 2020-01-04 NOTE — CONSULT NOTE ADULT - PROBLEM SELECTOR RECOMMENDATION 4
Send AFP level. Follow LFT's CEA of concern but not a candidate for colon at this time, Consider CT biopsy of liver lesions. Continue diuresis. If encephalopathic start lactulose plus Xifaxan

## 2020-01-04 NOTE — CONSULT NOTE ADULT - SUBJECTIVE AND OBJECTIVE BOX
Time of visit:    CHIEF COMPLAINT: Patient is a 69y old  Male who presents with a chief complaint of     HPI:  68 y/o male with PMHx of afib (on Pradaxa), HTN and alcohol abuse presents to the ED with chief complaint of weakness and shortness of breath. Patient is AAOx2 and a vague historian. He reports having multiple falls in the past 2 weeks. He thinks he has experienced LOC sometimes as well. He endorses left lower chest pain, described as worse with movement and pleuritic in nature. He reports lower extremitiy edema over the past few months. He also complains of left lower abdominal pain. He denies nausea or vomiting. He reports currently drinking 1 bottle of wine daily with a shot of scotch. He lives alone and says he ambulates independently.     ED course:  EKG shows afib with RVR  RLE noted to be cool to touch compared to left - bedside doppler confirmed pulses of both LE. (2020 00:23)   Patient seen and examined.     PAST MEDICAL & SURGICAL HISTORY:  HTN (hypertension)  Atrial fibrillation  No significant past surgical history      Allergies    No Known Allergies    Intolerances        MEDICATIONS  (STANDING):  ampicillin/sulbactam  IVPB      ampicillin/sulbactam  IVPB 3 Gram(s) IV Intermittent every 6 hours  aspirin  chewable 81 milliGRAM(s) Oral daily  atorvastatin 40 milliGRAM(s) Oral at bedtime  dabigatran 150 milliGRAM(s) Oral every 12 hours  folic acid 1 milliGRAM(s) Oral daily  furosemide   Injectable 40 milliGRAM(s) IV Push daily  insulin lispro (HumaLOG) corrective regimen sliding scale   SubCutaneous three times a day before meals  LORazepam   Injectable 2 milliGRAM(s) IV Push every 4 hours  metoprolol tartrate 25 milliGRAM(s) Oral two times a day  multivitamin 1 Tablet(s) Oral daily  nystatin Powder 1 Application(s) Topical three times a day  thiamine IVPB 500 milliGRAM(s) IV Intermittent every 8 hours      MEDICATIONS  (PRN):  LORazepam   Injectable 2 milliGRAM(s) IV Push every 2 hours PRN Agitation   Medications up to date at time of exam.    Medications up to date at time of exam.    FAMILY HISTORY:  No pertinent family history in first degree relatives      SOCIAL HISTORY  Smoking History: [   ] smoking/smoke exposure, [  x ] former smoker  Living Condition: [   ] apartment, [   ] private house  Work History:   Travel History: denies recent travel  Illicit Substance Use: + use  Alcohol Use: denies    REVIEW OF SYSTEMS: + encephalopathy    CONSTITUTIONAL:  denies fevers, chills, sweats, weight loss    HEENT:  denies diplopia or blurred vision, sore throat or runny nose.    CARDIOVASCULAR:  denies pressure, squeezing, tightness, or heaviness about the chest; no palpitations.    RESPIRATORY:  denies SOB, cough, ACOSTA, wheezing.    GASTROINTESTINAL:  denies abdominal pain, nausea, vomiting or diarrhea.    GENITOURINARY: denies dysuria, frequency or urgency.    NEUROLOGIC:  denies numbness, tingling, seizures or weakness.    PSYCHIATRIC:  denies disorder of thought or mood.    MSK: denies swelling, redness      PHYSICAL EXAMINATION:    GENERAL: The patient is a well-developed, well-nourished, in no apparent distress.     Vital Signs Last 24 Hrs  T(C): 36.9 (2020 16:34), Max: 36.9 (2020 16:34)  T(F): 98.5 (2020 16:34), Max: 98.5 (2020 16:34)  HR: 81 (2020 16:34) (81 - 120)  BP: 127/72 (2020 16:34) (112/66 - 154/84)  BP(mean): --  RR: 18 (2020 16:34) (18 - 22)  SpO2: 94% (2020 16:34) (94% - 98%)   (if applicable)    Chest Tube (if applicable)    HEENT: Head is normocephalic and atraumatic. Extraocular muscles are intact. Mucous membranes are moist.     NECK: Supple, no palpable adenopathy.    LUNGS: Clear to auscultation, no wheezing, rales, or rhonchi.    HEART: Regular rate and rhythm without murmur.    ABDOMEN: Soft, nontender, and nondistended.  No hepatosplenomegaly is noted.    RENAL: No difficulty voiding, no pelvic pain    EXTREMITIES: Without any cyanosis, clubbing, rash, lesions or edema.    NEUROLOGIC: Awake, + encephalopathy    SKIN: Warm, dry, good turgor.      LABS:                        14.9   5.68  )-----------( 55       ( 2020 06:53 )             42.5     01-04    127<L>  |  87<L>  |  19<H>  ----------------------------<  143<H>  4.1   |  28  |  0.88    Ca    10.1      2020 06:53  Phos  2.8       Mg     1.9         TPro  6.5  /  Alb  2.7<L>  /  TBili  4.0<H>  /  DBili  x   /  AST  413<H>  /  ALT  399<H>  /  AlkPhos  243<H>      PT/INR - ( 2020 18:42 )   PT: 13.2 sec;   INR: 1.18 ratio         PTT - ( 2020 18:42 )  PTT:30.9 sec  Urinalysis Basic - ( 2020 02:05 )    Color: Yellow / Appearance: Slightly Turbid / S.015 / pH: x  Gluc: x / Ketone: Trace  / Bili: Moderate / Urobili: 8   Blood: x / Protein: 100 / Nitrite: Positive   Leuk Esterase: Trace / RBC: 0-2 /HPF / WBC 0-2 /HPF   Sq Epi: x / Non Sq Epi: Occasional /HPF / Bacteria: Few /HPF        CARDIAC MARKERS ( 2020 06:53 )  0.094 ng/mL / x     / 370 U/L / x     / 7.1 ng/mL  CARDIAC MARKERS ( 2020 00:50 )  0.118 ng/mL / x     / 436 U/L / x     / 8.2 ng/mL  CARDIAC MARKERS ( 2020 18:42 )  0.125 ng/mL / x     / x     / x     / x            Serum Pro-Brain Natriuretic Peptide: 1006 pg/mL (20 @ 18:42)    Lactate, Blood: 5.9 mmol/L (20 @ 06:53)  Lactate, Blood: 4.5 mmol/L (20 @ 00:50)  Lactate, Blood: 4.3 mmol/L (20 @ 18:42)        MICROBIOLOGY: (if applicable)    RADIOLOGY & ADDITIONAL STUDIES:  EKG:   CT abd/pelvis:< from: CT Abdomen and Pelvis w/ IV Cont (20 @ 20:04) >    EXAM:  CT ABDOMEN AND PELVIS IC                            PROCEDURE DATE:  2020          INTERPRETATION:  CT ABDOMEN AND PELVIS WITH IV CONTRAST    CLINICAL INFORMATION: lower abdominal pain, swelling, fever      COMPARISON: None.    PROCEDURE:   CT of the Abdomen and Pelvis was performed with intravenous contrast.  Intravenous contrast: 90 cc Omnipaque 350  Oral contrast: None.  Sagittal and coronal reformats were performed.    FINDINGS:    LOWER CHEST: Clear.    LIVER: Vague low-attenuation lesions throughout the liver with nodular contour.  BILE DUCTS: Nondilated.  GALLBLADDER: Normal.  SPLEEN: Normal.  PANCREAS: Normal.  ADRENALS: Mild bilateral nodularity.  KIDNEYS/URETERS: No calculi, hydronephrosis, or renal mass.    BLADDER: Normal.  REPRODUCTIVE ORGANS: Unremarkable prostate and seminal vesicles.    BOWEL: Short left mid abdominal intussusception, likely transient. No bowel obstruction. No evidence of diverticulitis or colitis.  PERITONEUM: No free air or ascites.  VESSELS:  Normal caliber aorta.  RETROPERITONEUM/LYMPH NODES: No adenopathy.    ABDOMINAL WALL: Soft tissue edema lower abdominal wall.  BONES: No aggressive lesion.    IMPRESSION:     No acute pathology. No diverticulitis or colitis.    Vague low-attenuation lesions throughout the liver with nodular contour. While findings may be related to cirrhosis, there are no ancillary findings of cirrhosis or portal hypertension. Diffuse metastatic disease to the liver could have a similar appearance. Follow-up MRI abdomen with and without contrast.    Soft tissue edema lower abdominal wall. Correlate for cellulitis.                BETTY DAVIS M.D., ATTENDING RADIOLOGIST  This document has been electronically signed. Luiz  3 2020  8:35PM                CXR:< from: Xray Chest 1 View-PORTABLE IMMEDIATE (20 @ 16:50) >    EXAM:  XR CHEST PORTABLE IMMED 1V                            PROCEDURE DATE:  2020          INTERPRETATION:  Chest radiograph (one view)     CPT 86513    CLINICAL INFORMATION:  Patient is unable to indicate. Sepsis.  Short of breath.     TECHNIQUE:  Single frontal view of the chest was obtained.    FINDINGS:  No previous examinations are available for review.    The lungs linear atelectatic changes in the right upper lobe. No pleural effusion is seen. The heart and mediastinum appear intact.      IMPRESSION: Linear atelectatic changes seen in the right upper lobe.                ARNIE ALMANZA M.D., ATTENDING RADIOLOGIST  This document has been electronically signed. Luiz  3 2020  5:34PM                < end of copied text >      ECHO:    IMPRESSION: 69y Male PAST MEDICAL & SURGICAL HISTORY:.  HTN (hypertension)  Atrial fibrillation  No significant past surgical history   p/w       IMP: This is a 69 yr old man smoker and alcohol user presented with sob with afib with RVR. There is elevated lactate probable due to sepsis.  + encephalopathy and is with enhance supervision. CXR with atelectasis. 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 Elevated LFT due to alcohol use.     Sugg:  -ivf 2-3 liters and repeat lactate  -DT precaution  -continue antibx  -f/u cultures  -CT chest  -banana bag  -advise to stop smoking and alcohol use  -fall precaution  spoke to PGY-3 on call

## 2020-01-04 NOTE — CONSULT NOTE ADULT - SUBJECTIVE AND OBJECTIVE BOX
This consult is incomplete  Patient is a 69y Male whom presented to the hospital with     HPI:  70 y/o male with PMHx of afib (on Pradaxa), HTN and alcohol abuse presents to the ED with chief complaint of weakness and shortness of breath. Patient is AAOx2 and a vague historian. He reports having multiple falls in the past 2 weeks. He thinks he has experienced LOC sometimes as well. He endorses left lower chest pain, described as worse with movement and pleuritic in nature. He reports lower extremitiy edema over the past few months. He also complains of left lower abdominal pain. He denies nausea or vomiting. He reports currently drinking 1 bottle of wine daily with a shot of scotch. He lives alone and says he ambulates independently.     ED course:  EKG shows afib with RVR  RLE noted to be cool to touch compared to left - bedside doppler confirmed pulses of both LE. (2020 00:23)    pts current chart reviewed and case discussed with resident  pt denies pmh of renal ds, proteinuria, renal stones, recurrent uti or nephrotic edema or nephrotic syndrome  pt give pmh of retinopathy and s/p laser treatment  pt denies Nsaids use or otc other nephrotoxic supplements  PAST MEDICAL & SURGICAL HISTORY:  HTN (hypertension)  Atrial fibrillation  No significant past surgical history      Home Medications: Reviewed    MEDICATIONS  (STANDING):  ampicillin/sulbactam  IVPB      ampicillin/sulbactam  IVPB 3 Gram(s) IV Intermittent every 6 hours  aspirin  chewable 81 milliGRAM(s) Oral daily  atorvastatin 40 milliGRAM(s) Oral at bedtime  dabigatran 150 milliGRAM(s) Oral every 12 hours  folic acid 1 milliGRAM(s) Oral daily  furosemide   Injectable 40 milliGRAM(s) IV Push daily  insulin lispro (HumaLOG) corrective regimen sliding scale   SubCutaneous three times a day before meals  LORazepam   Injectable 2 milliGRAM(s) IV Push every 4 hours  metoprolol tartrate 25 milliGRAM(s) Oral two times a day  multivitamin 1 Tablet(s) Oral daily  nystatin Powder 1 Application(s) Topical three times a day  thiamine IVPB 500 milliGRAM(s) IV Intermittent every 8 hours    MEDICATIONS  (PRN):  LORazepam   Injectable 2 milliGRAM(s) IV Push every 2 hours PRN Agitation      Allergies    No Known Allergies    Intolerances        SOCIAL HISTORY:  Alcohol use [X ] No  [ ] Yes  Smoking  [ X] No  [ ] Yes  Drug Abuse [X ] No  [ ] Yes  Tattoo [X ] No  [ ] Yes  History of Blood transfusion [ X] No  [ ] Yes    FAMILY HISTORY:  No pertinent family history in first degree relatives      Diabetes [ ]  Hypertension [ ]  Kidney Stones [ ]  Heart Disease [ ]  Hyperlipidemia [ ]  Cancer [ ]    REVIEW OF SYSTEMS:  CONSTITUTIONAL: No weakness, fevers or chills  EYES/ENT: No visual changes;  No vertigo or throat pain   NECK: No pain or stiffness  RESPIRATORY: No cough, wheezing, hemoptysis; No shortness of breath  CARDIOVASCULAR: No chest pain or palpitations  GASTROINTESTINAL: No abdominal or epigastric pain. No nausea, vomiting, or hematemesis; No diarrhea or constipation. No melena or hematochezia.  GENITOURINARY: No dysuria, frequency or hematuria  NEUROLOGICAL: No numbness or weakness  SKIN: No itching, burning, rashes, or lesions   All other review of systems is negative unless indicated above    Vital Signs  T(F): 97.8 (20 @ 11:33), Max: 98.2 (20 @ 03:15)  HR: 103 (20 @ 11:33) (81 - 120)  BP: 123/72 (20 @ 11:33) (112/66 - 154/84)  ABP: --  RR: 22 (20 @ 11:33) (20 - 22)  SpO2: 95% (20 @ 11:33) (94% - 98%)  Wt(kg): --  CVP(cm H2O): --  CO: --  PCWP: --    I and O's:    Daily     Daily     PHYSICAL EXAM:  Constitutional: well developed, well nourished  and in nad  HEENT: PERRLA,  no icteric sclera and mild pallor of conjunctiva noted  Neck: No JVD, thyromegaly or adenopathy  Respiratory: reduced air entry lower lungs with no rales, wheezing or rhonchi  Cardiovascular: S1 and S2 normally heard  Gastrointestinal: soft, nondistended, nontender and normal bowel sounds heard  Extremities: No peripheral edema or cyanosis  Neurological: A/O x 3, no focal deficits  Psychiatric: Normal mood, normal affect  : No flank tenderness  Skin: No rashes        LABS:                        14.9   5.68  )-----------( 55       ( 2020 06:53 )             42.5     2.8  --            127<L>  |  87<L>  |  19<H>  ----------------------------<  143<H>  4.1   |  28  |  0.88      126<L>  |  89<L>  |  16  ----------------------------<  173<H>  4.3   |  22  |  0.78  03    125<L>  |  86<L>  |  13  ----------------------------<  136<H>  4.1   |  30  |  0.76    Ca    10.1      2020 06:53  Ca    10.3      2020 00:50  Ca    10.2      2020 18:42  Phos  2.8       Mg     1.9       Mg     1.8         TPro  6.5  /  Alb  2.7<L>  /  TBili  4.0<H>  /  DBili  x   /  AST  413<H>  /  ALT  399<H>  /  AlkPhos  243<H>    TPro  6.8  /  Alb  2.8<L>  /  TBili  4.3<H>  /  DBili  x   /  AST  371<H>  /  ALT  411<H>  /  AlkPhos  246<H>            URINE STUDIES:  Urinalysis Basic - ( 2020 02:05 )    Color: Yellow / Appearance: Slightly Turbid / S.015 / pH: x  Gluc: x / Ketone: Trace  / Bili: Moderate / Urobili: 8   Blood: x / Protein: 100 / Nitrite: Positive   Leuk Esterase: Trace / RBC: 0-2 /HPF / WBC 0-2 /HPF   Sq Epi: x / Non Sq Epi: Occasional /HPF / Bacteria: Few /HPF                      RADIOLOGY & ADDITIONAL STUDIES: Patient is a 69y Male whom presented to the hospital with weakness and sob, being treated for chf/liver cirrohosis noted to have hyponatrmeia    Medical HPI:  68 y/o male with PMHx of afib (on Pradaxa), HTN and alcohol abuse presents to the ED with chief complaint of weakness and shortness of breath. Patient is AAOx2 and a vague historian. He reports having multiple falls in the past 2 weeks. He thinks he has experienced LOC sometimes as well. He endorses left lower chest pain, described as worse with movement and pleuritic in nature. He reports lower extremitiy edema over the past few months. He also complains of left lower abdominal pain. He denies nausea or vomiting. He reports currently drinking 1 bottle of wine daily with a shot of scotch. He lives alone and says he ambulates independently.     ED course:  EKG shows afib with RVR  RLE noted to be cool to touch compared to left - bedside doppler confirmed pulses of both LE. (2020 00:23)    Renal HPI:Unable to get renal hpi as pt is having ams/lethargy, s/p sedation with ativan for Echo  pts current chart reviewed and case discussed with resident  pt is being treated for chf/fluid overload   pt has B/L LE edema with mild skin discoloration noted  pt is voiding well as per RN     PAST MEDICAL & SURGICAL HISTORY:  HTN (hypertension)  Atrial fibrillation  No significant past surgical history      Home Medications: Reviewed    MEDICATIONS  (STANDING):  ampicillin/sulbactam  IVPB      ampicillin/sulbactam  IVPB 3 Gram(s) IV Intermittent every 6 hours  aspirin  chewable 81 milliGRAM(s) Oral daily  atorvastatin 40 milliGRAM(s) Oral at bedtime  dabigatran 150 milliGRAM(s) Oral every 12 hours  folic acid 1 milliGRAM(s) Oral daily  furosemide   Injectable 40 milliGRAM(s) IV Push daily  insulin lispro (HumaLOG) corrective regimen sliding scale   SubCutaneous three times a day before meals  LORazepam   Injectable 2 milliGRAM(s) IV Push every 4 hours  metoprolol tartrate 25 milliGRAM(s) Oral two times a day  multivitamin 1 Tablet(s) Oral daily  nystatin Powder 1 Application(s) Topical three times a day  thiamine IVPB 500 milliGRAM(s) IV Intermittent every 8 hours    MEDICATIONS  (PRN):  LORazepam   Injectable 2 milliGRAM(s) IV Push every 2 hours PRN Agitation      Allergies    No Known Allergies    Intolerances        SOCIAL HISTORY:  Alcohol use [ ] No  [ x] Yes  Smoking  [ ] No  [ ] Yes ,ex smoker   Drug Abuse [X ] No  [ ] Yes  Tattoo [X ] No  [ ] Yes  History of Blood transfusion [ X] No  [ ] Yes    FAMILY HISTORY:  No pertinent family history in first degree relatives        REVIEW OF SYSTEMS:  Unobtainable    Vital Signs  T(F): 97.8 (20 @ 11:33), Max: 98.2 (20 @ 03:15)  HR: 103 (20 @ 11:33) (81 - 120)  BP: 123/72 (20 @ 11:33) (112/66 - 154/84)  ABP: --  RR: 22 (20 @ 11:33) (20 - 22)  SpO2: 95% (20 @ 11:33) (94% - 98%)  Wt(kg): --  CVP(cm H2O): --  CO: --  PCWP: --    I and O's:    Daily     Daily     PHYSICAL EXAM:  Constitutional: well developed, obese  and in nad  HEENT: PERRLA,  + icteric sclera and mild pallor of conjunctiva noted  Neck: No JVD  Respiratory: reduced air entry lower lungs with no rales, wheezing or rhonchi  Cardiovascular: S1 and S2 normally heard  Gastrointestinal: soft, obese-distended, nontender and normal bowel sounds heard  Extremities: 1 plus  peripheral edema noted in both LEs  Neurological: lethargic/sedated  Skin: No rashes        LABS:                        14.9   5.68  )-----------( 55       ( 2020 06:53 )             42.5     2.8  -        127<L>  |  87<L>  |  19<H>  ----------------------------<  143<H>  4.1   |  28  |  0.88      126<L>  |  89<L>  |  16  ----------------------------<  173<H>  4.3   |  22  |  0.78  01-03    125<L>  |  86<L>  |  13  ----------------------------<  136<H>  4.1   |  30  |  0.76    Ca    10.1      2020 06:53  Ca    10.3      2020 00:50  Ca    10.2      2020 18:42  Phos  2.8       Mg     1.9       Mg     1.8         TPro  6.5  /  Alb  2.7<L>  /  TBili  4.0<H>  /  DBili  x   /  AST  413<H>  /  ALT  399<H>  /  AlkPhos  243<H>    TPro  6.8  /  Alb  2.8<L>  /  TBili  4.3<H>  /  DBili  x   /  AST  371<H>  /  ALT  411<H>  /  AlkPhos  246<H>            URINE STUDIES:  Urinalysis Basic - ( 2020 02:05 )    Color: Yellow / Appearance: Slightly Turbid / S.015 / pH: x  Gluc: x / Ketone: Trace  / Bili: Moderate / Urobili: 8   Blood: x / Protein: 100 / Nitrite: Positive   Leuk Esterase: Trace / RBC: 0-2 /HPF / WBC 0-2 /HPF   Sq Epi: x / Non Sq Epi: Occasional /HPF / Bacteria: Few /HPF                      RADIOLOGY & ADDITIONAL STUDIES:    < from: Xray Chest 1 View-PORTABLE IMMEDIATE (20 @ 16:50) >  EXAM:  XR CHEST PORTABLE IMMED 1V                            PROCEDURE DATE:  2020          INTERPRETATION:  Chest radiograph (one view)     CPT 24080    CLINICAL INFORMATION:  Patient is unable to indicate. Sepsis.  Short of breath.     TECHNIQUE:  Single frontal view of the chest was obtained.    FINDINGS:  No previous examinations are available for review.    The lungs linear atelectatic changes in the right upper lobe. No pleural effusion is seen. The heart and mediastinum appear intact.      IMPRESSION: Linear atelectatic changes seen in the right upper lobe.      < end of copied text >  < from: CT Abdomen and Pelvis w/ IV Cont (20 @ 20:04) >  EXAM:  CT ABDOMEN AND PELVIS IC                            PROCEDURE DATE:  2020          INTERPRETATION:  CT ABDOMEN AND PELVIS WITH IV CONTRAST    CLINICAL INFORMATION: lower abdominal pain, swelling, fever      COMPARISON: None.    PROCEDURE:   CT of the Abdomen and Pelvis was performed with intravenous contrast.  Intravenous contrast: 90 cc Omnipaque 350  Oral contrast: None.  Sagittal and coronal reformats were performed.    FINDINGS:    LOWER CHEST: Clear.    LIVER: Vague low-attenuation lesions throughout the liver with nodular contour.  BILE DUCTS: Nondilated.  GALLBLADDER: Normal.  SPLEEN: Normal.  PANCREAS: Normal.  ADRENALS: Mild bilateral nodularity.  KIDNEYS/URETERS: No calculi, hydronephrosis, or renal mass.    BLADDER: Normal.  REPRODUCTIVE ORGANS: Unremarkable prostate and seminal vesicles.    BOWEL: Short left mid abdominal intussusception, likely transient. No bowel obstruction. No evidence of diverticulitis or colitis.  PERITONEUM: No free air or ascites.  VESSELS:  Normal caliber aorta.  RETROPERITONEUM/LYMPH NODES: No adenopathy.    ABDOMINAL WALL: Soft tissue edema lower abdominal wall.  BONES: No aggressive lesion.    IMPRESSION:     No acute pathology. No diverticulitis or colitis.    Vague low-attenuation lesions throughout the liver with nodular contour. While findings may be related to cirrhosis, there are no ancillary findings of cirrhosis or portal hypertension. Diffuse metastatic disease to the liver could have a similar appearance. Follow-up MRI abdomen with and without contrast.    Soft tissue edema lower abdominal wall. Correlate for cellulitis.        < end of copied text >

## 2020-01-04 NOTE — H&P ADULT - PROBLEM SELECTOR PLAN 8
IMPROVE VTE Individual Risk Assessment          RISK                                                          Points  [  ] Previous VTE                                                3  [  ] Thrombophilia                                             2  [  ] Lower limb paralysis                                   2        (unable to hold up >15 seconds)    [  ] Current Cancer                                             2         (within 6 months)  [ X ] Immobilization > 24 hrs                              1  [  ] ICU/CCU stay > 24 hours                             1  [ X ] Age > 60                                                         1    IMPROVE VTE Score: 2    pradaxa will cover dvt ppx reports taking irbesartan at home  holding in favor of lasix and lopressor  monitor vitals platelets on admission 59 - baseline unknown  likely 2/2 to sepsis  c/w management as above  f/u cbc daily

## 2020-01-04 NOTE — H&P ADULT - PROBLEM SELECTOR PLAN 4
lactate on admission 4.3 - source unclear  avoid aggressive fluids given patient is volume overloaded  bedside doppler of LE confirms perfusion of LE bilaterally  will pursue CTA abdomen w/ IV contrast to r/o bowel ischemia  f/u repeat lactate patient meets sepsis criteria with HR>100, lactate 4.3, RR 22  not strongly convinced this is of infectious etiology, but given acuity of illness at this time and CT finding of abdominal soft tissue swelling, will treat for cellulitis  WBC count wnl, afebrile  starting unasyn  f/u blood cx x2  f/u UA and urine cx  f/u flu swab

## 2020-01-04 NOTE — H&P ADULT - HISTORY OF PRESENT ILLNESS
68 y/o male with PMHx of afib (on Pradaxa) and HTN 70 y/o male with PMHx of afib (on Pradaxa) and HTN presents to the ED with chief complaint of weakness and shortness of breath. Patient is AAOx2 and a vague historian. He reports having multiple falls in the past 2 weeks. He thinks he has experienced LOC sometimes as well. He endorses left lower chest pain, described as worse with movement and pleuritic in nature. He reports lower extremitiy      ED course:  LE arterial pulses noted bilterally 70 y/o male with PMHx of afib (on Pradaxa), HTN and alcohol abuse presents to the ED with chief complaint of weakness and shortness of breath. Patient is AAOx2 and a vague historian. He reports having multiple falls in the past 2 weeks. He thinks he has experienced LOC sometimes as well. He endorses left lower chest pain, described as worse with movement and pleuritic in nature. He reports lower extremitiy edema over the past few months. He also complains of left lower abdominal pain. He denies nausea or vomiting. He reports currently drinking 1 bottle of wine daily with a shot of scotch. He lives alone and says he ambulates independently.     ED course:  EKG shows afib with RVR  RLE noted to be cool to touch compared to left - bedside doppler confirmed pulses of both LE.

## 2020-01-04 NOTE — H&P ADULT - PROBLEM SELECTOR PLAN 2
proBNP on admission 1006 with evidence of fluid overload  s/s highly suggestive of heart failure which patient denies hx of  diurese with lasix 40mg IV push daily  troponin 0.125 on admission - likely demand due to afib with rvr  f/u t2 and t3  starting asa, statin and bblocker  strict I/Os  daily weights  f/u echo  tele monitoring  cards eval proBNP on admission 1006 with evidence of fluid overload  s/s highly suggestive of heart failure which patient denies hx of  diurese with lasix 40mg IV push daily  troponin 0.125 on admission - likely demand due to afib with rvr  f/u t2 and t3  starting asa, statin and bblocker  strict I/Os  daily weights  f/u echo  tele monitoring  cards eval - Dr. Sivan awan consulted for possible KIRSTIN component for dyspnea - Dr. Mccallum

## 2020-01-04 NOTE — H&P ADULT - PROBLEM SELECTOR PLAN 6
reports taking irbesartan at home  holding in favor of lasix and lopressor  monitor vitals  /  on admission  tbili 4.3 on admission  etiology unclear at this time  f/u cmp daily  f/u hepatitis panel  CT abdomen shows now CBD dilatation, but does show hypodense lesions  /  on admission  tbili 4.3 on admission  etiology unclear at this time  f/u cmp daily  f/u hepatitis panel  CT abdomen shows now CBD dilatation, but does show hypodense lesions  f/u CEA level  f/u dedicated US abdomen to assess RUQ reports drinking a bottle of wine and a shot of scotch daily for years  starting CIWA protocol with prn ativan only for now  aspiration, seizure and fall precautions  SW consulted  starting banana bag

## 2020-01-04 NOTE — H&P ADULT - PROBLEM SELECTOR PLAN 10
IMPROVE VTE Individual Risk Assessment          RISK                                                          Points  [  ] Previous VTE                                                3  [  ] Thrombophilia                                             2  [  ] Lower limb paralysis                                   2        (unable to hold up >15 seconds)    [  ] Current Cancer                                             2         (within 6 months)  [ X ] Immobilization > 24 hrs                              1  [  ] ICU/CCU stay > 24 hours                             1  [ X ] Age > 60                                                         1    IMPROVE VTE Score: 2    pradaxa will cover dvt ppx

## 2020-01-04 NOTE — H&P ADULT - PROBLEM SELECTOR PLAN 3
patient meets sepsis criteria with HR>100, lactate 4.3, RR 22  not strongly convinced this is of infectious etiology, but given acuity of illness at this time and CT finding of abdominal soft tissue swelling, will treat for cellulitis  WBC count wnl, afebrile  starting unasyn  f/u blood cx x2  f/u UA and urine cx  f/u flu swab  /  on admission  tbili 4.3 on admission  etiology unclear at this time  f/u cmp daily  f/u hepatitis panel  CT abdomen shows now CBD dilatation, but does show hypodense lesions  f/u CEA level  f/u dedicated US abdomen to assess RUQ  /  on admission  tbili 4.3 on admission  etiology unclear at this time  f/u cmp daily  f/u hepatitis panel  CT abdomen shows now CBD dilatation, but does show hypodense lesions  f/u CEA level  f/u dedicated US abdomen to assess RUQ  GI consulted - Dr. Martel

## 2020-01-04 NOTE — CONSULT NOTE ADULT - SUBJECTIVE AND OBJECTIVE BOX
[facility]    Patient is a 69y old  Male who presents with a chief complaint of sob (04 Jan 2020 14:31)    HPI:  70 y/o male with PMHx of afib (on Pradaxa), HTN and alcohol abuse presents to the ED with chief complaint of weakness and shortness of breath. Patient is AAOx2 and a vague historian. He reports having multiple falls in the past 2 weeks. He thinks he has experienced LOC sometimes as well. He endorses left lower chest pain, described as worse with movement and pleuritic in nature. He reports lower extremitiy edema over the past few months. He also complains of left lower abdominal pain. He denies nausea or vomiting. He reports currently drinking 1 bottle of wine daily with a shot of scotch. He lives alone and says he ambulates independently.    CT shows nodular liver with multiple lucencies which could be mets. Plts very low which could be seen in sepsis or cirrhosis, ITP etc. No biopsy of liver or lesions done     ED course:  EKG shows afib with RVR  RLE noted to be cool to touch compared to left - bedside doppler confirmed pulses of both LE. (04 Jan 2020 00:23)    PMH/PSH:  PAST MEDICAL & SURGICAL HISTORY:  HTN (hypertension)  Atrial fibrillation  No significant past surgical history    FH:  FAMILY HISTORY:  No pertinent family history in first degree relatives      MEDS:  MEDICATIONS  (STANDING):  ampicillin/sulbactam  IVPB      ampicillin/sulbactam  IVPB 3 Gram(s) IV Intermittent every 6 hours  aspirin  chewable 81 milliGRAM(s) Oral daily  atorvastatin 40 milliGRAM(s) Oral at bedtime  dabigatran 150 milliGRAM(s) Oral every 12 hours  folic acid 1 milliGRAM(s) Oral daily  furosemide   Injectable 40 milliGRAM(s) IV Push daily  insulin lispro (HumaLOG) corrective regimen sliding scale   SubCutaneous three times a day before meals  LORazepam   Injectable 2 milliGRAM(s) IV Push every 4 hours  metoprolol tartrate 25 milliGRAM(s) Oral two times a day  multivitamin 1 Tablet(s) Oral daily  nystatin Powder 1 Application(s) Topical three times a day  sodium chloride 0.9% Bolus 1000 milliLiter(s) IV Bolus once  sodium chloride 0.9% Bolus 1000 milliLiter(s) IV Bolus once  thiamine IVPB 500 milliGRAM(s) IV Intermittent every 8 hours    MEDICATIONS  (PRN):  LORazepam   Injectable 2 milliGRAM(s) IV Push every 2 hours PRN Agitation    Allergies    No Known Allergies    Intolerances            CONSTITUTIONAL:  No weight loss, fever, chills, weakness or fatigue.  HEENT:  Eyes:  No visual loss, blurred vision, double vision or yellow sclerae. Ears, Nose, Throat:  No hearing loss, sneezing, congestion, runny nose or sore throat.  SKIN:  No rash or itching.  CARDIOVASCULAR:  No chest pain, chest pressure or chest discomfort. No palpitations or edema.  RESPIRATORY:  No shortness of breath, cough or sputum.  GASTROINTESTINAL:  SEE HPI  GENITOURINARY:  No dysuria, hematuria, urinary frequency  NEUROLOGICAL:  No headache, dizziness, syncope, paralysis, ataxia, numbness or tingling in the extremities. No change in bowel or bladder control.  MUSCULOSKELETAL:  No muscle, back pain, joint pain or stiffness.  HEMATOLOGIC:  No anemia, bleeding or bruising.  LYMPHATICS:  No enlarged nodes. No history of splenectomy.  PSYCHIATRIC:  No history of depression or anxiety.  ENDOCRINOLOGIC:  No reports of sweating, cold or heat intolerance. No polyuria or polydipsia.      ______________________________________________________________________  PHYSICAL EXAM:  T(C): 36.9 (01-04-20 @ 16:34), Max: 36.9 (01-04-20 @ 16:34)  HR: 81 (01-04-20 @ 16:34)  BP: 127/72 (01-04-20 @ 16:34)  RR: 18 (01-04-20 @ 16:34)  SpO2: 94% (01-04-20 @ 16:34)  Wt(kg): --      GEN: NAD, normocephalic  CVS: ?afib  CHEST: clear to auscultation  ABD: soft , nontender, nondistended, bowel sounds present  EXTR: no cyanosis, no clubbing, ++ edema  NEURO: sleeping and hard to arouse   SKIN:  warm;  non icteric    ______________________________________________________________________  LABS:                        14.9   5.68  )-----------( 55       ( 04 Jan 2020 06:53 )             42.5     01-04    127<L>  |  87<L>  |  19<H>  ----------------------------<  143<H>  4.1   |  28  |  0.88    Ca    10.1      04 Jan 2020 06:53  Phos  2.8     01-04  Mg     1.9     01-04    TPro  6.5  /  Alb  2.7<L>  /  TBili  4.0<H>  /  DBili  x   /  AST  413<H>  /  ALT  399<H>  /  AlkPhos  243<H>  01-04    LIVER FUNCTIONS - ( 04 Jan 2020 06:53 )  Alb: 2.7 g/dL / Pro: 6.5 g/dL / ALK PHOS: 243 U/L / ALT: 399 U/L DA / AST: 413 U/L / GGT: x           PT/INR - ( 03 Jan 2020 18:42 )   PT: 13.2 sec;   INR: 1.18 ratio         PTT - ( 03 Jan 2020 18:42 )  PTT:30.9 sec  ____________________________________________    IMAGING:    ______________________________________________________________________  ASSESSMENT:  69y Male    PLAN:

## 2020-01-04 NOTE — H&P ADULT - PROBLEM SELECTOR PLAN 5
reports drinking a bottle of wine and a shot of scotch daily for years  starting CIWA protocol with prn ativan only for now  aspiration, seizure and fall precautions  SW consulted  starting banana bag lactate on admission 4.3 - source unclear  avoid aggressive fluids given patient is volume overloaded  bedside doppler of LE confirms perfusion of LE bilaterally  will pursue CTA abdomen w/ IV contrast to r/o bowel ischemia  f/u repeat lactate

## 2020-01-04 NOTE — H&P ADULT - PROBLEM SELECTOR PLAN 7
IMPROVE VTE Individual Risk Assessment          RISK                                                          Points  [  ] Previous VTE                                                3  [  ] Thrombophilia                                             2  [  ] Lower limb paralysis                                   2        (unable to hold up >15 seconds)    [  ] Current Cancer                                             2         (within 6 months)  [ X ] Immobilization > 24 hrs                              1  [  ] ICU/CCU stay > 24 hours                             1  [ X ] Age > 60                                                         1    IMPROVE VTE Score: 2    pradaxa will cover dvt ppx reports taking irbesartan at home  holding in favor of lasix and lopressor  monitor vitals Na 125 on admission  likely hypervolemic hyponatremia 2/2 to CHF  c/w diuresis as above and fluid restriction  f/u bmp in AM  f/u serum osm and urine osm  f/u urine Na

## 2020-01-04 NOTE — H&P ADULT - ASSESSMENT
70 y/o male admitted to [facility] for afib with RVR and evidence of acute heart failure. Patient meets sepsis criteria and CT abdomen shows evidence of cellulitis, however, clinically this is less likely. Will cover with unasyn at this time and rate control patient as well as achieve adequate diuresis. Appreciate cards eval in AM.    Patient does not recall home dose of irbesartan. Primary team to f/u with pharmacy prior to discharge.

## 2020-01-04 NOTE — H&P ADULT - PROBLEM SELECTOR PLAN 1
HR on admission 112  EKG shows afib with RVR  resuming home med Pradaxa  s/p lopressor 5mg IV push x1 - rate control achieved  tele monitoring  c/w lopressor 25mg bid  f/u echo  cards eval HR on admission 112  EKG shows afib with RVR  resuming home med Pradaxa  s/p lopressor 5mg IV push x1 - rate control achieved  tele monitoring  c/w lopressor 25mg bid  f/u echo  cards penny Finnegan

## 2020-01-04 NOTE — H&P ADULT - NSHPSOCIALHISTORY_GEN_ALL_CORE
Former smoker - 1ppd for 40 years; quit at age 57; drinks heavily - 1 bottle of wine daily; lives alone

## 2020-01-05 NOTE — CONSULT NOTE ADULT - SUBJECTIVE AND OBJECTIVE BOX
Patient is a 69y old  Male who presents with a chief complaint of Afib and CHF exacerbation (2020 10:48)      Initial HPI on admission:  HPI:  68 y/o male with PMHx of afib (on Pradaxa), HTN and alcohol abuse presents to the ED with chief complaint of weakness and shortness of breath. Patient is AAOx2 and a vague historian. He reports having multiple falls in the past 2 weeks. He thinks he has experienced LOC sometimes as well. He endorses left lower chest pain, described as worse with movement and pleuritic in nature. He reports lower extremitiy edema over the past few months. He also complains of left lower abdominal pain. He denies nausea or vomiting. He reports currently drinking 1 bottle of wine daily with a shot of scotch. He lives alone and says he ambulates independently.     ED course:  EKG shows afib with RVR  RLE noted to be cool to touch compared to left - bedside doppler confirmed pulses of both LE. (2020 00:23)      BRIEF HOSPITAL COURSE: ***    Patient is initially admitted to medical floor for CHF exacerbation, afib with RVR, alcohol withdrawal and sepsis 2/2 cellulitis. Patient is AAOx3 at baseline, noted lethargic by nurse this morning. Patient is on CIWA protocol and received total 8 mg of ativan Iv push in last 24 hours. RRT was called for respiratory distress and lethargy, patient is minimally arousable to sternal rub, not able to follow commands.           PAST MEDICAL & SURGICAL HISTORY:  HTN (hypertension)  Atrial fibrillation  No significant past surgical history    Allergies    No Known Allergies    Intolerances      FAMILY HISTORY:  No pertinent family history in first degree relatives    Social history reviewed: ***    Review of Systems:  CONSTITUTIONAL: No fever, chills, or fatigue  EYES: No eye pain, visual disturbances, or discharge  ENMT:  No difficulty hearing, tinnitus, vertigo; No sinus or throat pain  NECK: No pain or stiffness  RESPIRATORY: No cough, wheezing, chills or hemoptysis; No shortness of breath  CARDIOVASCULAR: No chest pain, palpitations, dizziness, or leg swelling  GASTROINTESTINAL: No abdominal or epigastric pain. No nausea, vomiting, or hematemesis; No diarrhea or constipation. No melena or hematochezia.  GENITOURINARY: No dysuria, frequency, hematuria, or incontinence  NEUROLOGICAL: No headaches, memory loss, loss of strength, numbness, or tremors  SKIN: No itching, burning, rashes, or lesions   MUSCULOSKELETAL: No joint pain or swelling; No muscle, back, or extremity pain  PSYCHIATRIC: No depression, anxiety, mood swings, or difficulty sleeping      Medications:  ampicillin/sulbactam  IVPB      ampicillin/sulbactam  IVPB 3 Gram(s) IV Intermittent every 6 hours  aspirin  chewable 81 milliGRAM(s) Oral daily  atorvastatin 40 milliGRAM(s) Oral at bedtime  chlorhexidine 0.12% Liquid 15 milliLiter(s) Oral Mucosa every 12 hours  folic acid 1 milliGRAM(s) Oral daily  furosemide   Injectable 40 milliGRAM(s) IV Push daily  insulin lispro (HumaLOG) corrective regimen sliding scale   SubCutaneous three times a day before meals  metoprolol tartrate 25 milliGRAM(s) Oral two times a day  multivitamin 1 Tablet(s) Oral daily  nystatin Powder 1 Application(s) Topical three times a day  pantoprazole   Suspension 40 milliGRAM(s) Enteral Tube daily  propofol Infusion 5 MICROgram(s)/kG/Min IV Continuous <Continuous>  thiamine IVPB 500 milliGRAM(s) IV Intermittent every 8 hours      vent settings  Mode: AC/ CMV (Assist Control/ Continuous Mandatory Ventilation)  RR (machine): 14  TV (machine): 500  FiO2: 50  PEEP: 5  ITime: 1  MAP: 13  PIP: 28      Vital Signs Last 24 Hrs  T(C): 36.3 (2020 08:18), Max: 36.9 (2020 16:34)  T(F): 97.4 (2020 08:18), Max: 98.5 (2020 16:34)  HR: 119 (2020 12:30) (75 - 119)  BP: 87/51 (2020 12:30) (84/47 - 154/78)  BP(mean): 59 (2020 12:30) (56 - 96)  RR: 23 (2020 12:30) (17 - 23)  SpO2: 92% (2020 12:30) (90% - 97%)    ABG - ( 2020 10:38 )  pH, Arterial: 7.38  pH, Blood: x     /  pCO2: 55    /  pO2: 95    / HCO3: 32    / Base Excess: 5.9   /  SaO2: 96                        LABS:                        16.2   5.37  )-----------( 44       ( 2020 07:14 )             47.3         132<L>  |  91<L>  |  18  ----------------------------<  82  4.0   |  30  |  0.70    Ca    10.1      2020 07:14  Phos  3.8       Mg     2.0         TPro  6.8  /  Alb  3.0<L>  /  TBili  5.3<H>  /  DBili  x   /  AST  447<H>  /  ALT  416<H>  /  AlkPhos  263<H>        CARDIAC MARKERS ( 2020 06:53 )  0.094 ng/mL / x     / 370 U/L / x     / 7.1 ng/mL  CARDIAC MARKERS ( 2020 00:50 )  0.118 ng/mL / x     / 436 U/L / x     / 8.2 ng/mL  CARDIAC MARKERS ( 2020 18:42 )  0.125 ng/mL / x     / x     / x     / x          CAPILLARY BLOOD GLUCOSE      POCT Blood Glucose.: 72 mg/dL (2020 13:46)    PT/INR - ( 2020 18:42 )   PT: 13.2 sec;   INR: 1.18 ratio         PTT - ( 2020 18:42 )  PTT:30.9 sec  Urinalysis Basic - ( 2020 02:05 )    Color: Yellow / Appearance: Slightly Turbid / S.015 / pH: x  Gluc: x / Ketone: Trace  / Bili: Moderate / Urobili: 8   Blood: x / Protein: 100 / Nitrite: Positive   Leuk Esterase: Trace / RBC: 0-2 /HPF / WBC 0-2 /HPF   Sq Epi: x / Non Sq Epi: Occasional /HPF / Bacteria: Few /HPF      CULTURES:  Culture Results:   >=3 organisms. Probable collection contamination. ( @ 10:10)  Culture Results:   No growth to date. ( @ 02:39)  Culture Results:   No growth to date. ( @ 02:39)    .Urine  .Blood      Physical Examination:    General: No acute distress.  intubated and dedated    HEENT: Pupils equal, reactive to light.  Symmetric. + ETT    PULM: Clear to auscultation bilaterally, no significant sputum production    CVS: Regular rate and rhythm, no murmurs, rubs, or gallops    ABD: Soft, nondistended, nontender, normoactive bowel sounds, no masses    EXT: No edema, nontender    SKIN: Warm and well perfused, no rashes noted.    NEURO:  sedated    RADIOLOGY REVIEWED ***    CXR: post intubation; + ETT, no PTX      IMPRESSION:PAST MEDICAL & SURGICAL HISTORY:  HTN (hypertension)  Atrial fibrillation  No significant past surgical history   p/w         IMP: This is a 69 yr old man with  afib (on Pradaxa), HTN and alcohol abuse presents to the ED with chief complaint of weakness and shortness of breath. Patient is initially admitted to medical floor for CHF exacerbation, afib with RVR, alcohol withdrawal and sepsis 2/2 cellulitis. Patient is AAOx2-3 at baseline, noted lethargic by nurse this morning. Patient is on CIWA protocol and received total 8 mg of ativan Iv push in last 24 hours. RRT was called for respiratory distress and lethargy, patient is minimally arousable to sternal rub, not able to follow commands.     Plan:     Neuro:   AAOx2-3 at baseline   noted lethargic   likely combination form hypercarbia and medication induced (on ativan as per CIWA)  will admit to ICU - will need intubation and vent     Respi:   Acute hypercapnic respiratory failure:   likely from combination of CHF and medication induced form ativan   AB on ventimask   lethargic + using accessory muscles   admit to ICU - will likely need intubation and vent support   follow post intubation chest xray and ABG     CVS:   # Afib:   history of Afib   rate controlled with metoprolol, on pradexa for anticoagulation   will hold pradexa for now   will consider starting heparin drip     CHF:  admitted with CHF exacerbation   on lasix 40 mg IV push daily   will continue lasix for now   stict I/O and daily weight   follow echo    GI:   NPO for now  start on TF after NG placement     # Transaminitis:    /  on admission -stable   tbili 4.3 on admission  etiology unclear at this time  hepatitis panel - negative   CT abdomen shows now CBD dilatation, but does show hypodense lesions  f/u CEA level  f/u dedicated US abdomen to assess RUQ  GI consulted - Dr. Martel.  Heme:  # Thrombocytopenia.    Platelets on admission 59 -> 55 -> 44  likely 2/2 to sepsis  trend daily CBC     Endo:   #DM:   HbA1c of 6.5  sliding scale q6 while NPO     ID:  # cellulitis:   abdominal cellulitis on CT A/p   on amoxicilliln - continue     Renal:   no acute issues     PPx:   was on pradexa - holding   protonix IV for GI  prophylaxis          magda of care discussed with family and ICU team./icu team / medical team/     CRITICAL CARE TIME SPENT:  85  minutes for critical care time and time for preparation for and intubation

## 2020-01-05 NOTE — CONSULT NOTE ADULT - SUBJECTIVE AND OBJECTIVE BOX
Patient is a 69y old  Male who presents with a chief complaint of CHF (2020 18:36)      Initial HPI on admission:  HPI:  68 y/o male with PMHx of afib (on Pradaxa), HTN and alcohol abuse presents to the ED with chief complaint of weakness and shortness of breath. Patient is AAOx2 and a vague historian. He reports having multiple falls in the past 2 weeks. He thinks he has experienced LOC sometimes as well. He endorses left lower chest pain, described as worse with movement and pleuritic in nature. He reports lower extremitiy edema over the past few months. He also complains of left lower abdominal pain. He denies nausea or vomiting. He reports currently drinking 1 bottle of wine daily with a shot of scotch. He lives alone and says he ambulates independently.       BRIEF HOSPITAL COURSE:   Patient is initially admitted to medical floor for CHF exacerbation, afib with RVR, alcohol withdrawal and sepsis 2/2 cellulitis. Patient is AAOx3 at baseline, noted lethargic by nurse this morning. Patient is on CIWA protocol and received total 8 mg of ativan Iv push in last 24 hours. RRT was called for respiratory distress and lethargy, patient is minimally arousable to sternal rub, not able to follow commands.     PAST MEDICAL & SURGICAL HISTORY:  HTN (hypertension)  Atrial fibrillation  No significant past surgical history    Allergies    No Known Allergies    Intolerances      FAMILY HISTORY:  No pertinent family history in first degree relatives    Social history reviewed: ***    Review of Systems:  unable to obtain      Medications:  ampicillin/sulbactam  IVPB      ampicillin/sulbactam  IVPB 3 Gram(s) IV Intermittent every 6 hours  aspirin  chewable 81 milliGRAM(s) Oral daily  atorvastatin 40 milliGRAM(s) Oral at bedtime  folic acid 1 milliGRAM(s) Oral daily  furosemide   Injectable 40 milliGRAM(s) IV Push daily  insulin lispro (HumaLOG) corrective regimen sliding scale   SubCutaneous three times a day before meals  metoprolol tartrate 25 milliGRAM(s) Oral two times a day  multivitamin 1 Tablet(s) Oral daily  nystatin Powder 1 Application(s) Topical three times a day  thiamine IVPB 500 milliGRAM(s) IV Intermittent every 8 hours      vent settings      Vital Signs Last 24 Hrs  T(C): 36.3 (2020 08:18), Max: 36.9 (2020 16:34)  T(F): 97.4 (2020 08:18), Max: 98.5 (2020 16:34)  HR: 111 (2020 08:18) (75 - 111)  BP: 150/96 (2020 08:18) (120/74 - 154/78)  BP(mean): --  RR: 18 (2020 08:18) (18 - 22)  SpO2: 90% (2020 08:18) (90% - 97%)    ABG - ( 2020 10:38 )  pH, Arterial: 7.38  pH, Blood: x     /  pCO2: 55    /  pO2: 95    / HCO3: 32    / Base Excess: 5.9   /  SaO2: 96                        LABS:                        16.2   5.37  )-----------( 44       ( 2020 07:14 )             47.3     01-05    132<L>  |  91<L>  |  18  ----------------------------<  82  4.0   |  30  |  0.70    Ca    10.1      2020 07:14  Phos  3.8     01-05  Mg     2.0     01-05    TPro  6.8  /  Alb  3.0<L>  /  TBili  5.3<H>  /  DBili  x   /  AST  447<H>  /  ALT  416<H>  /  AlkPhos  263<H>  01-05      CARDIAC MARKERS ( 2020 06:53 )  0.094 ng/mL / x     / 370 U/L / x     / 7.1 ng/mL  CARDIAC MARKERS ( 2020 00:50 )  0.118 ng/mL / x     / 436 U/L / x     / 8.2 ng/mL  CARDIAC MARKERS ( 2020 18:42 )  0.125 ng/mL / x     / x     / x     / x          CAPILLARY BLOOD GLUCOSE      POCT Blood Glucose.: 77 mg/dL (2020 10:25)    PT/INR - ( 2020 18:42 )   PT: 13.2 sec;   INR: 1.18 ratio         PTT - ( 2020 18:42 )  PTT:30.9 sec  Urinalysis Basic - ( 2020 02:05 )    Color: Yellow / Appearance: Slightly Turbid / S.015 / pH: x  Gluc: x / Ketone: Trace  / Bili: Moderate / Urobili: 8   Blood: x / Protein: 100 / Nitrite: Positive   Leuk Esterase: Trace / RBC: 0-2 /HPF / WBC 0-2 /HPF   Sq Epi: x / Non Sq Epi: Occasional /HPF / Bacteria: Few /HPF      CULTURES:  Culture Results:   >=3 organisms. Probable collection contamination. ( @ 10:10)  Culture Results:   No growth to date. ( @ 02:39)  Culture Results:   No growth to date. ( @ 02:39)    .Urine  .Blood      Physical Examination:    · Constitutional	Well-developed, obese male   · Eyes	conjuctivae clear  · ENMT	No oral lesions; no gross abnormalities  · Neck	No bruits; no thyromegaly or nodules   · Back	No deformity or limitation of movement   · Respiratory	accessory muscle use, wheezing +   · Cardiovascular	Regular rate & rhythm, normal S1, S2; no murmurs, gallops or rubs; no S3, S4  · Gastrointestinal	Soft, non-tender, no hepatosplenomegaly, normal bowel sounds  · Extremities	No cyanosis, clubbing or edema   · Neurological	arousable to painful stimuli, not able to follow commands, pupils b/l reactive       RADIOLOGY REVIEWED: < from: CT Abdomen and Pelvis w/ IV Cont (20 @ 20:04) >  IMPRESSION:     No acute pathology. No diverticulitis or colitis.    Vague low-attenuation lesions throughout the liver with nodular contour. While findings may be related to cirrhosis, there are no ancillary findings of cirrhosis or portal hypertension. Diffuse metastatic disease to the liver could have a similar appearance. Follow-up MRI abdomen with and without contrast.    Soft tissue edema lower abdominal wall. Correlate for cellulitis.      < end of copied text >      CRITICAL CARE TIME SPENT: 35 minutes

## 2020-01-05 NOTE — CHART NOTE - NSCHARTNOTEFT_GEN_A_CORE
INDICATION: Hypercapnic respiratory failure     PROCEDURE:  [x] LIMITED ECHO  [x] LIMITED CHEST  [ ] LIMITED RETROPERITONEAL  [x] LIMITED ABDOMINAL  [ ] LIMITED DVT  [ ] NEEDLE GUIDANCE VASCULAR  [ ] NEEDLE GUIDANCE THORACENTESIS  [ ] NEEDLE GUIDANCE PARACENTESIS  [ ] NEEDLE GUIDANCE PERICARDIOCENTESIS  [ ] OTHER    FINDINGS:  Lungs: Bilateral predominant A lines, Mild B-lines on Rt  Heart: Grossly normal systolic function, RV size = LV size  Abdomen: Normal renal parenchyma, IVC 2.74 cm.

## 2020-01-05 NOTE — AIRWAY PLACEMENT NOTE ADULT - AIRWAY COMMENTS:
Grade 2 View was obtained with bimanual manipulation and ETT was placed under supervision of Dr. Mccallum.

## 2020-01-05 NOTE — AIRWAY PLACEMENT NOTE ADULT - POST AIRWAY PLACEMENT ASSESSMENT:
breath sounds bilateral/breath sounds equal/positive end tidal CO2 noted breath sounds bilateral/CXR pending/breath sounds equal/chest excursion noted/skin color improved/positive end tidal CO2 noted

## 2020-01-05 NOTE — PROGRESS NOTE ADULT - ASSESSMENT
A/P:  1.HYPONATERMIA: Hypervolemic, most likley secondary to chf/liver ds induced  -pts Na is improving slowly  -suggest to continue current care with diuretics and fluid restriction to 1000 ml per day  -f/u Na level daily  2.HYPERCALCEMIA: mild , secondary to ? immobilization vs  hypercalcemia of malignancy?  -suggest to continue Lasix   -f/u ca level daily  -suggest malignancy w/u if ca remains high  3.EDEMA:multifactorial like chf/liver ds  -avoid iv fluid  Rest as per medical team and GI A/P:  1.HYPONATERMIA: Hypervolemic, most likley secondary to chf/liver ds induced  -pts Na is improving slowly  -suggest to continue current care with diuretics and fluid restriction to 1000 ml per day  -f/u Na level daily  2.HYPERCALCEMIA: mild , secondary to ? immobilization vs  hypercalcemia of malignancy?  -suggest to continue Lasix   -f/u ca level daily  -suggest malignancy w/u if ca remains high  3.EDEMA:multifactorial like chf/liver ds  -avoid iv fluid  Rest as per micu team

## 2020-01-05 NOTE — PROGRESS NOTE ADULT - SUBJECTIVE AND OBJECTIVE BOX
This note is incomplete  pt seen and examined.pts current chart reviewed and case discussed with resident covering.    SUBJECTIVE:  pt feels fine and denies cp,sob,gi or gu/uremic  symptons    REVIEW OF SYSTEMS:  CONSTITUTIONAL: No weakness, fevers or chills  EYES/ENT: No visual changes;  No vertigo or throat pain   NECK: No pain or stiffness  RESPIRATORY: No cough, wheezing, hemoptysis; No shortness of breath  CARDIOVASCULAR: No chest pain or palpitations  GASTROINTESTINAL: No abdominal or epigastric pain. No nausea, vomiting, or hematemesis; No diarrhea or constipation. No melena or hematochezia.  GENITOURINARY: No dysuria, frequency , hematuria, flank pain or nocturia  NEUROLOGICAL: No numbness or weakness  SKIN: No itching, burning, rashes, or lesions   All other review of systems is negative unless indicated above    Current meds:    ampicillin/sulbactam  IVPB      ampicillin/sulbactam  IVPB 3 Gram(s) IV Intermittent every 6 hours  aspirin  chewable 81 milliGRAM(s) Oral daily  atorvastatin 40 milliGRAM(s) Oral at bedtime  chlorhexidine 0.12% Liquid 15 milliLiter(s) Oral Mucosa every 12 hours  folic acid 1 milliGRAM(s) Oral daily  furosemide   Injectable 40 milliGRAM(s) IV Push daily  insulin lispro (HumaLOG) corrective regimen sliding scale   SubCutaneous three times a day before meals  metoprolol tartrate 25 milliGRAM(s) Oral two times a day  multivitamin 1 Tablet(s) Oral daily  nystatin Powder 1 Application(s) Topical three times a day  pantoprazole   Suspension 40 milliGRAM(s) Enteral Tube daily  propofol Infusion 5 MICROgram(s)/kG/Min IV Continuous <Continuous>  thiamine IVPB 500 milliGRAM(s) IV Intermittent every 8 hours      Vital Signs    T(F): 99.1 (01-05-20 @ 15:58), Max: 99.1 (01-05-20 @ 15:58)  HR: 98 (01-05-20 @ 16:01) (75 - 119)  BP: 101/61 (01-05-20 @ 15:00) (84/47 - 154/78)  ABP: --  RR: 14 (01-05-20 @ 15:00) (14 - 23)  SpO2: 95% (01-05-20 @ 16:01) (90% - 97%)  Wt(kg): --  CVP(cm H2O): --  CO: --  PCWP: --    I and O's:    Daily     Daily     PHYSICAL EXAM:  Constitutional: well developed, well nourished  and in nad  HEENT: PERRLA,  no icteric sclera and mild pallor of conjunctiva noted  Neck: No JVD, thyromegaly or adenopathy  Respiratory: reduced air entry lower lungs with no rales, wheezing or rhonchi  Cardiovascular: S1 and S2 normally heard  Gastrointestinal: soft, nondistended, nontender and normal bowel sounds heard  Extremities: No peripheral edema or cyanosis  Neurological: A/O x 3, no focal deficits  : No flank or cva tenderness palpated.  Skin: No rashes      LABS:    CBC:                          16.2   5.37  )-----------( 44       ( 05 Jan 2020 07:14 )             47.3           BMP:    01-05    132<L>  |  91<L>  |  18  ----------------------------<  82  4.0   |  30  |  0.70  01-04    131<L>  |  94<L>  |  20<H>  ----------------------------<  98  4.1   |  30  |  0.73  01-04    127<L>  |  87<L>  |  19<H>  ----------------------------<  143<H>  4.1   |  28  |  0.88  01-04    126<L>  |  89<L>  |  16  ----------------------------<  173<H>  4.3   |  22  |  0.78  01-03    125<L>  |  86<L>  |  13  ----------------------------<  136<H>  4.1   |  30  |  0.76    Ca    10.1      05 Jan 2020 07:14  Ca    9.8      04 Jan 2020 18:37  Ca    10.1      04 Jan 2020 06:53  Ca    10.3      04 Jan 2020 00:50  Ca    10.2      03 Jan 2020 18:42  Phos  3.8     01-05  Phos  2.8     01-04  Mg     2.0     01-05  Mg     1.9     01-04  Mg     1.8     01-03    TPro  6.8  /  Alb  3.0<L>  /  TBili  5.3<H>  /  DBili  x   /  AST  447<H>  /  ALT  416<H>  /  AlkPhos  263<H>  01-05  TPro  6.5  /  Alb  2.7<L>  /  TBili  4.0<H>  /  DBili  x   /  AST  413<H>  /  ALT  399<H>  /  AlkPhos  243<H>  01-04  TPro  6.8  /  Alb  2.8<L>  /  TBili  4.3<H>  /  DBili  x   /  AST  371<H>  /  ALT  411<H>  /  AlkPhos  246<H>  01-03      Thyroid Stimulating Hormone, Serum: 1.34 uU/mL (01-04 @ 06:53)      URINE STUDIES:                        RADIOLOGY & ADDITIONAL STUDIES: pt seen and examined.    SUBJECTIVE:  pt is intubated /sedated    REVIEW OF SYSTEMS:  Unobtainable    Current meds:    ampicillin/sulbactam  IVPB      ampicillin/sulbactam  IVPB 3 Gram(s) IV Intermittent every 6 hours  aspirin  chewable 81 milliGRAM(s) Oral daily  atorvastatin 40 milliGRAM(s) Oral at bedtime  chlorhexidine 0.12% Liquid 15 milliLiter(s) Oral Mucosa every 12 hours  folic acid 1 milliGRAM(s) Oral daily  furosemide   Injectable 40 milliGRAM(s) IV Push daily  insulin lispro (HumaLOG) corrective regimen sliding scale   SubCutaneous three times a day before meals  metoprolol tartrate 25 milliGRAM(s) Oral two times a day  multivitamin 1 Tablet(s) Oral daily  nystatin Powder 1 Application(s) Topical three times a day  pantoprazole   Suspension 40 milliGRAM(s) Enteral Tube daily  propofol Infusion 5 MICROgram(s)/kG/Min IV Continuous <Continuous>  thiamine IVPB 500 milliGRAM(s) IV Intermittent every 8 hours      Vital Signs    T(F): 99.1 (01-05-20 @ 15:58), Max: 99.1 (01-05-20 @ 15:58)  HR: 98 (01-05-20 @ 16:01) (75 - 119)  BP: 101/61 (01-05-20 @ 15:00) (84/47 - 154/78)  ABP: --  RR: 14 (01-05-20 @ 15:00) (14 - 23)  SpO2: 95% (01-05-20 @ 16:01) (90% - 97%)  Wt(kg): --  CVP(cm H2O): --  CO: --  PCWP: --    I and O's:    Daily     Daily     PHYSICAL EXAM:  Constitutional: well developed, obese  and in nad  HEENT: PERRLA,  + icteric sclera and mild pallor of conjunctiva noted  Neck: No JVD  Respiratory: reduced air entry lower lungs with no rales, wheezing or rhonchi  Cardiovascular: S1 and S2 normally heard  Gastrointestinal: soft, obese-distended, nontender and normal bowel sounds heard  Extremities: 1 plus  peripheral edema noted in both LEs  Neurological: sedated  Skin: No rashes    LABS:    CBC:                          16.2   5.37  )-----------( 44       ( 05 Jan 2020 07:14 )             47.3     BMP:    01-05    132<L>  |  91<L>  |  18  ----------------------------<  82  4.0   |  30  |  0.70  01-04    131<L>  |  94<L>  |  20<H>  ----------------------------<  98  4.1   |  30  |  0.73  01-04    127<L>  |  87<L>  |  19<H>  ----------------------------<  143<H>  4.1   |  28  |  0.88  01-04    126<L>  |  89<L>  |  16  ----------------------------<  173<H>  4.3   |  22  |  0.78  01-03    125<L>  |  86<L>  |  13  ----------------------------<  136<H>  4.1   |  30  |  0.76    Ca    10.1      05 Jan 2020 07:14  Ca    9.8      04 Jan 2020 18:37  Ca    10.1      04 Jan 2020 06:53  Ca    10.3      04 Jan 2020 00:50  Ca    10.2      03 Jan 2020 18:42  Phos  3.8     01-05  Phos  2.8     01-04  Mg     2.0     01-05  Mg     1.9     01-04  Mg     1.8     01-03    TPro  6.8  /  Alb  3.0<L>  /  TBili  5.3<H>  /  DBili  x   /  AST  447<H>  /  ALT  416<H>  /  AlkPhos  263<H>  01-05  TPro  6.5  /  Alb  2.7<L>  /  TBili  4.0<H>  /  DBili  x   /  AST  413<H>  /  ALT  399<H>  /  AlkPhos  243<H>  01-04  TPro  6.8  /  Alb  2.8<L>  /  TBili  4.3<H>  /  DBili  x   /  AST  371<H>  /  ALT  411<H>  /  AlkPhos  246<H>  01-03      Thyroid Stimulating Hormone, Serum: 1.34 uU/mL (01-04 @ 06:53)          RADIOLOGY & ADDITIONAL STUDIES:    < from: Xray Chest 1 View-PORTABLE IMMEDIATE (01.05.20 @ 12:21) >  EXAM:  XR CHEST PORTABLE IMMED 1V                            PROCEDURE DATE:  01/05/2020          INTERPRETATION:  CLINICAL INDICATION: 69 years  Male with s/p intubation.    COMPARISON: 1/3/2020    The tip of the ET tube is at the clavicular headsthe tip of the NG tube is below the bottom of the image.    AP view of the chest demonstrates a right upper lobe infiltrate. Cannot rule out underlying mass. The left lung is clear. There is no pleural effusion. There is no pneumothorax.    The heartis normal in size. There is no mediastinal or hilar mass.     The pulmonary vasculature is normal.     Mild thoracic degenerative changes are present.    IMPRESSION:    ET tube in satisfactory position. NG tube tip is below the diaphragm and below the bottom of the image.    Right upper lobe infiltrate. Cannot rule out underlying mass. Recommend chest CT.      < end of copied text >

## 2020-01-06 NOTE — DIETITIAN INITIAL EVALUATION ADULT. - PROBLEM SELECTOR PLAN 6
reports drinking a bottle of wine and a shot of scotch daily for years  starting CIWA protocol with prn ativan only for now  aspiration, seizure and fall precautions  SW consulted  starting banana bag

## 2020-01-06 NOTE — DIETITIAN INITIAL EVALUATION ADULT. - PERTINENT MEDS FT
MEDICATIONS  (STANDING):  ampicillin/sulbactam  IVPB      ampicillin/sulbactam  IVPB 3 Gram(s) IV Intermittent every 6 hours  aspirin  chewable 81 milliGRAM(s) Oral daily  atorvastatin 40 milliGRAM(s) Oral at bedtime  chlorhexidine 0.12% Liquid 15 milliLiter(s) Oral Mucosa every 12 hours  chlorhexidine 4% Liquid 1 Application(s) Topical daily  folic acid 1 milliGRAM(s) Oral daily  furosemide   Injectable 40 milliGRAM(s) IV Push daily  heparin  Infusion.  Unit(s)/Hr (24 mL/Hr) IV Continuous <Continuous>  insulin lispro (HumaLOG) corrective regimen sliding scale   SubCutaneous every 6 hours  metoprolol tartrate 25 milliGRAM(s) Oral two times a day  multivitamin 1 Tablet(s) Oral daily  nystatin Powder 1 Application(s) Topical three times a day  pantoprazole   Suspension 40 milliGRAM(s) Enteral Tube daily  propofol Infusion 5.444 MICROgram(s)/kG/Min (4.083 mL/Hr) IV Continuous <Continuous>  thiamine IVPB 500 milliGRAM(s) IV Intermittent every 8 hours    MEDICATIONS  (PRN):  LORazepam   Injectable 2 milliGRAM(s) IV Push every 4 hours PRN Agitation

## 2020-01-06 NOTE — PROGRESS NOTE ADULT - SUBJECTIVE AND OBJECTIVE BOX
pt seen,  examined case discussed with renal attending    SUBJECTIVE:  pt feels fine and denies cp,sob,gi or gu/uremic  symptoms    REVIEW OF SYSTEMS:  CONSTITUTIONAL: No weakness, fevers or chills  EYES/ENT: No visual changes;  No vertigo or throat pain   NECK: No pain or stiffness  RESPIRATORY: No cough, wheezing, hemoptysis; No shortness of breath  CARDIOVASCULAR: No chest pain or palpitations  GASTROINTESTINAL: No abdominal or epigastric pain. No nausea, vomiting, or hematemesis; No diarrhea or constipation. No melena or hematochezia.  GENITOURINARY: No dysuria, frequency , hematuria, flank pain or nocturia  NEUROLOGICAL: No numbness or weakness  SKIN: No itching, burning, rashes, or lesions   All other review of systems is negative unless indicated above    Current meds:    ampicillin/sulbactam  IVPB      ampicillin/sulbactam  IVPB 3 Gram(s) IV Intermittent every 6 hours  aspirin  chewable 81 milliGRAM(s) Oral daily  atorvastatin 40 milliGRAM(s) Oral at bedtime  chlorhexidine 0.12% Liquid 15 milliLiter(s) Oral Mucosa every 12 hours  chlorhexidine 4% Liquid 1 Application(s) Topical daily  folic acid 1 milliGRAM(s) Oral daily  furosemide   Injectable 40 milliGRAM(s) IV Push daily  heparin  Infusion.  Unit(s)/Hr IV Continuous <Continuous>  insulin lispro (HumaLOG) corrective regimen sliding scale   SubCutaneous every 6 hours  LORazepam   Injectable 2 milliGRAM(s) IV Push every 4 hours PRN  metoprolol tartrate 25 milliGRAM(s) Oral two times a day  multivitamin 1 Tablet(s) Oral daily  nystatin Powder 1 Application(s) Topical three times a day  pantoprazole   Suspension 40 milliGRAM(s) Enteral Tube daily  propofol Infusion 5 MICROgram(s)/kG/Min IV Continuous <Continuous>  thiamine IVPB 500 milliGRAM(s) IV Intermittent every 8 hours      Vital Signs    T(F): 99.9 (20 @ 08:00), Max: 99.9 (20 @ 08:00)  HR: 111 (20 @ 09:00) (90 - 123)  BP: 112/70 (20 @ 09:00) (84/47 - 138/82)  ABP: --  RR: 24 (20 @ 09:00) (14 - 26)  SpO2: 95% (20 @ 09:00) (92% - 97%)  Wt(kg): --  CVP(cm H2O): --  CO: --  PCWP: --    I and O's:     @ 07:01  -   @ 07:00  --------------------------------------------------------  IN:    ns in tub fed  [id number]: 30 mL    propofol Infusion: 450 mL    Sodium Chloride 0.9% IV Bolus: 2000 mL    Solution: 100 mL    Solution: 200 mL  Total IN: 2780 mL    OUT:    Indwelling Catheter - Urethral: 1045 mL  Total OUT: 1045 mL    Total NET: 1735 mL       @ 07:01  -   @ 09:33  --------------------------------------------------------  IN:    heparin  Infusion.: 48 mL    ns in tub fed  [id number]: 60 mL    propofol Infusion: 45 mL  Total IN: 153 mL    OUT:    Indwelling Catheter - Urethral: 505 mL  Total OUT: 505 mL    Total NET: -352 mL        Daily     Daily Weight in k.4 (2020 07:00)    PHYSICAL EXAM:  Constitutional: well developed, well nourished  and in nad  HEENT: PERRLA,  no icteric sclera and mild pallor of conjunctiva noted  Neck: No JVD, thyromegaly or adenopathy  Respiratory: reduced air entry lower lungs with no rales, wheezing or rhonchi  Cardiovascular: S1 and S2 normally heard  Gastrointestinal: soft, nondistended, nontender and normal bowel sounds heard  Extremities: No peripheral edema or cyanosis  Neurological: A/O x 3, no focal deficits  : No flank or cva tenderness palpated.  Skin: No rashes      LABS:    CBC:                          14.7   3.87  )-----------( 38       ( 2020 06:14 )             44.8           BMP:    01-06    137  |  102  |  24<H>  ----------------------------<  81  4.4   |  27  |  0.78  01-05    132<L>  |  91<L>  |  18  ----------------------------<  82  4.0   |  30  |  0.70  01-04    131<L>  |  94<L>  |  20<H>  ----------------------------<  98  4.1   |  30  |  0.73  01-04    127<L>  |  87<L>  |  19<H>  ----------------------------<  143<H>  4.1   |  28  |  0.88  01-04    126<L>  |  89<L>  |  16  ----------------------------<  173<H>  4.3   |  22  |  0.78  01-03    125<L>  |  86<L>  |  13  ----------------------------<  136<H>  4.1   |  30  |  0.76    Ca    9.5      2020 06:14  Ca    10.1      2020 07:14  Ca    9.8      2020 18:37  Ca    10.1      2020 06:53  Ca    10.3      2020 00:50  Ca    10.2      2020 18:42  Phos  4.1     -06  Phos  3.8     -05  Phos  2.8     -04  Mg     2.2     -06  Mg     2.0     -05  Mg     1.9     -04  Mg     1.8     -03    TPro  5.8<L>  /  Alb  2.2<L>  /  TBili  6.4<H>  /  DBili  x   /  AST  453<H>  /  ALT  389<H>  /  AlkPhos  227<H>  -  TPro  6.8  /  Alb  3.0<L>  /  TBili  5.3<H>  /  DBili  x   /  AST  447<H>  /  ALT  416<H>  /  AlkPhos  263<H>    TPro  6.5  /  Alb  2.7<L>  /  TBili  4.0<H>  /  DBili  x   /  AST  413<H>  /  ALT  399<H>  /  AlkPhos  243<H>    TPro  6.8  /  Alb  2.8<L>  /  TBili  4.3<H>  /  DBili  x   /  AST  371<H>  /  ALT  411<H>  /  AlkPhos  246<H>        Thyroid Stimulating Hormone, Serum: 1.34 uU/mL ( @ 06:53)      URINE STUDIES:                        RADIOLOGY & ADDITIONAL STUDIES:                      · Assessment	  A/P:  1.HYPONATERMIA: Hypervolemic, most likley secondary to chf/liver ds induced  -pts Na is improving slowly  -suggest to continue current care with diuretics and fluid restriction to 1000 ml per day  -f/u Na level daily  2.HYPERCALCEMIA: mild , secondary to ? immobilization vs  hypercalcemia of malignancy?  -suggest to continue Lasix   -f/u ca level daily  -suggest malignancy w/u if ca remains high  3.EDEMA:multifactorial like chf/liver ds  -avoid iv fluid  Rest as per micu team pt seen,  examined case discussed with renal attending    SUBJECTIVE:  Pt sedated and intubated    ROS:  Unable to get ROS due to mental status    Current meds:    ampicillin/sulbactam  IVPB      ampicillin/sulbactam  IVPB 3 Gram(s) IV Intermittent every 6 hours  aspirin  chewable 81 milliGRAM(s) Oral daily  atorvastatin 40 milliGRAM(s) Oral at bedtime  chlorhexidine 0.12% Liquid 15 milliLiter(s) Oral Mucosa every 12 hours  chlorhexidine 4% Liquid 1 Application(s) Topical daily  folic acid 1 milliGRAM(s) Oral daily  furosemide   Injectable 40 milliGRAM(s) IV Push daily  heparin  Infusion.  Unit(s)/Hr IV Continuous <Continuous>  insulin lispro (HumaLOG) corrective regimen sliding scale   SubCutaneous every 6 hours  LORazepam   Injectable 2 milliGRAM(s) IV Push every 4 hours PRN  metoprolol tartrate 25 milliGRAM(s) Oral two times a day  multivitamin 1 Tablet(s) Oral daily  nystatin Powder 1 Application(s) Topical three times a day  pantoprazole   Suspension 40 milliGRAM(s) Enteral Tube daily  propofol Infusion 5 MICROgram(s)/kG/Min IV Continuous <Continuous>  thiamine IVPB 500 milliGRAM(s) IV Intermittent every 8 hours      Vital Signs    T(F): 99.9 (20 @ 08:00), Max: 99.9 (20 @ 08:00)  HR: 111 (20 @ 09:00) (90 - 123)  BP: 112/70 (20 @ 09:00) (84/47 - 138/82)  ABP: --  RR: 24 (20 @ 09:00) (14 - 26)  SpO2: 95% (20 @ 09:00) (92% - 97%)  Wt(kg): --  CVP(cm H2O): --  CO: --  PCWP: --    I and O's:     @ 07:01  -   @ 07:00  --------------------------------------------------------  IN:    ns in tub fed  [id number]: 30 mL    propofol Infusion: 450 mL    Sodium Chloride 0.9% IV Bolus: 2000 mL    Solution: 100 mL    Solution: 200 mL  Total IN: 2780 mL    OUT:    Indwelling Catheter - Urethral: 1045 mL  Total OUT: 1045 mL    Total NET: 1735 mL      01-06 @ 07:01  -  01-06 @ 09:33  --------------------------------------------------------  IN:    heparin  Infusion.: 48 mL    ns in tub fed  [id number]: 60 mL    propofol Infusion: 45 mL  Total IN: 153 mL    OUT:    Indwelling Catheter - Urethral: 505 mL  Total OUT: 505 mL    Total NET: -352 mL        Daily     Daily Weight in k.4 (2020 07:00)    PHYSICAL EXAM:  Constitutional: Intubated and sedated  HEENT: PERRLA,  no icteric sclera and mild pallor of conjunctiva noted  Neck: No JVD, thyromegaly or adenopathy  Respiratory: reduced air entry lower lungs with no rales, wheezing or rhonchi  Cardiovascular: S1 and S2 normally heard  Gastrointestinal: soft, nondistended, nontender and normal bowel sounds heard  Extremities: No peripheral edema or cyanosis  Neurological: AOA X0  : No flank or cva tenderness palpated.  Skin: No rashes      LABS:    CBC:                          14.7   3.87  )-----------( 38       ( 2020 06:14 )             44.8           BMP:        137  |  102  |  24<H>  ----------------------------<  81  4.4   |  27  |  0.78  05    132<L>  |  91<L>  |  18  ----------------------------<  82  4.0   |  30  |  0.70  04    131<L>  |  94<L>  |  20<H>  ----------------------------<  98  4.1   |  30  |  0.73  04    127<L>  |  87<L>  |  19<H>  ----------------------------<  143<H>  4.1   |  28  |  0.88  04    126<L>  |  89<L>  |  16  ----------------------------<  173<H>  4.3   |  22  |  0.78  03    125<L>  |  86<L>  |  13  ----------------------------<  136<H>  4.1   |  30  |  0.76    Ca    9.5      2020 06:14  Ca    10.1      2020 07:14  Ca    9.8      2020 18:37  Ca    10.1      2020 06:53  Ca    10.3      2020 00:50  Ca    10.2      2020 18:42  Phos  4.1     -  Phos  3.8     -  Phos  2.8     -04  Mg     2.2     -06  Mg     2.0     -05  Mg     1.9     -04  Mg     1.8         TPro  5.8<L>  /  Alb  2.2<L>  /  TBili  6.4<H>  /  DBili  x   /  AST  453<H>  /  ALT  389<H>  /  AlkPhos  227<H>    TPro  6.8  /  Alb  3.0<L>  /  TBili  5.3<H>  /  DBili  x   /  AST  447<H>  /  ALT  416<H>  /  AlkPhos  263<H>    TPro  6.5  /  Alb  2.7<L>  /  TBili  4.0<H>  /  DBili  x   /  AST  413<H>  /  ALT  399<H>  /  AlkPhos  243<H>    TPro  6.8  /  Alb  2.8<L>  /  TBili  4.3<H>  /  DBili  x   /  AST  371<H>  /  ALT  411<H>  /  AlkPhos  246<H>        Thyroid Stimulating Hormone, Serum: 1.34 uU/mL ( @ 06:53)      URINE STUDIES:                        RADIOLOGY & ADDITIONAL STUDIES:                      · Assessment	  A/P:  1.HYPONATERMIA: Hypervolemic, most likley secondary to chf/liver ds induced  -pts Na is improving slowly  -suggest to continue current care with diuretics and fluid restriction to 1000 ml per day  -f/u Na level daily  2.HYPERCALCEMIA: mild , secondary to ? immobilization vs  hypercalcemia of malignancy?  -suggest to continue Lasix   -f/u ca level daily  -suggest malignancy w/u if ca remains high  3.EDEMA:multifactorial like chf/liver ds  -avoid iv fluid  Rest as per micu team pt seen,  examined case discussed with renal attending    SUBJECTIVE:  Pt sedated and intubated    ROS:  Unable to get ROS due to mental status    Current meds:    ampicillin/sulbactam  IVPB      ampicillin/sulbactam  IVPB 3 Gram(s) IV Intermittent every 6 hours  aspirin  chewable 81 milliGRAM(s) Oral daily  atorvastatin 40 milliGRAM(s) Oral at bedtime  chlorhexidine 0.12% Liquid 15 milliLiter(s) Oral Mucosa every 12 hours  chlorhexidine 4% Liquid 1 Application(s) Topical daily  folic acid 1 milliGRAM(s) Oral daily  furosemide   Injectable 40 milliGRAM(s) IV Push daily  heparin  Infusion.  Unit(s)/Hr IV Continuous <Continuous>  insulin lispro (HumaLOG) corrective regimen sliding scale   SubCutaneous every 6 hours  LORazepam   Injectable 2 milliGRAM(s) IV Push every 4 hours PRN  metoprolol tartrate 25 milliGRAM(s) Oral two times a day  multivitamin 1 Tablet(s) Oral daily  nystatin Powder 1 Application(s) Topical three times a day  pantoprazole   Suspension 40 milliGRAM(s) Enteral Tube daily  propofol Infusion 5 MICROgram(s)/kG/Min IV Continuous <Continuous>  thiamine IVPB 500 milliGRAM(s) IV Intermittent every 8 hours      Vital Signs    T(F): 99.9 (20 @ 08:00), Max: 99.9 (20 @ 08:00)  HR: 111 (20 @ 09:00) (90 - 123)  BP: 112/70 (20 @ 09:00) (84/47 - 138/82)  ABP: --  RR: 24 (20 @ 09:00) (14 - 26)  SpO2: 95% (20 @ 09:00) (92% - 97%)  Wt(kg): --  CVP(cm H2O): --  CO: --  PCWP: --    I and O's:     @ 07:01  -   @ 07:00  --------------------------------------------------------  IN:    ns in tub fed  [id number]: 30 mL    propofol Infusion: 450 mL    Sodium Chloride 0.9% IV Bolus: 2000 mL    Solution: 100 mL    Solution: 200 mL  Total IN: 2780 mL    OUT:    Indwelling Catheter - Urethral: 1045 mL  Total OUT: 1045 mL    Total NET: 1735 mL      01-06 @ 07:01  -  01-06 @ 09:33  --------------------------------------------------------  IN:    heparin  Infusion.: 48 mL    ns in tub fed  [id number]: 60 mL    propofol Infusion: 45 mL  Total IN: 153 mL    OUT:    Indwelling Catheter - Urethral: 505 mL  Total OUT: 505 mL    Total NET: -352 mL        Daily     Daily Weight in k.4 (2020 07:00)    PHYSICAL EXAM:  Constitutional: Intubated and sedated  HEENT: PERRLA, + icteric sclera and mild pallor of conjunctiva noted  Neck: No JVD  Respiratory: reduced air entry lower lungs with no rales, wheezing or rhonchi  Cardiovascular: S1 and S2 normally heard  Gastrointestinal: soft, nondistended, nontender and normal bowel sounds heard  Extremities: trace  peripheral edema present  Neurological: AOA X0  Skin: No rashes      LABS:    CBC:                          14.7   3.87  )-----------( 38       ( 2020 06:14 )             44.8           BMP:        137  |  102  |  24<H>  ----------------------------<  81  4.4   |  27  |  0.78  -05    132<L>  |  91<L>  |  18  ----------------------------<  82  4.0   |  30  |  0.70  01-04    131<L>  |  94<L>  |  20<H>  ----------------------------<  98  4.1   |  30  |  0.73  01-04    127<L>  |  87<L>  |  19<H>  ----------------------------<  143<H>  4.1   |  28  |  0.88  -04    126<L>  |  89<L>  |  16  ----------------------------<  173<H>  4.3   |  22  |  0.78  -03    125<L>  |  86<L>  |  13  ----------------------------<  136<H>  4.1   |  30  |  0.76    Ca    9.5      2020 06:14  Ca    10.1      2020 07:14  Ca    9.8      2020 18:37  Ca    10.1      2020 06:53  Ca    10.3      2020 00:50  Ca    10.2      2020 18:42  Phos  4.1     -06  Phos  3.8     -  Phos  2.8     -04  Mg     2.2     -06  Mg     2.0     -05  Mg     1.9     -04  Mg     1.8     -    TPro  5.8<L>  /  Alb  2.2<L>  /  TBili  6.4<H>  /  DBili  x   /  AST  453<H>  /  ALT  389<H>  /  AlkPhos  227<H>    TPro  6.8  /  Alb  3.0<L>  /  TBili  5.3<H>  /  DBili  x   /  AST  447<H>  /  ALT  416<H>  /  AlkPhos  263<H>    TPro  6.5  /  Alb  2.7<L>  /  TBili  4.0<H>  /  DBili  x   /  AST  413<H>  /  ALT  399<H>  /  AlkPhos  243<H>    TPro  6.8  /  Alb  2.8<L>  /  TBili  4.3<H>  /  DBili  x   /  AST  371<H>  /  ALT  411<H>  /  AlkPhos  246<H>        Thyroid Stimulating Hormone, Serum: 1.34 uU/mL ( @ 06:53)

## 2020-01-06 NOTE — DIETITIAN INITIAL EVALUATION ADULT. - PROBLEM SELECTOR PLAN 8
platelets on admission 59 - baseline unknown  likely 2/2 to sepsis  c/w management as above  f/u cbc daily

## 2020-01-06 NOTE — DIETITIAN INITIAL EVALUATION ADULT. - PROBLEM SELECTOR PLAN 1
HR on admission 112  EKG shows afib with RVR  resuming home med Pradaxa  s/p lopressor 5mg IV push x1 - rate control achieved  tele monitoring  c/w lopressor 25mg bid  f/u echo  cards penny Finnegan

## 2020-01-06 NOTE — PROGRESS NOTE ADULT - ASSESSMENT
GI following this 68 y/o male with PMHx as previously mentioned including afib (on Pradaxa), HTN and alcohol abuse s/p CT showing nodular liver with multiple lucencies which could be mets. Plts remain very low which could be seen in sepsis or cirrhosis, ITP etc. No biopsy of liver or lesions done

## 2020-01-06 NOTE — DIETITIAN INITIAL EVALUATION ADULT. - PROBLEM SELECTOR PLAN 7
Na 125 on admission  likely hypervolemic hyponatremia 2/2 to CHF  c/w diuresis as above and fluid restriction  f/u bmp in AM  f/u serum osm and urine osm  f/u urine Na

## 2020-01-06 NOTE — PROGRESS NOTE ADULT - PROBLEM SELECTOR PLAN 1
2/2  cirrhosis, Alcohol use, Congestive hepatopathy and/ or possible mets to liver.  Trend LFT's  Hep panel negative  send AFP r/o HCC

## 2020-01-06 NOTE — PROGRESS NOTE ADULT - SUBJECTIVE AND OBJECTIVE BOX
GI PROGRESS NOTE    Patient is a 69y old  Male who presents with a chief complaint of weakness/sob (05 Jan 2020 15:01)      HPI:  68 y/o male with PMHx of afib (on Pradaxa), HTN and alcohol abuse presents to the ED with chief complaint of weakness and shortness of breath. Patient is AAOx2 and a vague historian. He reports having multiple falls in the past 2 weeks. He thinks he has experienced LOC sometimes as well. He endorses left lower chest pain, described as worse with movement and pleuritic in nature. He reports lower extremitiy edema over the past few months. He also complains of left lower abdominal pain. He denies nausea or vomiting. He reports currently drinking 1 bottle of wine daily with a shot of scotch. He lives alone and says he ambulates independently.     ED course:  EKG shows afib with RVR  RLE noted to be cool to touch compared to left - bedside doppler confirmed pulses of both LE. (04 Jan 2020 00:23)          ______________________________________________________________________  PMH/PSH:  PAST MEDICAL & SURGICAL HISTORY:  HTN (hypertension)  Atrial fibrillation  No significant past surgical history    ______________________________________________________________________  MEDS:  MEDICATIONS  (STANDING):  ampicillin/sulbactam  IVPB      ampicillin/sulbactam  IVPB 3 Gram(s) IV Intermittent every 6 hours  aspirin  chewable 81 milliGRAM(s) Oral daily  atorvastatin 40 milliGRAM(s) Oral at bedtime  chlorhexidine 0.12% Liquid 15 milliLiter(s) Oral Mucosa every 12 hours  chlorhexidine 4% Liquid 1 Application(s) Topical daily  folic acid 1 milliGRAM(s) Oral daily  furosemide   Injectable 40 milliGRAM(s) IV Push daily  heparin  Infusion.  Unit(s)/Hr (24 mL/Hr) IV Continuous <Continuous>  insulin lispro (HumaLOG) corrective regimen sliding scale   SubCutaneous every 6 hours  metoprolol tartrate 25 milliGRAM(s) Oral two times a day  multivitamin 1 Tablet(s) Oral daily  nystatin Powder 1 Application(s) Topical three times a day  pantoprazole   Suspension 40 milliGRAM(s) Enteral Tube daily  propofol Infusion 5 MICROgram(s)/kG/Min (4.083 mL/Hr) IV Continuous <Continuous>  thiamine IVPB 500 milliGRAM(s) IV Intermittent every 8 hours    MEDICATIONS  (PRN):  LORazepam   Injectable 2 milliGRAM(s) IV Push every 4 hours PRN Agitation    ______________________________________________________________________  ALL:   Allergies    No Known Allergies    Intolerances      ______________________________________________________________________  SH: Social History:  Former smoker - 1ppd for 40 years; quit at age 57; drinks heavily - 1 bottle of wine daily; lives alone (04 Jan 2020 00:23)    ______________________________________________________________________  FH:  FAMILY HISTORY:  No pertinent family history in first degree relatives    ______________________________________________________________________  ROS:    CONSTITUTIONAL:  No weight loss, fever, chills, weakness or fatigue.    HEENT:  Eyes:  No visual loss, blurred vision, double vision or yellow sclerae. Ears, Nose, Throat:  No hearing loss, sneezing, congestion, runny nose or sore throat.    SKIN:  No rash or itching.    CARDIOVASCULAR:  No chest pain, chest pressure or chest discomfort. No palpitations or edema.    RESPIRATORY:  No shortness of breath, cough or sputum.    GASTROINTESTINAL:  SEE HPI    GENITOURINARY:  No dysuria, hematuria, urinary frequency    NEUROLOGICAL:  No headache, dizziness, syncope, paralysis, ataxia, numbness or tingling in the extremities. No change in bowel or bladder control.    MUSCULOSKELETAL:  No muscle, back pain, joint pain or stiffness.    HEMATOLOGIC:  No anemia, bleeding or bruising.    LYMPHATICS:  No enlarged nodes. No history of splenectomy.    PSYCHIATRIC:  No history of depression or anxiety.    ENDOCRINOLOGIC:  No reports of sweating, cold or heat intolerance. No polyuria or polydipsia.    ALLERGIES:  No history of asthma, hives, eczema or rhinitis.  ______________________________________________________________________  PHYSICAL EXAM:  T(C): 37.7 (01-06-20 @ 08:00), Max: 37.7 (01-06-20 @ 08:00)  HR: 109 (01-06-20 @ 10:00)  BP: 94/67 (01-06-20 @ 10:00)  RR: 22 (01-06-20 @ 10:00)  SpO2: 95% (01-06-20 @ 10:00)  Wt(kg): --    01-05 - 01-06  --------------------------------------------------------  IN:    ns in tub fed  [id number]: 30 mL    propofol Infusion: 450 mL    Sodium Chloride 0.9% IV Bolus: 2000 mL    Solution: 100 mL    Solution: 200 mL  Total IN: 2780 mL    OUT:    Indwelling Catheter - Urethral: 1045 mL  Total OUT: 1045 mL    Total NET: 1735 mL      01-06 - 01-06  --------------------------------------------------------  IN:    heparin  Infusion.: 72 mL    ns in tub fed  [id number]: 120 mL    propofol Infusion: 67.5 mL  Total IN: 259.5 mL    OUT:    Indwelling Catheter - Urethral: 590 mL  Total OUT: 590 mL    Total NET: -330.5 mL          GEN: NAD, intubated   CVS- S1 S2  ABD: soft nontender, non distended, bowel sounds+  LUNGS: clear to auscultation  NEURO: sedated/intubated   Extremities: no cyanosis, no calf tenderness, no edema, no clubbing      ______________________________________________________________________  LABS:                        14.7   3.87  )-----------( 38       ( 06 Jan 2020 06:14 )             44.8     01-06    137  |  102  |  24<H>  ----------------------------<  81  4.4   |  27  |  0.78    Ca    9.5      06 Jan 2020 06:14  Phos  4.1     01-06  Mg     2.2     01-06    TPro  5.8<L>  /  Alb  2.2<L>  /  TBili  6.4<H>  /  DBili  x   /  AST  453<H>  /  ALT  389<H>  /  AlkPhos  227<H>  01-06    LIVER FUNCTIONS - ( 06 Jan 2020 06:14 )  Alb: 2.2 g/dL / Pro: 5.8 g/dL / ALK PHOS: 227 U/L / ALT: 389 U/L DA / AST: 453 U/L / GGT: x           ______________________________________________________________________  IMAGING:    ______________________________________________________________________  ASSESSMENT:  69y Male    PLAN:

## 2020-01-06 NOTE — PROGRESS NOTE ADULT - SUBJECTIVE AND OBJECTIVE BOX
INTERVAL HPI/OVERNIGHT EVENTS: Pt overnight oxygen saturation were dropping so FIO2 was increased from  40 - 50%    PRESSORS: [ ] YES [ x] NO  WHICH:    ANTIBIOTICS:                  DATE STARTED:  ANTIBIOTICS:                  DATE STARTED:  ANTIBIOTICS:                  DATE STARTED:    Antimicrobial:  ampicillin/sulbactam  IVPB      ampicillin/sulbactam  IVPB 3 Gram(s) IV Intermittent every 6 hours    Cardiovascular:  furosemide   Injectable 40 milliGRAM(s) IV Push daily  metoprolol tartrate 25 milliGRAM(s) Oral two times a day    Pulmonary:    Hematalogic:  aspirin  chewable 81 milliGRAM(s) Oral daily  heparin  Infusion.  Unit(s)/Hr IV Continuous <Continuous>    Other:  atorvastatin 40 milliGRAM(s) Oral at bedtime  chlorhexidine 0.12% Liquid 15 milliLiter(s) Oral Mucosa every 12 hours  chlorhexidine 4% Liquid 1 Application(s) Topical daily  folic acid 1 milliGRAM(s) Oral daily  insulin lispro (HumaLOG) corrective regimen sliding scale   SubCutaneous every 6 hours  LORazepam   Injectable 2 milliGRAM(s) IV Push every 4 hours PRN  multivitamin 1 Tablet(s) Oral daily  nystatin Powder 1 Application(s) Topical three times a day  pantoprazole   Suspension 40 milliGRAM(s) Enteral Tube daily  propofol Infusion 5 MICROgram(s)/kG/Min IV Continuous <Continuous>  thiamine IVPB 500 milliGRAM(s) IV Intermittent every 8 hours    ampicillin/sulbactam  IVPB      ampicillin/sulbactam  IVPB 3 Gram(s) IV Intermittent every 6 hours  aspirin  chewable 81 milliGRAM(s) Oral daily  atorvastatin 40 milliGRAM(s) Oral at bedtime  chlorhexidine 0.12% Liquid 15 milliLiter(s) Oral Mucosa every 12 hours  chlorhexidine 4% Liquid 1 Application(s) Topical daily  folic acid 1 milliGRAM(s) Oral daily  furosemide   Injectable 40 milliGRAM(s) IV Push daily  heparin  Infusion.  Unit(s)/Hr IV Continuous <Continuous>  insulin lispro (HumaLOG) corrective regimen sliding scale   SubCutaneous every 6 hours  LORazepam   Injectable 2 milliGRAM(s) IV Push every 4 hours PRN  metoprolol tartrate 25 milliGRAM(s) Oral two times a day  multivitamin 1 Tablet(s) Oral daily  nystatin Powder 1 Application(s) Topical three times a day  pantoprazole   Suspension 40 milliGRAM(s) Enteral Tube daily  propofol Infusion 5 MICROgram(s)/kG/Min IV Continuous <Continuous>  thiamine IVPB 500 milliGRAM(s) IV Intermittent every 8 hours    Drug Dosing Weight  Height (cm): 180.34 (03 Jan 2020 15:39)  Weight (kg): 125 (05 Jan 2020 11:30)  BMI (kg/m2): 38.4 (05 Jan 2020 11:30)  BSA (m2): 2.42 (05 Jan 2020 11:30)    CENTRAL LINE: [ ] YES [ ] NO  LOCATION:         VILLATORO: [ ] YES [ ] NO          A-LINE:  [ ] YES [ ] NO  LOCATION:             ICU Vital Signs Last 24 Hrs  T(C): 37.9 (06 Jan 2020 12:00), Max: 37.9 (06 Jan 2020 12:00)  T(F): 100.2 (06 Jan 2020 12:00), Max: 100.2 (06 Jan 2020 12:00)  HR: 122 (06 Jan 2020 12:00) (90 - 123)  BP: 110/60 (06 Jan 2020 12:00) (90/56 - 113/66)  BP(mean): 71 (06 Jan 2020 12:00) (63 - 80)  ABP: --  ABP(mean): --  RR: 15 (06 Jan 2020 12:00) (14 - 26)  SpO2: 95% (06 Jan 2020 12:00) (92% - 97%)      ABG - ( 06 Jan 2020 03:48 )  pH, Arterial: 7.40  pH, Blood: x     /  pCO2: 52    /  pO2: 90    / HCO3: 32    / Base Excess: 5.8   /  SaO2: 96                    01-05 @ 07:01  -  01-06 @ 07:00  --------------------------------------------------------  IN: 2780 mL / OUT: 1045 mL / NET: 1735 mL        Mode: AC/ CMV (Assist Control/ Continuous Mandatory Ventilation)  RR (machine): 14  TV (machine): 500  FiO2: 50  PEEP: 5  ITime: 1  MAP: 9.4  PIP: 22      REVIEW OF SYSTEMS:    could not be obtained as pt is Intubated      PHYSICAL EXAM:    GENERAL: NAD, sedated for Intbation   EYES: EOMI, PERRLA,   NECK: Supple, No JVD; Normal thyroid; Trachea midline  NERVOUS SYSTEM: sedated for intubation  CHEST/LUNG: decreased b/l basal breath sounds   HEART: Regular rate and rhythm; No murmurs,   ABDOMEN: Soft, Nontender, Nondistended; Bowel sounds present  EXTREMITIES:  2+ Peripheral Pulses, No clubbing, cyanosis, or edema        LABS:  CBC Full  -  ( 06 Jan 2020 06:14 )  WBC Count : 3.87 K/uL  RBC Count : 4.50 M/uL  Hemoglobin : 14.7 g/dL  Hematocrit : 44.8 %  Platelet Count - Automated : 38 K/uL  Mean Cell Volume : 99.6 fl  Mean Cell Hemoglobin : 32.7 pg  Mean Cell Hemoglobin Concentration : 32.8 gm/dL  Auto Neutrophil # : 3.29 K/uL  Auto Lymphocyte # : 0.39 K/uL  Auto Monocyte # : 0.08 K/uL  Auto Eosinophil # : 0.00 K/uL  Auto Basophil # : 0.04 K/uL  Auto Neutrophil % : 82.0 %  Auto Lymphocyte % : 10.0 %  Auto Monocyte % : 2.0 %  Auto Eosinophil % : 0.0 %  Auto Basophil % : 1.0 %    01-06    137  |  102  |  24<H>  ----------------------------<  81  4.4   |  27  |  0.78    Ca    9.5      06 Jan 2020 06:14  Phos  4.1     01-06  Mg     2.2     01-06    TPro  5.8<L>  /  Alb  2.2<L>  /  TBili  6.4<H>  /  DBili  x   /  AST  453<H>  /  ALT  389<H>  /  AlkPhos  227<H>  01-06        Culture Results:   >=3 organisms. Probable collection contamination. (01-04 @ 10:10)  Culture Results:   No growth to date. (01-04 @ 02:39)  Culture Results:   No growth to date. (01-04 @ 02:39)      RADIOLOGY & ADDITIONAL STUDIES REVIEWED:        [ ]GOALS OF CARE DISCUSSION WITH PATIENT/FAMILY/PROXY:  < from: Xray Chest 1 View-PORTABLE IMMEDIATE (01.05.20 @ 12:21) >  IMPRESSION:    ET tube in satisfactory position. NG tube tip is below the diaphragm and below the bottom of the image.    Right upper lobe infiltrate. Cannot rule out underlying mass. Recommend chest CT.    < end of copied text >    CRITICAL CARE TIME SPENT: 35 minutes

## 2020-01-06 NOTE — DIETITIAN INITIAL EVALUATION ADULT. - PROBLEM SELECTOR PLAN 5
lactate on admission 4.3 - source unclear  avoid aggressive fluids given patient is volume overloaded  bedside doppler of LE confirms perfusion of LE bilaterally  will pursue CTA abdomen w/ IV contrast to r/o bowel ischemia  f/u repeat lactate

## 2020-01-06 NOTE — DIETITIAN INITIAL EVALUATION ADULT. - PROBLEM SELECTOR PLAN 4
patient meets sepsis criteria with HR>100, lactate 4.3, RR 22  not strongly convinced this is of infectious etiology, but given acuity of illness at this time and CT finding of abdominal soft tissue swelling, will treat for cellulitis  WBC count wnl, afebrile  starting unasyn  f/u blood cx x2  f/u UA and urine cx  f/u flu swab

## 2020-01-06 NOTE — PROGRESS NOTE ADULT - ASSESSMENT
Assessment	  A/P:  1.HYPONATERMIA: Hypervolemic, most likley secondary to chf/liver ds induced  -Na improved to within normal limits. Will continue to monitor   -suggest to continue current care with diuretics and fluid restriction to 1000 ml per day  -f/u Na level daily  2.HYPERCALCEMIA: mild , secondary to ? immobilization vs  hypercalcemia of malignancy?  -suggest to continue Lasix   -f/u ca level daily  -suggest malignancy w/u if ca remains high  3.EDEMA:multifactorial like chf/liver ds  -avoid iv fluid  Rest as per micu team A/P:  1.HYPONATERMIA: Hypervolemic, most likley secondary to chf/liver ds induced  -Na improved to within normal limits. Will continue to monitor   -suggest to continue current care with diuretics and fluid restriction to 1000 ml per day  -f/u Na level daily  2.HYPERCALCEMIA: improving   -suggest to continue Lasix   -f/u ca level daily  -suggest malignancy w/u if ca remains high  3.EDEMA:multifactorial like chf/liver ds  -avoid iv fluid  Rest as per micu team A/P:  1.HYPONATERMIA: Hypervolemic, most likley secondary to chf/liver ds induced  -Na improved to within normal limits. Will continue to monitor   -suggest to continue current care with diuretics and fluid restriction to 1000 ml per day  -f/u Na level daily  2.HYPERCALCEMIA: improving   -suggest to continue Lasix   -f/u ca level daily  -suggest malignancy w/u if ca remains high  3.EDEMA:multifactorial like chf/liver ds  -avoid iv fluid  Will sign off now, please reconsult as needed  Rest as per micu team

## 2020-01-06 NOTE — DIETITIAN INITIAL EVALUATION ADULT. - PERTINENT LABORATORY DATA
01-06 Na137 mmol/L Glu 81 mg/dL K+ 4.4 mmol/L Cr  0.78 mg/dL BUN 24 mg/dL<H> 01-06 Phos 4.1 mg/dL 01-06 Alb 2.2 g/dL<L> 01-04 [id number] 6.5 %<H> 01-04 Chol 208 mg/dL<H>  mg/dL HDL 60 mg/dL Trig 131 mg/dL

## 2020-01-06 NOTE — DIETITIAN INITIAL EVALUATION ADULT. - ENTERAL
Jevity 1.5 35 ml/hr x24 hrs + 1 Pkt Prosource BID (840 ml, 1260 kcals, 54 gm protein, 638 ml free water. 2 Prosource add 30 gm protein, 120 kcals. (with Propofol at ~22.5 ml/hr)

## 2020-01-06 NOTE — DIETITIAN INITIAL EVALUATION ADULT. - OTHER INFO
Pt seen in AM. TF was infusing @ 40 ml/hr. Propofol @ 22.5 ml/hr (if x 24 hrs+540 ml, 594 kcals fat). Discussed briefly w/ RN and CCM. Pt noted have 2+ pedal edema on admission, acute HF, ETOH abuse/ AMS)

## 2020-01-06 NOTE — PROGRESS NOTE ADULT - PROBLEM SELECTOR PLAN 2
f/u AFP  Trend LFT's  Consider CT biopsy of liver lesions.   Continue diuresis.   Start lactulose and Xifaxan if encephalopathic f/u AFP  Trend LFT's  Consider CT biopsy of liver lesions and MRI when stable.   Continue diuresis.   Start lactulose and Xifaxan if encephalopathic

## 2020-01-06 NOTE — DIETITIAN INITIAL EVALUATION ADULT. - PROBLEM SELECTOR PLAN 3
/  on admission  tbili 4.3 on admission  etiology unclear at this time  f/u cmp daily  f/u hepatitis panel  CT abdomen shows now CBD dilatation, but does show hypodense lesions  f/u CEA level  f/u dedicated US abdomen to assess RUQ  GI consulted - Dr. Martel

## 2020-01-06 NOTE — DIETITIAN INITIAL EVALUATION ADULT. - PROBLEM SELECTOR PLAN 2
proBNP on admission 1006 with evidence of fluid overload  s/s highly suggestive of heart failure which patient denies hx of  diurese with lasix 40mg IV push daily  troponin 0.125 on admission - likely demand due to afib with rvr  f/u t2 and t3  starting asa, statin and bblocker  strict I/Os  daily weights  f/u echo  tele monitoring  cards eval - Dr. Sivan awan consulted for possible KIRSTIN component for dyspnea - Dr. Mccallum

## 2020-01-06 NOTE — PROGRESS NOTE ADULT - SUBJECTIVE AND OBJECTIVE BOX
INTERVAL HPI/OVERNIGHT EVENTS:       Antimicrobial:  ampicillin/sulbactam  IVPB      ampicillin/sulbactam  IVPB 3 Gram(s) IV Intermittent every 6 hours    Cardiovascular:  furosemide   Injectable 40 milliGRAM(s) IV Push daily  metoprolol tartrate 25 milliGRAM(s) Oral two times a day    Pulmonary:    Hematalogic:  aspirin  chewable 81 milliGRAM(s) Oral daily  heparin  Infusion.  Unit(s)/Hr IV Continuous <Continuous>    Other:  atorvastatin 40 milliGRAM(s) Oral at bedtime  chlorhexidine 0.12% Liquid 15 milliLiter(s) Oral Mucosa every 12 hours  chlorhexidine 4% Liquid 1 Application(s) Topical daily  folic acid 1 milliGRAM(s) Oral daily  insulin lispro (HumaLOG) corrective regimen sliding scale   SubCutaneous every 6 hours  LORazepam   Injectable 2 milliGRAM(s) IV Push every 4 hours PRN  multivitamin 1 Tablet(s) Oral daily  nystatin Powder 1 Application(s) Topical three times a day  pantoprazole   Suspension 40 milliGRAM(s) Enteral Tube daily  propofol Infusion 5 MICROgram(s)/kG/Min IV Continuous <Continuous>  thiamine IVPB 500 milliGRAM(s) IV Intermittent every 8 hours      Drug Dosing Weight  Height (cm): 180.34 (03 Jan 2020 15:39)  Weight (kg): 125 (05 Jan 2020 11:30)  BMI (kg/m2): 38.4 (05 Jan 2020 11:30)  BSA (m2): 2.42 (05 Jan 2020 11:30)    CENTRAL LINE: [ ] YES [ ] NO  LOCATION:   DATE INSERTED:    VILLATORO: [x ] YES [ ] NO    DATE INSERTED:    A-LINE:  [ ] YES [ ] NO  LOCATION:   DATE INSERTED:    [facility]/Social Hx/[facility] Hx -reviewed admission note, no change since admission  PAST MEDICAL & SURGICAL HISTORY:  HTN (hypertension)  Atrial fibrillation  No significant past surgical history      T(C): 37.9 (01-06-20 @ 12:00), Max: 37.9 (01-06-20 @ 12:00)  HR: 104 (01-06-20 @ 13:00)  BP: 99/65 (01-06-20 @ 13:00)  BP(mean): 73 (01-06-20 @ 13:00)  ABP: --  ABP(mean): --  RR: 20 (01-06-20 @ 13:00)  SpO2: 93% (01-06-20 @ 13:00)  Wt(kg): --    ABG - ( 06 Jan 2020 03:48 )  pH, Arterial: 7.40  pH, Blood: x     /  pCO2: 52    /  pO2: 90    / HCO3: 32    / Base Excess: 5.8   /  SaO2: 96                    01-05 @ 07:01  -  01-06 @ 07:00  --------------------------------------------------------  IN: 2780 mL / OUT: 1045 mL / NET: 1735 mL        Mode: AC/ CMV (Assist Control/ Continuous Mandatory Ventilation)  RR (machine): 14  TV (machine): 500  FiO2: 50  PEEP: 5  ITime: 1  MAP: 9.4  PIP: 22      PHYSICAL EXAM:    GENERAL: No signs of distress, comfortable  HEAD: Atraumatic, Normocephalic  EYES: EOMI, PERRLA  ENMT: No erythema, exudates, or enlargement, Moist mucous membranes +ETT  NECK: Supple, normal appearance, No JVD; [  ] central line (if applicable)  CHEST/LUNG: No chest deformity, fair bilateral air entry; No rales, rhonchi, wheezing; crackles  HEART: Regular rate and rhythm; No murmurs, rubs, or gallops;   ABDOMEN: Soft, Nontender, Nondistended; Bowel sounds present  EXTREMITIES:  + Peripheral Pulses, No clubbing, cyanosis, or edema  NERVOUS SYSTEM: sedated  LYMPH: No lymphadenopathy noted  SKIN: No rashes or lesions; good turgor, warm, dry      LABS:  CBC Full  -  ( 06 Jan 2020 06:14 )  WBC Count : 3.87 K/uL  RBC Count : 4.50 M/uL  Hemoglobin : 14.7 g/dL  Hematocrit : 44.8 %  Platelet Count - Automated : 38 K/uL  Mean Cell Volume : 99.6 fl  Mean Cell Hemoglobin : 32.7 pg  Mean Cell Hemoglobin Concentration : 32.8 gm/dL  Auto Neutrophil # : 3.29 K/uL  Auto Lymphocyte # : 0.39 K/uL  Auto Monocyte # : 0.08 K/uL  Auto Eosinophil # : 0.00 K/uL  Auto Basophil # : 0.04 K/uL  Auto Neutrophil % : 82.0 %  Auto Lymphocyte % : 10.0 %  Auto Monocyte % : 2.0 %  Auto Eosinophil % : 0.0 %  Auto Basophil % : 1.0 %    01-06    137  |  102  |  24<H>  ----------------------------<  81  4.4   |  27  |  0.78    Ca    9.5      06 Jan 2020 06:14  Phos  4.1     01-06  Mg     2.2     01-06    TPro  5.8<L>  /  Alb  2.2<L>  /  TBili  6.4<H>  /  DBili  x   /  AST  453<H>  /  ALT  389<H>  /  AlkPhos  227<H>  01-06        Culture Results:   >=3 organisms. Probable collection contamination. (01-04 @ 10:10)  Culture Results:   No growth to date. (01-04 @ 02:39)  Culture Results:   No growth to date. (01-04 @ 02:39)      RADIOLOGY & ADDITIONAL STUDIES REVIEWED     CXR:< from: Xray Chest 1 View-PORTABLE IMMEDIATE (01.05.20 @ 12:21) >    EXAM:  XR CHEST PORTABLE IMMED 1V                            PROCEDURE DATE:  01/05/2020          INTERPRETATION:  CLINICAL INDICATION: 69 years  Male with s/p intubation.    COMPARISON: 1/3/2020    The tip of the ET tube is at the clavicular headsthe tip of the NG tube is below the bottom of the image.    AP view of the chest demonstrates a right upper lobe infiltrate. Cannot rule out underlying mass. The left lung is clear. There is no pleural effusion. There is no pneumothorax.    The heartis normal in size. There is no mediastinal or hilar mass.     The pulmonary vasculature is normal.     Mild thoracic degenerative changes are present.    IMPRESSION:    ET tube in satisfactory position. NG tube tip is below the diaphragm and below the bottom of the image.    Right upper lobe infiltrate. Cannot rule out underlying mass. Recommend chest CT.                LUCIA VILLARREAL M.D., ATTENDING RADIOLOGIST  This document has been electronically signed. Jan 5 2020  3:32PM                < end of copied text >      IMPRESSION:  PAST MEDICAL & SURGICAL HISTORY:  HTN (hypertension)  Atrial fibrillation  No significant past surgical history   p/w       IMP: This is a 69 yr old man with  afib (on Pradaxa), HTN and alcohol abuse presents to the ED with chief complaint of weakness and shortness of breath. Patient is initially admitted to medical floor for CHF exacerbation, afib with RVR, alcohol withdrawal and sepsis 2/2 cellulitis. Patient is AAOx2-3 at baseline, noted lethargic by nurse this morning. Patient is on CIWA protocol and received total 8 mg of ativan Iv push in last 24 hours. RRT was called for respiratory distress and lethargy, patient is minimally arousable to sternal rub, not able to follow commands.           Plan:     Neuro:   keep sedated to - 2 RASS  banana bag      Resp:  Acute hypercapnic respiratory failure:   likely from combination of CHF and medication induced form ativan   continue vent support    CVS:   # Afib:   history of Afib   rate controlled with metoprolol, on pradexa for anticoagulation   will hold pradexa for now   will consider starting heparin drip     CHF:  admitted with CHF exacerbation   on lasix 40 mg IV push daily   will continue lasix for now   stict I/O and daily weight   follow echo    GI:   NGT feed  start on TF after NG placement     # Transaminitis:   due to etoh use   monitor  GI consulted - Dr. Martel.    Heme:  # Thrombocytopenia.    Platelets on admission 59 -> 55 -> 44  likely 2/2 to sepsis  trend daily CBC     Endo:   #DM:   BS coverage    ID:  # cellulitis:   abdominal cellulitis on CT A/p   on antibx - continue     Renal:   no acute issues     PPx:   was on pradexa - holding   protonix IV for GI  prophylaxis          GOALS OF CARE DISCUSSION WITH PATIENT/FAMILY/PROXY/ icu team on rounds    CRITICAL CARE TIME SPENT: 39 minutes INTERVAL HPI/OVERNIGHT EVENTS:       Antimicrobial:  ampicillin/sulbactam  IVPB      ampicillin/sulbactam  IVPB 3 Gram(s) IV Intermittent every 6 hours    Cardiovascular:  furosemide   Injectable 40 milliGRAM(s) IV Push daily  metoprolol tartrate 25 milliGRAM(s) Oral two times a day    Pulmonary:    Hematalogic:  aspirin  chewable 81 milliGRAM(s) Oral daily  heparin  Infusion.  Unit(s)/Hr IV Continuous <Continuous>    Other:  atorvastatin 40 milliGRAM(s) Oral at bedtime  chlorhexidine 0.12% Liquid 15 milliLiter(s) Oral Mucosa every 12 hours  chlorhexidine 4% Liquid 1 Application(s) Topical daily  folic acid 1 milliGRAM(s) Oral daily  insulin lispro (HumaLOG) corrective regimen sliding scale   SubCutaneous every 6 hours  LORazepam   Injectable 2 milliGRAM(s) IV Push every 4 hours PRN  multivitamin 1 Tablet(s) Oral daily  nystatin Powder 1 Application(s) Topical three times a day  pantoprazole   Suspension 40 milliGRAM(s) Enteral Tube daily  propofol Infusion 5 MICROgram(s)/kG/Min IV Continuous <Continuous>  thiamine IVPB 500 milliGRAM(s) IV Intermittent every 8 hours      Drug Dosing Weight  Height (cm): 180.34 (03 Jan 2020 15:39)  Weight (kg): 125 (05 Jan 2020 11:30)  BMI (kg/m2): 38.4 (05 Jan 2020 11:30)  BSA (m2): 2.42 (05 Jan 2020 11:30)    CENTRAL LINE: [ ] YES [ ] NO  LOCATION:   DATE INSERTED:    VILLATORO: [x ] YES [ ] NO    DATE INSERTED:    A-LINE:  [ ] YES [ ] NO  LOCATION:   DATE INSERTED:    [facility]/Social Hx/[facility] Hx -reviewed admission note, no change since admission  PAST MEDICAL & SURGICAL HISTORY:  HTN (hypertension)  Atrial fibrillation  No significant past surgical history      T(C): 37.9 (01-06-20 @ 12:00), Max: 37.9 (01-06-20 @ 12:00)  HR: 104 (01-06-20 @ 13:00)  BP: 99/65 (01-06-20 @ 13:00)  BP(mean): 73 (01-06-20 @ 13:00)  ABP: --  ABP(mean): --  RR: 20 (01-06-20 @ 13:00)  SpO2: 93% (01-06-20 @ 13:00)  Wt(kg): --    ABG - ( 06 Jan 2020 03:48 )  pH, Arterial: 7.40  pH, Blood: x     /  pCO2: 52    /  pO2: 90    / HCO3: 32    / Base Excess: 5.8   /  SaO2: 96                    01-05 @ 07:01  -  01-06 @ 07:00  --------------------------------------------------------  IN: 2780 mL / OUT: 1045 mL / NET: 1735 mL        Mode: AC/ CMV (Assist Control/ Continuous Mandatory Ventilation)  RR (machine): 14  TV (machine): 500  FiO2: 50  PEEP: 5  ITime: 1  MAP: 9.4  PIP: 22      PHYSICAL EXAM:    GENERAL: No signs of distress, comfortable  HEAD: Atraumatic, Normocephalic  EYES: EOMI, PERRLA  ENMT: No erythema, exudates, or enlargement, Moist mucous membranes +ETT  NECK: Supple, normal appearance, No JVD; [  ] central line (if applicable)  CHEST/LUNG: No chest deformity, fair bilateral air entry; No rales, rhonchi, wheezing; crackles  HEART: Regular rate and rhythm; No murmurs, rubs, or gallops;   ABDOMEN: Soft, Nontender, Nondistended; Bowel sounds present  EXTREMITIES:  + Peripheral Pulses, No clubbing, cyanosis, or edema  NERVOUS SYSTEM: sedated  LYMPH: No lymphadenopathy noted  SKIN: No rashes or lesions; good turgor, warm, dry      LABS:  CBC Full  -  ( 06 Jan 2020 06:14 )  WBC Count : 3.87 K/uL  RBC Count : 4.50 M/uL  Hemoglobin : 14.7 g/dL  Hematocrit : 44.8 %  Platelet Count - Automated : 38 K/uL  Mean Cell Volume : 99.6 fl  Mean Cell Hemoglobin : 32.7 pg  Mean Cell Hemoglobin Concentration : 32.8 gm/dL  Auto Neutrophil # : 3.29 K/uL  Auto Lymphocyte # : 0.39 K/uL  Auto Monocyte # : 0.08 K/uL  Auto Eosinophil # : 0.00 K/uL  Auto Basophil # : 0.04 K/uL  Auto Neutrophil % : 82.0 %  Auto Lymphocyte % : 10.0 %  Auto Monocyte % : 2.0 %  Auto Eosinophil % : 0.0 %  Auto Basophil % : 1.0 %    01-06    137  |  102  |  24<H>  ----------------------------<  81  4.4   |  27  |  0.78    Ca    9.5      06 Jan 2020 06:14  Phos  4.1     01-06  Mg     2.2     01-06    TPro  5.8<L>  /  Alb  2.2<L>  /  TBili  6.4<H>  /  DBili  x   /  AST  453<H>  /  ALT  389<H>  /  AlkPhos  227<H>  01-06        Culture Results:   >=3 organisms. Probable collection contamination. (01-04 @ 10:10)  Culture Results:   No growth to date. (01-04 @ 02:39)  Culture Results:   No growth to date. (01-04 @ 02:39)      RADIOLOGY & ADDITIONAL STUDIES REVIEWED     CXR:< from: Xray Chest 1 View-PORTABLE IMMEDIATE (01.05.20 @ 12:21) >    EXAM:  XR CHEST PORTABLE IMMED 1V                            PROCEDURE DATE:  01/05/2020          INTERPRETATION:  CLINICAL INDICATION: 69 years  Male with s/p intubation.    COMPARISON: 1/3/2020    The tip of the ET tube is at the clavicular headsthe tip of the NG tube is below the bottom of the image.    AP view of the chest demonstrates a right upper lobe infiltrate. Cannot rule out underlying mass. The left lung is clear. There is no pleural effusion. There is no pneumothorax.    The heartis normal in size. There is no mediastinal or hilar mass.     The pulmonary vasculature is normal.     Mild thoracic degenerative changes are present.    IMPRESSION:    ET tube in satisfactory position. NG tube tip is below the diaphragm and below the bottom of the image.    Right upper lobe infiltrate. Cannot rule out underlying mass. Recommend chest CT.                LUCIA VILLARREAL M.D., ATTENDING RADIOLOGIST  This document has been electronically signed. Jan 5 2020  3:32PM                < end of copied text >      IMPRESSION:  PAST MEDICAL & SURGICAL HISTORY:  HTN (hypertension)  Atrial fibrillation  No significant past surgical history   p/w       IMP: This is a 69 yr old man with  afib (on Pradaxa), HTN and alcohol abuse presents to the ED with chief complaint of weakness and shortness of breath. Patient is initially admitted to medical floor for CHF exacerbation, afib with RVR, alcohol withdrawal and sepsis 2/2 cellulitis. Patient is AAOx2-3 at baseline, noted lethargic by nurse this morning. Patient is on CIWA protocol and received total 8 mg of ativan Iv push in last 24 hours. RRT was called for respiratory distress and lethargy, patient is minimally arousable to sternal rub, not able to follow commands.  + ASP PNA on antibx          Plan:     Neuro:   keep sedated to - 2 RASS  banana bag      Resp:  Acute hypercapnic respiratory failure:  PNA  likely from combination of CHF and medication induced form ativan   continue vent support    CVS:   # Afib:   history of Afib   rate controlled with metoprolol, on pradexa for anticoagulation   will hold pradexa for now   will consider starting heparin drip     CHF:  admitted with CHF exacerbation   on lasix 40 mg IV push daily   will continue lasix for now   stict I/O and daily weight   follow echo    GI:   NGT feed  start on TF after NG placement     # Transaminitis:   due to etoh use   monitor  GI consulted - Dr. Martel.    Heme:  # Thrombocytopenia.    Platelets on admission 59 -> 55 -> 44  likely 2/2 to sepsis  trend daily CBC     Endo:   #DM:   BS coverage    ID:  # cellulitis:   abdominal cellulitis on CT A/p   on antibx - continue     Renal:   no acute issues     PPx:   was on pradexa - holding   protonix IV for GI  prophylaxis          GOALS OF CARE DISCUSSION WITH PATIENT/FAMILY/PROXY/ icu team on rounds    CRITICAL CARE TIME SPENT: 39 minutes

## 2020-01-06 NOTE — PROGRESS NOTE ADULT - ASSESSMENT
70 y/o male with PMHx of afib (on Pradaxa), HTN and alcohol abuse presents to the ED with chief complaint of weakness and shortness of breath. Patient is initially admitted to medical floor for CHF exacerbation, afib with RVR, alcohol withdrawal and sepsis 2/2 cellulitis. Patient is AAOx2-3 at baseline, noted lethargic by nurse this morning. Patient is on CIWA protocol and received total 8 mg of ativan Iv push in last 24 hours. RRT was called for respiratory distress and lethargy, patient is minimally arousable to sternal rub, not able to follow commands.     Plan:     Neuro:   AAOx2-3 at baseline   noted lethargic   likely combination form hypercarbia and medication induced (on ativan as per CIWA)  will admit to ICU - will need intubation and vent     Respi:   Acute hypercapnic respiratory failure:   likely from combination of CHF and medication induced form ativan   AB.38/55/99 on ventimask   lethargic + using accessory muscles   admit to ICU - will likely need intubation and vent support   follow post intubation chest xray and ABG     CVS:   # Afib:   history of Afib   rate controlled with metoprolol, on pradexa for anticoagulation   will hold pradexa for now   will consider starting heparin drip     CHF:  admitted with CHF exacerbation   on lasix 40 mg IV push daily   will continue lasix for now   stict I/O and daily weight   follow echo    GI:   NPO for now  start on TF after NG placement     # Transaminitis:    /  on admission -stable   tbili 4.3 on admission  etiology unclear at this time  hepatitis panel - negative   CT abdomen shows now CBD dilatation, but does show hypodense lesions  f/u CEA level  f/u dedicated US abdomen to assess RUQ  GI consulted - Dr. Martel.  Heme:  # Thrombocytopenia.    Platelets on admission 59 -> 55 -> 44  likely 2/2 to sepsis  trend daily CBC     Endo:   #DM:   HbA1c of 6.5  sliding scale q6 while NPO     ID:  # cellulitis:   abdominal cellulitis on CT A/p   on amoxicilliln - continue     Renal:   no acute issues     PPx:   was on pradexa - holding   protonix IV for GI  prophylaxis

## 2020-01-07 NOTE — PHYSICAL THERAPY INITIAL EVALUATION ADULT - MANUAL MUSCLE TESTING RESULTS, REHAB EVAL
inability to participate in a formal MMT, currently sedated; observed to occasionally move both arms and legs spontaneously against gravity/not tested due to

## 2020-01-07 NOTE — PHYSICAL THERAPY INITIAL EVALUATION ADULT - PATIENT/FAMILY/SIGNIFICANT OTHER GOALS STATEMENT, PT EVAL
unable to state goals for self at this time; no family at bedside; as per H&P, patient reports he previously lives alone and ambulates independently

## 2020-01-07 NOTE — PROGRESS NOTE ADULT - PROBLEM SELECTOR PLAN 1
likely 2/2 cirrhosis, Alcohol use, Congestive hepatopathy and/ or possible mets to liver.  Trend LFT's  Hep panel negative  Consider CT biopsy of liver lesions and MRI when stable.   Continue diuresis.   Start lactulose and Xifaxan if encephalopathic.   recommend send AFP r/o HCC.

## 2020-01-07 NOTE — PHYSICAL THERAPY INITIAL EVALUATION ADULT - DIAGNOSIS, PT EVAL
immobility; at risk for ICU-acquired weakness; inability to perform basic mobility and functional activities; inability to perform sitting or standing activities

## 2020-01-07 NOTE — PROGRESS NOTE ADULT - ASSESSMENT
GI following this 68 y/o male with PMHx as previously mentioned including afib (on Pradaxa), HTN and alcohol abuse s/p CT showing nodular liver with multiple lucencies which could be mets. Plts remain very low which could be seen in sepsis or cirrhosis, ITP etc. No biopsy of liver or lesions done. Patient remains intubated and sedated in the ICU.

## 2020-01-07 NOTE — PROGRESS NOTE ADULT - ASSESSMENT
70 y/o male with PMHx of afib (on Pradaxa), HTN and alcohol abuse presents to the ED with chief complaint of weakness and shortness of breath. Patient is initially admitted to medical floor for CHF exacerbation, afib with RVR, alcohol withdrawal and sepsis 2/2 cellulitis. Patient is AAOx2-3 at baseline, noted lethargic by nurse this morning. Patient is on CIWA protocol and received total 8 mg of ativan Iv push in last 24 hours. RRT was called for respiratory distress and lethargy, patient is minimally arousable to sternal rub, not able to follow commands.     Plan:     Neuro:   AAOx2-3 at baseline   likely combination form hypercarbia and medication induced (on ativan as per CIWA)  Intubated in ICU on      Respi:   Acute hypercapnic respiratory failure:   likely from combination of CHF and medication induced form ativan   AB.38/55/99 on ventimask   lethargic + using accessory muscles   Started on ETT on . Fio2 increased to 50%    CVS:   # Afib:   history of Afib   rate controlled with metoprolol, on pradexa for anticoagulation   will hold pradexa for now   on heparin drip    CHF:  admitted with CHF exacerbation   on lasix 40 mg IV push daily    stict I/O and daily weight   echo- EF - 55%    GI:   NPO for now  pt pulled the Ng tube. Placed on a new NG tube    # Transaminitis:    /  on admission -stable   tbili 4.3 on admission  etiology unclear at this time  hepatitis panel - negative   CT abdomen shows now CBD dilatation, but does show hypodense lesions  USG abd- Contracted bladder  GI consulted - Dr. Martel.    Heme:  # Thrombocytopenia.    Platelets on admission 59 -> 55 -> 44  likely 2/2 to sepsis  trend daily CBC     Endo:   #DM:   HbA1c of 6.5  sliding scale q6 while NPO     ID:  # cellulitis:   abdominal cellulitis on CT A/p   on amoxicilliln - continue     Renal:   no acute issues     PPx:   was on pradexa - holding   protonix IV for GI  prophylaxis

## 2020-01-07 NOTE — PHYSICAL THERAPY INITIAL EVALUATION ADULT - PERTINENT HX OF CURRENT PROBLEM, REHAB EVAL
weakness and shortness of breath; patient reports multiple falls in past 2 weeks as per H&P; alcohol withdrawal

## 2020-01-07 NOTE — PHYSICAL THERAPY INITIAL EVALUATION ADULT - IMPAIRMENTS FOUND, PT EVAL
ventilation and respiration/gas exchange/tone/poor safety awareness/posture/arousal, attention, and cognition/decreased midline orientation/sensory integrity/gait, locomotion, and balance/aerobic capacity/endurance/gross motor/cognitive impairment/muscle strength

## 2020-01-07 NOTE — PROGRESS NOTE ADULT - SUBJECTIVE AND OBJECTIVE BOX
INTERVAL HPI/OVERNIGHT EVENTS:  pt spo2 dropped to 85% on ventilator. Increased fio2- 50%    PRESSORS: [ ] YES [ x] NO  WHICH:    ANTIBIOTICS:                  DATE STARTED:  ANTIBIOTICS:                  DATE STARTED:  ANTIBIOTICS:                  DATE STARTED:    Antimicrobial:  ampicillin/sulbactam  IVPB      ampicillin/sulbactam  IVPB 3 Gram(s) IV Intermittent every 6 hours    Cardiovascular:  metoprolol tartrate 25 milliGRAM(s) Oral two times a day    Pulmonary:    Hematalogic:  aspirin  chewable 81 milliGRAM(s) Oral daily  heparin  Infusion.  Unit(s)/Hr IV Continuous <Continuous>    Other:  atorvastatin 40 milliGRAM(s) Oral at bedtime  chlorhexidine 0.12% Liquid 15 milliLiter(s) Oral Mucosa every 12 hours  chlorhexidine 4% Liquid 1 Application(s) Topical daily  dexMEDEtomidine Infusion 0.2 MICROgram(s)/kG/Hr IV Continuous <Continuous>  folic acid 1 milliGRAM(s) Oral daily  insulin lispro (HumaLOG) corrective regimen sliding scale   SubCutaneous every 6 hours  LORazepam   Injectable 2 milliGRAM(s) IV Push every 4 hours PRN  multivitamin 1 Tablet(s) Oral daily  nystatin Powder 1 Application(s) Topical three times a day  pantoprazole   Suspension 40 milliGRAM(s) Enteral Tube daily  propofol Infusion 30 MICROgram(s)/kG/Min IV Continuous <Continuous>    ampicillin/sulbactam  IVPB      ampicillin/sulbactam  IVPB 3 Gram(s) IV Intermittent every 6 hours  aspirin  chewable 81 milliGRAM(s) Oral daily  atorvastatin 40 milliGRAM(s) Oral at bedtime  chlorhexidine 0.12% Liquid 15 milliLiter(s) Oral Mucosa every 12 hours  chlorhexidine 4% Liquid 1 Application(s) Topical daily  dexMEDEtomidine Infusion 0.2 MICROgram(s)/kG/Hr IV Continuous <Continuous>  folic acid 1 milliGRAM(s) Oral daily  heparin  Infusion.  Unit(s)/Hr IV Continuous <Continuous>  insulin lispro (HumaLOG) corrective regimen sliding scale   SubCutaneous every 6 hours  LORazepam   Injectable 2 milliGRAM(s) IV Push every 4 hours PRN  metoprolol tartrate 25 milliGRAM(s) Oral two times a day  multivitamin 1 Tablet(s) Oral daily  nystatin Powder 1 Application(s) Topical three times a day  pantoprazole   Suspension 40 milliGRAM(s) Enteral Tube daily  propofol Infusion 30 MICROgram(s)/kG/Min IV Continuous <Continuous>    Drug Dosing Weight  Height (cm): 180.34 (03 Jan 2020 15:39)  Weight (kg): 125 (05 Jan 2020 11:30)  BMI (kg/m2): 38.4 (05 Jan 2020 11:30)  BSA (m2): 2.42 (05 Jan 2020 11:30)    CENTRAL LINE: [ ] YES [ ] NO  LOCATION:         VILLATORO: [x ] YES [ ] NO          A-LINE:  [ ] YES [ ] NO  LOCATION:             ICU Vital Signs Last 24 Hrs  T(C): 37.6 (07 Jan 2020 08:00), Max: 37.6 (07 Jan 2020 08:00)  T(F): 99.7 (07 Jan 2020 08:00), Max: 99.7 (07 Jan 2020 08:00)  HR: 105 (07 Jan 2020 11:59) (94 - 128)  BP: 119/72 (07 Jan 2020 11:59) (92/60 - 127/77)  BP(mean): 83 (07 Jan 2020 11:59) (60 - 87)  ABP: --  ABP(mean): --  RR: 19 (07 Jan 2020 11:59) (8 - 27)  SpO2: 99% (07 Jan 2020 11:59) (91% - 100%)      ABG - ( 06 Jan 2020 03:48 )  pH, Arterial: 7.40  pH, Blood: x     /  pCO2: 52    /  pO2: 90    / HCO3: 32    / Base Excess: 5.8   /  SaO2: 96                    01-06 @ 07:01  -  01-07 @ 07:00  --------------------------------------------------------  IN: 2585 mL / OUT: 1749 mL / NET: 836 mL        Mode: AC/ CMV (Assist Control/ Continuous Mandatory Ventilation)  RR (machine): 14  TV (machine): 500  FiO2: 50  PEEP: 5  ITime: 1  MAP: 11  PIP: 24      REVIEW OF SYSTEMS:    could not be obtained as Pt is on Intubation    PHYSICAL EXAM:    GENERAL: obese man, sedated for intubation  EYES: EOMI, PERRLA,   NECK: Supple, No JVD; Normal thyroid; Trachea midline  NERVOUS SYSTEM:  sedated for Intubation  CHEST/LUNG: No rales, rhonchi, wheezing   HEART: Regular rate and rhythm; No murmurs,   ABDOMEN: Soft, Nontender, Nondistended; Bowel sounds present  EXTREMITIES:  2+ Peripheral Pulses, No clubbing, cyanosis, or edema        LABS:  CBC Full  -  ( 07 Jan 2020 04:34 )  WBC Count : 4.32 K/uL  RBC Count : 4.81 M/uL  Hemoglobin : 15.7 g/dL  Hematocrit : 47.2 %  Platelet Count - Automated : 44 K/uL  Mean Cell Volume : 98.1 fl  Mean Cell Hemoglobin : 32.6 pg  Mean Cell Hemoglobin Concentration : 33.3 gm/dL  Auto Neutrophil # : x  Auto Lymphocyte # : x  Auto Monocyte # : x  Auto Eosinophil # : x  Auto Basophil # : x  Auto Neutrophil % : x  Auto Lymphocyte % : x  Auto Monocyte % : x  Auto Eosinophil % : x  Auto Basophil % : x    01-07    139  |  98  |  30<H>  ----------------------------<  152<H>  3.7   |  32<H>  |  0.83    Ca    10.5      07 Jan 2020 04:34  Phos  3.0     01-07  Mg     2.3     01-07    TPro  5.8<L>  /  Alb  2.2<L>  /  TBili  6.4<H>  /  DBili  x   /  AST  453<H>  /  ALT  389<H>  /  AlkPhos  227<H>  01-06    PTT - ( 07 Jan 2020 11:30 )  PTT:113.8 sec        RADIOLOGY & ADDITIONAL STUDIES REVIEWED:  < from: CT Abdomen and Pelvis w/ IV Cont (01.03.20 @ 20:04) >    IMPRESSION:     No acute pathology. No diverticulitis or colitis.    Vague low-attenuation lesions throughout the liver with nodular contour. While findings may be related to cirrhosis, there are no ancillary findings of cirrhosis or portal hypertension. Diffuse metastatic disease to the liver could have a similar appearance. Follow-up MRI abdomen with and without contrast.    Soft tissue edema lower abdominal wall. Correlate for cellulitis.    < end of copied text >      [ ]GOALS OF CARE DISCUSSION WITH PATIENT/FAMILY/PROXY:    CRITICAL CARE TIME SPENT: 35 minutes

## 2020-01-07 NOTE — PHYSICAL THERAPY INITIAL EVALUATION ADULT - IMPAIRMENTS CONTRIBUTING IMPAIRED BED MOBILITY, REHAB EVAL
decreased strength/impaired coordination/impaired motor control/abnormal muscle tone/impaired postural control/cognition

## 2020-01-07 NOTE — PHYSICAL THERAPY INITIAL EVALUATION ADULT - ADDITIONAL COMMENTS
patient reports he ambulates independently as per H&P, currently unknown if patient uses an assistive device for mobility

## 2020-01-07 NOTE — PROGRESS NOTE ADULT - SUBJECTIVE AND OBJECTIVE BOX
GI PROGRESS NOTE    Patient is a 69y old  Male who presents with a chief complaint of SOB (06 Jan 2020 13:15)      HPI:  68 y/o male with PMHx of afib (on Pradaxa), HTN and alcohol abuse presents to the ED with chief complaint of weakness and shortness of breath. Patient is AAOx2 and a vague historian. He reports having multiple falls in the past 2 weeks. He thinks he has experienced LOC sometimes as well. He endorses left lower chest pain, described as worse with movement and pleuritic in nature. He reports lower extremitiy edema over the past few months. He also complains of left lower abdominal pain. He denies nausea or vomiting. He reports currently drinking 1 bottle of wine daily with a shot of scotch. He lives alone and says he ambulates independently.     ED course:  EKG shows afib with RVR  RLE noted to be cool to touch compared to left - bedside doppler confirmed pulses of both LE. (04 Jan 2020 00:23)          ______________________________________________________________________  PMH/PSH:  PAST MEDICAL & SURGICAL HISTORY:  HTN (hypertension)  Atrial fibrillation  No significant past surgical history    ______________________________________________________________________  MEDS:  MEDICATIONS  (STANDING):  ampicillin/sulbactam  IVPB      ampicillin/sulbactam  IVPB 3 Gram(s) IV Intermittent every 6 hours  aspirin  chewable 81 milliGRAM(s) Oral daily  atorvastatin 40 milliGRAM(s) Oral at bedtime  chlorhexidine 0.12% Liquid 15 milliLiter(s) Oral Mucosa every 12 hours  chlorhexidine 4% Liquid 1 Application(s) Topical daily  dexMEDEtomidine Infusion 0.2 MICROgram(s)/kG/Hr (6.25 mL/Hr) IV Continuous <Continuous>  folic acid 1 milliGRAM(s) Oral daily  heparin  Infusion.  Unit(s)/Hr (24 mL/Hr) IV Continuous <Continuous>  insulin lispro (HumaLOG) corrective regimen sliding scale   SubCutaneous every 6 hours  metoprolol tartrate 25 milliGRAM(s) Oral two times a day  multivitamin 1 Tablet(s) Oral daily  nystatin Powder 1 Application(s) Topical three times a day  pantoprazole   Suspension 40 milliGRAM(s) Enteral Tube daily  propofol Infusion 5.444 MICROgram(s)/kG/Min (4.083 mL/Hr) IV Continuous <Continuous>    MEDICATIONS  (PRN):  LORazepam   Injectable 2 milliGRAM(s) IV Push every 4 hours PRN Agitation    ______________________________________________________________________  ALL:   Allergies    No Known Allergies    Intolerances      ______________________________________________________________________  SH: Social History:  Former smoker - 1ppd for 40 years; quit at age 57; drinks heavily - 1 bottle of wine daily; lives alone (04 Jan 2020 00:23)    ______________________________________________________________________  FH:  FAMILY HISTORY:  No pertinent family history in first degree relatives    ______________________________________________________________________  ROS:    CONSTITUTIONAL:  No weight loss, fever, chills, weakness or fatigue.    HEENT:  Eyes:  No visual loss, blurred vision, double vision or yellow sclerae. Ears, Nose, Throat:  No hearing loss, sneezing, congestion, runny nose or sore throat.    SKIN:  No rash or itching.    CARDIOVASCULAR:  No chest pain, chest pressure or chest discomfort. No palpitations or edema.    RESPIRATORY:  No shortness of breath, cough or sputum.    GASTROINTESTINAL:  SEE HPI    GENITOURINARY:  No dysuria, hematuria, urinary frequency    NEUROLOGICAL:  No headache, dizziness, syncope, paralysis, ataxia, numbness or tingling in the extremities. No change in bowel or bladder control.    MUSCULOSKELETAL:  No muscle, back pain, joint pain or stiffness.    HEMATOLOGIC:  No anemia, bleeding or bruising.    LYMPHATICS:  No enlarged nodes. No history of splenectomy.    PSYCHIATRIC:  No history of depression or anxiety.    ENDOCRINOLOGIC:  No reports of sweating, cold or heat intolerance. No polyuria or polydipsia.    ALLERGIES:  No history of asthma, hives, eczema or rhinitis.  ______________________________________________________________________  PHYSICAL EXAM:  T(C): 37.6 (01-07-20 @ 08:00), Max: 37.9 (01-06-20 @ 12:00)  HR: 115 (01-07-20 @ 10:00)  BP: 106/60 (01-07-20 @ 10:00)  RR: 15 (01-07-20 @ 10:00)  SpO2: 98% (01-07-20 @ 10:00)  Wt(kg): --    01-06 - 01-07  --------------------------------------------------------  IN:    Enteral Tube Flush: 200 mL    heparin  Infusion.: 520 mL    ns in tub fed  [id number]: 840 mL    propofol Infusion: 367.5 mL    propofol Infusion: 157.5 mL    Solution: 300 mL    Solution: 200 mL  Total IN: 2585 mL    OUT:    Indwelling Catheter - Urethral: 1749 mL  Total OUT: 1749 mL    Total NET: 836 mL      01-07 - 01-07  --------------------------------------------------------  IN:    heparin  Infusion.: 54 mL    ns in tub fed  [id number]: 140 mL    propofol Infusion: 67.5 mL  Total IN: 261.5 mL    OUT:    Indwelling Catheter - Urethral: 235 mL  Total OUT: 235 mL    Total NET: 26.5 mL          GEN: sedated  CVS- S1 S2  ABD: soft nontender, non distended, bowel sounds+  LUNGS: clear to auscultation; intubated  NEURO: noin focal neuro exam; AAO x 3  Extremities: no cyanosis, no calf tenderness, no edema, no clubbing      ______________________________________________________________________  LABS:                        15.7   4.32  )-----------( 44       ( 07 Jan 2020 04:34 )             47.2     01-07    139  |  98  |  30<H>  ----------------------------<  152<H>  3.7   |  32<H>  |  0.83    Ca    10.5      07 Jan 2020 04:34  Phos  3.0     01-07  Mg     2.3     01-07    TPro  5.8<L>  /  Alb  2.2<L>  /  TBili  6.4<H>  /  DBili  x   /  AST  453<H>  /  ALT  389<H>  /  AlkPhos  227<H>  01-06    LIVER FUNCTIONS - ( 06 Jan 2020 06:14 )  Alb: 2.2 g/dL / Pro: 5.8 g/dL / ALK PHOS: 227 U/L / ALT: 389 U/L DA / AST: 453 U/L / GGT: x           ______________________________________________________________________

## 2020-01-07 NOTE — PHYSICAL THERAPY INITIAL EVALUATION ADULT - GENERAL OBSERVATIONS, REHAB EVAL
sedated, RASS -4; +peripheral IV access on both forearms; +ETT, on MV 40/5; +indwelling urethral catheter; distended abdomen; severe swelling and pitting edema on abdomen and both lower extremities

## 2020-01-07 NOTE — PROGRESS NOTE ADULT - SUBJECTIVE AND OBJECTIVE BOX
INTERVAL HPI/OVERNIGHT EVENTS:       Antimicrobial:  ampicillin/sulbactam  IVPB      ampicillin/sulbactam  IVPB 3 Gram(s) IV Intermittent every 6 hours    Cardiovascular:  metoprolol tartrate 25 milliGRAM(s) Oral two times a day    Pulmonary:    Hematalogic:  aspirin  chewable 81 milliGRAM(s) Oral daily  heparin  Infusion.  Unit(s)/Hr IV Continuous <Continuous>    Other:  atorvastatin 40 milliGRAM(s) Oral at bedtime  chlorhexidine 0.12% Liquid 15 milliLiter(s) Oral Mucosa every 12 hours  chlorhexidine 4% Liquid 1 Application(s) Topical daily  dexMEDEtomidine Infusion 0.2 MICROgram(s)/kG/Hr IV Continuous <Continuous>  folic acid 1 milliGRAM(s) Oral daily  insulin lispro (HumaLOG) corrective regimen sliding scale   SubCutaneous every 6 hours  LORazepam   Injectable 2 milliGRAM(s) IV Push every 4 hours PRN  multivitamin 1 Tablet(s) Oral daily  nystatin Powder 1 Application(s) Topical three times a day  pantoprazole   Suspension 40 milliGRAM(s) Enteral Tube daily  propofol Infusion 30 MICROgram(s)/kG/Min IV Continuous <Continuous>      Drug Dosing Weight  Height (cm): 180.34 (2020 15:39)  Weight (kg): 125 (2020 11:30)  BMI (kg/m2): 38.4 (2020 11:30)  BSA (m2): 2.42 (2020 11:30)    CENTRAL LINE: [ ] YES [ ] NO  LOCATION:   DATE INSERTED:    VILLATORO: [ ] YES [ ] NO    DATE INSERTED:    A-LINE:  [ ] YES [ ] NO  LOCATION:   DATE INSERTED:    PMH/Social Hx/[facility] Hx -reviewed admission note, no change since admission  PAST MEDICAL & SURGICAL HISTORY:  HTN (hypertension)  Atrial fibrillation  No significant past surgical history      T(C): 37.6 (20 @ 08:00), Max: 37.6 (20 @ 08:00)  HR: 121 (20 @ 12:00)  BP: 122/72 (20 @ 12:00)  BP(mean): 83 (20 @ 12:00)  ABP: --  ABP(mean): --  RR: 23 (20 @ 12:00)  SpO2: 95% (20 @ 12:00)  Wt(kg): --    ABG - ( 2020 03:48 )  pH, Arterial: 7.40  pH, Blood: x     /  pCO2: 52    /  pO2: 90    / HCO3: 32    / Base Excess: 5.8   /  SaO2: 96                     @ 07:01  -   @ 07:00  --------------------------------------------------------  IN: 2585 mL / OUT: 1749 mL / NET: 836 mL        Mode: AC/ CMV (Assist Control/ Continuous Mandatory Ventilation)  RR (machine): 14  TV (machine): 500  FiO2: 40  PEEP: 5  ITime: 1  MAP: 11  PIP: 24      PHYSICAL EXAM:    GENERAL: No signs of distress, comfortable  HEAD: Atraumatic, Normocephalic  EYES: EOMI, PERRLA  ENMT: No erythema, exudates, or enlargement, Moist mucous membranes +ETT  NECK: Supple, normal appearance, No JVD; [  ] central line (if applicable)  CHEST/LUNG: No chest deformity, fair bilateral air entry; No rales, rhonchi, wheezing; crackles  HEART: Regular rate and rhythm; No murmurs, rubs, or gallops;   ABDOMEN: Soft, Nontender, Nondistended; Bowel sounds present  EXTREMITIES:  + Peripheral Pulses, No clubbing, cyanosis, or edema  NERVOUS SYSTEM: open eyes to voices, on sedation  LYMPH: No lymphadenopathy noted  SKIN: No rashes or lesions; good turgor, warm, dry      LABS:  CBC Full  -  ( 2020 04:34 )  WBC Count : 4.32 K/uL  RBC Count : 4.81 M/uL  Hemoglobin : 15.7 g/dL  Hematocrit : 47.2 %  Platelet Count - Automated : 44 K/uL  Mean Cell Volume : 98.1 fl  Mean Cell Hemoglobin : 32.6 pg  Mean Cell Hemoglobin Concentration : 33.3 gm/dL  Auto Neutrophil # : x  Auto Lymphocyte # : x  Auto Monocyte # : x  Auto Eosinophil # : x  Auto Basophil # : x  Auto Neutrophil % : x  Auto Lymphocyte % : x  Auto Monocyte % : x  Auto Eosinophil % : x  Auto Basophil % : x        139  |  98  |  30<H>  ----------------------------<  152<H>  3.7   |  32<H>  |  0.83    Ca    10.5      2020 04:34  Phos  3.0       Mg     2.3         TPro  5.8<L>  /  Alb  2.2<L>  /  TBili  6.4<H>  /  DBili  x   /  AST  453<H>  /  ALT  389<H>  /  AlkPhos  227<H>      PTT - ( 2020 11:30 )  PTT:113.8 sec        RADIOLOGY & ADDITIONAL STUDIES REVIEWED     CXR:< from: Xray Chest 1 View- PORTABLE-Routine (20 @ 11:24) >    EXAM:  XR CHEST PORTABLE ROUTINE 1V                            PROCEDURE DATE:  2020          INTERPRETATION:  AP semierect chest on 2020 at 10:49 AM. Patient requires intubation.    Heart is likely enlarged.    Endotracheal tube and nasogastric tube remain.    On  there was a significant infiltrate in the right upper lung field. This shows increased consolidation.    IMPRESSION: Increasing consolidation right upper lobe.                DIANA REEVES M.D., ATTENDING RADIOLOGIST  This document has been electronically signed. 2020  2:13PM                < end of copied text >      IMPRESSION:  PAST MEDICAL & SURGICAL HISTORY:  HTN (hypertension)  Atrial fibrillation  No significant past surgical history   p/w       IMP: This is a 69 yr old man with  afib (on Pradaxa), HTN and alcohol abuse presents to the ED with chief complaint of weakness and shortness of breath. Patient is initially admitted to medical floor for CHF exacerbation, afib with RVR, alcohol withdrawal and sepsis 2/2 cellulitis. Patient is AAOx2-3 at baseline, noted lethargic by nurse this morning. Patient is on CIWA protocol and received total 8 mg of ativan Iv push in last 24 hours. RRT was called for respiratory distress and lethargy, patient is minimally arousable to sternal rub, not able to follow commands.  + ASP PNA on antibx. . intubated           Plan:    Neuro:   AAOx2-3 at baseline   likely combination form hypercarbia and medication induced (on ativan as per CIWA)  Intubated in ICU on      Respi:   Acute hypercapnic respiratory failure:   likely from combination of CHF and medication induced form ativan   AB.38/55/99 on ventimask   lethargic + using accessory muscles   Started on ETT on . Fio2 increased to 50%    CVS:   # Afib:   history of Afib   rate controlled with metoprolol, on pradexa for anticoagulation   will hold pradexa for now   on heparin drip    CHF:  admitted with CHF exacerbation   on lasix 40 mg IV push daily    stict I/O and daily weight   echo- EF - 55%    GI:   NPO for now  pt pulled the Ng tube. Placed on a new NG tube    # Transaminitis:    /  on admission -stable   tbili 4.3 on admission  etiology unclear at this time  hepatitis panel - negative   CT abdomen shows now CBD dilatation, but does show hypodense lesions  USG abd- Contracted bladder  GI consulted - Dr. Martel.    Heme:  # Thrombocytopenia.    Platelets on admission 59 -> 55 -> 44  likely 2/2 to sepsis  trend daily CBC     Endo:   #DM:   HbA1c of 6.5  sliding scale q6 while NPO     ID:  # cellulitis:   abdominal cellulitis on CT A/p   on amoxicilliln - continue     Renal:   no acute issues     PPx:   was on pradexa - holding   protonix IV for GI  prophylaxis            GOALS OF CARE DISCUSSION WITH PATIENT/FAMILY/PROXY/ icu team on rounds     CRITICAL CARE TIME SPENT: 35 minutes

## 2020-01-08 NOTE — PROGRESS NOTE ADULT - ASSESSMENT
68 y/o male with PMHx of afib (on Pradaxa), HTN and alcohol abuse presents to the ED with chief complaint of weakness and shortness of breath. Patient is initially admitted to medical floor for CHF exacerbation, afib with RVR, alcohol withdrawal and sepsis 2/2 cellulitis. Patient is AAOx2-3 at baseline, noted lethargic by nurse this morning. Patient is on CIWA protocol and received total 8 mg of ativan Iv push in last 24 hours. RRT was called for respiratory distress and lethargy, patient is minimally arousable to sternal rub, not able to follow commands.     Plan:     Neuro:   AAOx2-3 at baseline   likely combination form hypercarbia and medication induced (on ativan as per CIWA)  On Ativan PRN   Intubated in ICU on 1/5     Respi:   Acute hypercapnic respiratory failure:   likely from combination of CHF and medication induced form ativan   ABG - 1/6 - PH 7.42  Started on ETT on 1/5. Fio2 increased to 50%    CVS:   # Afib:   history of Afib   rate controlled with metoprolol, on pradexa for anticoagulation   will hold pradexa for now   held Heparin drip from Thrombocytopenia.    CHF:  admitted with CHF exacerbation   d/c lasix   stict I/O and daily weight   echo- EF - 55%    GI:   Tube fed  pt pulled the Ng tube. Placed on a new NG tube    # Transaminitis:    /  on admission -stable   tbili 4.3 on admission  etiology unclear at this time  hepatitis panel - negative   CT abdomen shows now CBD dilatation, but does show hypodense lesions  USG abd- Contracted bladder  GI consulted - Dr. Martel.    Heme:  # Thrombocytopenia.    Platelets on admission 59 -> 55 -> 44->48  likely 2/2 to sepsis  trend daily CBC     Endo:   #DM:   HbA1c of 6.5  sliding scale q6 while NPO     ID:  # cellulitis:   Fever- 100F today   abdominal cellulitis on CT A/p   on amoxicilliln - continue     Renal:   no acute issues     PPx:   was on pradexa - holding   protonix IV for GI  prophylaxis

## 2020-01-08 NOTE — PROGRESS NOTE ADULT - ATTENDING COMMENTS
Patient seen and examined with resident, Addendum to above.    69 yr old man with afib (on Pradaxa), HTN and alcohol abuse presents to the ED with chief complaint of weakness and shortness of breath. Patient is initially admitted to medical floor for CHF exacerbation, afib with RVR, alcohol withdrawal.  RRT was called for respiratory distress and lethargy, patient was minimally arousable to sternal rub, not able to follow commands.  + ASP PNA on antibx. . intubated 1/5. Examined this morning, lower dose of sedation. Requiring PRN ativan continuing on precedex.     Assessment;  1. Acute respiratory failure  2. Alcohol withdrawal syndrome   3. Atrial fibrillation   4. Heart failure with preserved EF   5. Thrombocytopenia   6. Cirrhosis     - Cont. mechanical ventilation   - Daily awakening and weaning trials   - Monitor for signs of withdrawal  - Cont. Precedex and Ativan prn per CIWA  - Thiamine and Folate supplementation   - Start tube feeds today   - Cont. antibiotics for presumed aspiration pneumonia   - Monitor for signs of bleeding, holding heparin given thrombocytopenia   - Appears euvolemic, antihypertensives held due to initial hypotension   - Overall prognosis is guarded at this time  - DVT prophylaxis with SCD, and stress ulcer prophylaxis

## 2020-01-08 NOTE — PROGRESS NOTE ADULT - ASSESSMENT
GI following this 70 y/o male with PMHx as previously mentioned including afib (on Pradaxa), HTN and alcohol abuse s/p CT showing nodular liver with multiple lucencies which could be mets. Plts remain very low which could be seen in sepsis or cirrhosis, ITP etc. No biopsy of liver or lesions done. Patient remains intubated and sedated in the ICU.

## 2020-01-08 NOTE — CHART NOTE - NSCHARTNOTEFT_GEN_A_CORE
Assessment:   Patient is a 69y old  Male who presents with a chief complaint of SOB and Weakness. Pt off Propofol. RN reports TF out to replaced. TF not in progress. RN reports was told when TF started last night pt had multiple soft stool. Pt continues intubated.   Afib RVR (2020 13:48)      Factors impacting intake: [ ] none [ ] nausea  [ ] vomiting [ ] diarrhea [ ] constipation  [ ]chewing problems [ ] swallowing issues  [x ] other: intubated, "altered GI fx"    Diet Prescription: Diet, NPO with Tube Feed:   Tube Feeding Modality: Nasogastric  Jevity 1.5 Hu  Total Volume for 24 Hours (mL): 840  Continuous  Starting Tube Feed Rate {mL per Hour}: 10  Increase Tube Feed Rate by (mL): 10     Every 6 hours  Until Goal Tube Feed Rate (mL per Hour): 35  Tube Feed Duration (in Hours): 24  Tube Feed Start Time: 07:00 (20 @ 06:58)    Intake:     Daily Height in cm: 180.34 (2020 15:39)      Daily Weight in k.8 (2020 07:15)  Weight in k.4 (2020 07:00)  Weight in k (2020 14:20)  Weight in k.4 (2020 07:00)    % Weight Change: I>O    Pertinent Medications: MEDICATIONS  (STANDING):  ampicillin/sulbactam  IVPB      ampicillin/sulbactam  IVPB 3 Gram(s) IV Intermittent every 6 hours  aspirin  chewable 81 milliGRAM(s) Oral daily  atorvastatin 40 milliGRAM(s) Oral at bedtime  chlorhexidine 0.12% Liquid 15 milliLiter(s) Oral Mucosa every 12 hours  chlorhexidine 4% Liquid 1 Application(s) Topical daily  dexMEDEtomidine Infusion 0.7 MICROgram(s)/kG/Hr (21.875 mL/Hr) IV Continuous <Continuous>  folic acid 1 milliGRAM(s) Oral daily  insulin lispro (HumaLOG) corrective regimen sliding scale   SubCutaneous every 6 hours  LORazepam   Injectable 2 milliGRAM(s) IV Push every 2 hours  metoprolol tartrate 25 milliGRAM(s) Oral two times a day  multivitamin 1 Tablet(s) Oral daily  nystatin Powder 1 Application(s) Topical three times a day  pantoprazole   Suspension 40 milliGRAM(s) Enteral Tube daily  propofol Infusion 30 MICROgram(s)/kG/Min (22.5 mL/Hr) IV Continuous <Continuous>  thiamine IVPB 500 milliGRAM(s) IV Intermittent daily    MEDICATIONS  (PRN):  LORazepam   Injectable 2 milliGRAM(s) IV Push every 4 hours PRN Agitation    Pertinent Labs:  Na144 mmol/L Glu 119 mg/dL<H> K+ 3.9 mmol/L Cr  1.04 mg/dL BUN 43 mg/dL<H>  Phos 3.8 mg/dL  Alb 2.2 g/dL<L>  [id number] 6.5 %<H>  Chol 208 mg/dL<H>  mg/dL HDL 60 mg/dL Trig 131 mg/dL     CAPILLARY BLOOD GLUCOSE      POCT Blood Glucose.: 107 mg/dL (2020 13:40)  POCT Blood Glucose.: 102 mg/dL (2020 06:03)  POCT Blood Glucose.: 122 mg/dL (2020 00:09)  POCT Blood Glucose.: 125 mg/dL (2020 17:58)    Skin:     Estimated Needs:   [ ] no change since previous assessment  [ ] recalculated:       Previous Nutrition Diagnosis:   [ ] Altered GI function  [ ]Inadequate Oral Intake [x ] Swallowing Difficulty   [ ] Altered nutrition related labs [ ] Increased Nutrient Needs [ ] Overweight/Obesity   [ ] Unintended Weight Loss [ ] Food & Nutrition Related Knowledge Deficit [ ] Malnutrition   [ ] Other:     Nutrition Diagnosis is [x ] ongoing  [ ] resolved [ ] not applicable     New Nutrition Diagnosis: [ ] not applicable       Interventions:   Recommend  [ ] Change Diet To:  [ ] Nutrition Supplement  [x ] Nutrition Support: Consider Glucerna 1.5 start 10-20 ml/hr increasing by 10 ml/hr each 12-24 hrs until an initial goal rate of 45x24 (Glucerna 1.5 1080 ml, 1620 kcals, 89 gm protein, 821 ml free water. If diarrhea (if undesired such as not on lactulose, etc).), consider adding banana flakes TID. If diarrhea persists can try Vital AF 1.2. MD to monitor. RD available.   [ ] Other:     Monitoring and Evaluation:    [ x ] Tolerance to diet prescription [ x ] weights [ x ] labs[ x ] follow up per protocol  [ ] other:

## 2020-01-08 NOTE — PROGRESS NOTE ADULT - SUBJECTIVE AND OBJECTIVE BOX
Time of Visit:  Patient seen and examined.     MEDICATIONS  (STANDING):  ampicillin/sulbactam  IVPB      ampicillin/sulbactam  IVPB 3 Gram(s) IV Intermittent every 6 hours  aspirin  chewable 81 milliGRAM(s) Oral daily  atorvastatin 40 milliGRAM(s) Oral at bedtime  chlorhexidine 0.12% Liquid 15 milliLiter(s) Oral Mucosa every 12 hours  chlorhexidine 4% Liquid 1 Application(s) Topical daily  dexMEDEtomidine Infusion 0.7 MICROgram(s)/kG/Hr (21.875 mL/Hr) IV Continuous <Continuous>  folic acid 1 milliGRAM(s) Oral daily  insulin lispro (HumaLOG) corrective regimen sliding scale   SubCutaneous every 6 hours  LORazepam   Injectable 2 milliGRAM(s) IV Push every 2 hours  metoprolol tartrate 25 milliGRAM(s) Oral two times a day  multivitamin 1 Tablet(s) Oral daily  nystatin Powder 1 Application(s) Topical three times a day  pantoprazole   Suspension 40 milliGRAM(s) Enteral Tube daily  thiamine IVPB 500 milliGRAM(s) IV Intermittent daily      MEDICATIONS  (PRN):  LORazepam   Injectable 2 milliGRAM(s) IV Push every 4 hours PRN Agitation       Medications up to date at time of exam.      PHYSICAL EXAMINATION:  Patient has no new complaints.  GENERAL: The patient is a well-developed, well-nourished, in no apparent distress.     Vital Signs Last 24 Hrs  T(C): 38.3 (08 Jan 2020 19:00), Max: 38.9 (08 Jan 2020 17:00)  T(F): 101 (08 Jan 2020 19:00), Max: 102 (08 Jan 2020 17:00)  HR: 93 (08 Jan 2020 19:00) (85 - 123)  BP: 107/61 (08 Jan 2020 19:00) (88/57 - 135/63)  BP(mean): 72 (08 Jan 2020 19:00) (57 - 84)  RR: 18 (08 Jan 2020 19:00) (15 - 40)  SpO2: 96% (08 Jan 2020 19:00) (88% - 99%)  Mode: AC/ CMV (Assist Control/ Continuous Mandatory Ventilation)  RR (machine): 14  TV (machine): 500  FiO2: 50  PEEP: 5  ITime: 1  MAP: 7  PIP: 22   (if applicable)    Chest Tube (if applicable)    HEENT: Head is normocephalic and atraumatic. Extraocular muscles are intact. Mucous membranes are moist.  +ETT    NECK: Supple, no palpable adenopathy.    LUNGS: Clear to auscultation, no wheezing, rales, or rhonchi.    HEART: Regular rate and rhythm without murmur.    ABDOMEN: Soft, nontender, and nondistended.  No hepatosplenomegaly is noted.    : No painful voiding, no pelvic pain    EXTREMITIES: Without any cyanosis, clubbing, rash, lesions or edema.    NEUROLOGIC: sedated    SKIN: Warm, dry, good turgor.      LABS:                        15.3   4.18  )-----------( 48       ( 08 Jan 2020 06:40 )             46.6     01-08    144  |  102  |  43<H>  ----------------------------<  119<H>  3.9   |  33<H>  |  1.04    Ca    11.0<H>      08 Jan 2020 06:40  Phos  3.8     01-08  Mg     2.3     01-08      PTT - ( 07 Jan 2020 18:25 )  PTT:>200.0 sec      CARDIAC MARKERS ( 08 Jan 2020 06:40 )  x     / x     / 201 U/L / x     / x                    MICROBIOLOGY: (if applicable)    RADIOLOGY & ADDITIONAL STUDIES:  EKG:   CXR:< from: Xray Chest 1 View- PORTABLE-Routine (01.08.20 @ 10:11) >    EXAM:  XR CHEST PORTABLE ROUTINE 1V                            PROCEDURE DATE:  01/08/2020          INTERPRETATION:  AP semierect chest on January 8, 2020 at 8:46 AM. Patient had NG tube adjustment.     Heart is magnified by technique.    There is some degree of infiltration mild in degree at the left lower lobe likely associated pleural reaction. This may be improved from January 7.    NG tube on January 7 and in the lower esophagus.    On present film the NG tube tip now enters the stomach.    IMPRESSION: NG tube has been advanced into the stomach. Left lower lobe lobe process again noted possibly with some improvement.                DIANA REEVES M.D., ATTENDING RADIOLOGIST  This document has been electronically signed. Jan 8 2020 10:24AM                < end of copied text >    ECHO:    IMPRESSION: 69y Male PAST MEDICAL & SURGICAL HISTORY:  HTN (hypertension)  Atrial fibrillation  No significant past surgical history   p/w         IMP: This is a 69 yr old man with  afib (on Pradaxa), HTN and alcohol abuse presents to the ED with chief complaint of weakness and shortness of breath. Patient is initially admitted to medical floor for CHF exacerbation, afib with RVR, alcohol withdrawal and sepsis 2/2 cellulitis. Patient is AAOx2-3 at baseline, noted lethargic by nurse this morning. Patient is on CIWA protocol and received total 8 mg of ativan Iv push in last 24 hours. RRT was called for respiratory distress and lethargy, patient is minimally arousable to sternal rub, not able to follow commands.  + ASP PNA on antibx. . intubated 1/5      Sugg:  -consider daily weaning   -continue antibx  -duonebs  -precedex  -dvt/gi prophy  -continue care as per icu team    discussed with icu team

## 2020-01-08 NOTE — PROGRESS NOTE ADULT - SUBJECTIVE AND OBJECTIVE BOX
INTERVAL HPI/OVERNIGHT EVENTS: Pt developed fever 100 today. Platelets remained on the lower side. NG tube replaced in am.    PRESSORS: [ ] YES [ x] NO  WHICH:    ANTIBIOTICS:                  DATE STARTED:  ANTIBIOTICS:                  DATE STARTED:  ANTIBIOTICS:                  DATE STARTED:    Antimicrobial:  ampicillin/sulbactam  IVPB      ampicillin/sulbactam  IVPB 3 Gram(s) IV Intermittent every 6 hours    Cardiovascular:  metoprolol tartrate 25 milliGRAM(s) Oral two times a day    Pulmonary:    Hematalogic:  aspirin  chewable 81 milliGRAM(s) Oral daily    Other:  acetaminophen    Suspension .. 1250 milliGRAM(s) Oral once  atorvastatin 40 milliGRAM(s) Oral at bedtime  chlorhexidine 0.12% Liquid 15 milliLiter(s) Oral Mucosa every 12 hours  chlorhexidine 4% Liquid 1 Application(s) Topical daily  dexMEDEtomidine Infusion 0.7 MICROgram(s)/kG/Hr IV Continuous <Continuous>  folic acid 1 milliGRAM(s) Oral daily  insulin lispro (HumaLOG) corrective regimen sliding scale   SubCutaneous every 6 hours  LORazepam   Injectable 2 milliGRAM(s) IV Push every 2 hours  LORazepam   Injectable 2 milliGRAM(s) IV Push every 4 hours PRN  multivitamin 1 Tablet(s) Oral daily  nystatin Powder 1 Application(s) Topical three times a day  pantoprazole   Suspension 40 milliGRAM(s) Enteral Tube daily  propofol Infusion 30 MICROgram(s)/kG/Min IV Continuous <Continuous>  thiamine IVPB 500 milliGRAM(s) IV Intermittent daily    acetaminophen    Suspension .. 1250 milliGRAM(s) Oral once  ampicillin/sulbactam  IVPB      ampicillin/sulbactam  IVPB 3 Gram(s) IV Intermittent every 6 hours  aspirin  chewable 81 milliGRAM(s) Oral daily  atorvastatin 40 milliGRAM(s) Oral at bedtime  chlorhexidine 0.12% Liquid 15 milliLiter(s) Oral Mucosa every 12 hours  chlorhexidine 4% Liquid 1 Application(s) Topical daily  dexMEDEtomidine Infusion 0.7 MICROgram(s)/kG/Hr IV Continuous <Continuous>  folic acid 1 milliGRAM(s) Oral daily  insulin lispro (HumaLOG) corrective regimen sliding scale   SubCutaneous every 6 hours  LORazepam   Injectable 2 milliGRAM(s) IV Push every 2 hours  LORazepam   Injectable 2 milliGRAM(s) IV Push every 4 hours PRN  metoprolol tartrate 25 milliGRAM(s) Oral two times a day  multivitamin 1 Tablet(s) Oral daily  nystatin Powder 1 Application(s) Topical three times a day  pantoprazole   Suspension 40 milliGRAM(s) Enteral Tube daily  propofol Infusion 30 MICROgram(s)/kG/Min IV Continuous <Continuous>  thiamine IVPB 500 milliGRAM(s) IV Intermittent daily    Drug Dosing Weight  Height (cm): 180.34 (03 Jan 2020 15:39)  Weight (kg): 125 (05 Jan 2020 11:30)  BMI (kg/m2): 38.4 (05 Jan 2020 11:30)  BSA (m2): 2.42 (05 Jan 2020 11:30)    CENTRAL LINE: [ ] YES [ ] NO  LOCATION:         VILLATORO: [ ] YES [ ] NO          A-LINE:  [ ] YES [ ] NO  LOCATION:             ICU Vital Signs Last 24 Hrs  T(C): 38 (08 Jan 2020 11:00), Max: 38.2 (08 Jan 2020 10:30)  T(F): 100.4 (08 Jan 2020 11:00), Max: 100.8 (08 Jan 2020 10:30)  HR: 101 (08 Jan 2020 12:18) (85 - 123)  BP: 110/76 (08 Jan 2020 11:00) (82/48 - 135/63)  BP(mean): 84 (08 Jan 2020 11:00) (56 - 84)  ABP: --  ABP(mean): --  RR: 40 (08 Jan 2020 11:00) (14 - 40)  SpO2: 92% (08 Jan 2020 12:18) (85% - 99%)            01-07 @ 07:01  -  01-08 @ 07:00  --------------------------------------------------------  IN: 2273.1 mL / OUT: 970 mL / NET: 1303.1 mL        Mode: AC/ CMV (Assist Control/ Continuous Mandatory Ventilation)  RR (machine): 14  TV (machine): 500  FiO2: 50  PEEP: 5  ITime: 1  MAP: 8.9  PIP: 24      REVIEW OF SYSTEMS:    nOT ABLE TO BE ASSESED AS PT IS ON ETT      PHYSICAL EXAM:    GENERAL: Obese, sedated for ETT  EYES: EOMI, PERRLA,   NECK: Supple, No JVD; Normal thyroid; Trachea midline  NERVOUS SYSTEM:  Sedated for ETT, slightly agitated.   CHEST/LUNG: No rales, rhonchi, wheezing   HEART: Regular rate and rhythm; No murmurs,   ABDOMEN: Soft, Nontender, Nondistended; Bowel sounds present.  Urine output - net 1500  EXTREMITIES:  2+ Peripheral Pulses, No clubbing, cyanosis, or edema        LABS:  CBC Full  -  ( 08 Jan 2020 06:40 )  WBC Count : 4.18 K/uL  RBC Count : 4.65 M/uL  Hemoglobin : 15.3 g/dL  Hematocrit : 46.6 %  Platelet Count - Automated : 48 K/uL  Mean Cell Volume : 100.2 fl  Mean Cell Hemoglobin : 32.9 pg  Mean Cell Hemoglobin Concentration : 32.8 gm/dL  Auto Neutrophil # : x  Auto Lymphocyte # : x  Auto Monocyte # : x  Auto Eosinophil # : x  Auto Basophil # : x  Auto Neutrophil % : x  Auto Lymphocyte % : x  Auto Monocyte % : x  Auto Eosinophil % : x  Auto Basophil % : x    01-08    144  |  102  |  43<H>  ----------------------------<  119<H>  3.9   |  33<H>  |  1.04    Ca    11.0<H>      08 Jan 2020 06:40  Phos  3.8     01-08  Mg     2.3     01-08      PTT - ( 07 Jan 2020 18:25 )  PTT:>200.0 sec        RADIOLOGY & ADDITIONAL STUDIES REVIEWED:    < from: Xray Chest 1 View- PORTABLE-Routine (01.08.20 @ 10:11) >    IMPRESSION: NG tube has been advanced into the stomach. Left lower lobe lobe process again noted possibly with some improvement.    < end of copied text >      [ ]GOALS OF CARE DISCUSSION WITH PATIENT/FAMILY/PROXY:    CRITICAL CARE TIME SPENT: 35 minutes

## 2020-01-08 NOTE — PROGRESS NOTE ADULT - SUBJECTIVE AND OBJECTIVE BOX
pt seen,  examined case discussed with renal attending    SUBJECTIVE:  pt feels fine and denies cp,sob,gi or gu/uremic  symptoms    REVIEW OF SYSTEMS:  Unable to get ROS due to clinical condition  Current meds:    ampicillin/sulbactam  IVPB      ampicillin/sulbactam  IVPB 3 Gram(s) IV Intermittent every 6 hours  aspirin  chewable 81 milliGRAM(s) Oral daily  atorvastatin 40 milliGRAM(s) Oral at bedtime  chlorhexidine 0.12% Liquid 15 milliLiter(s) Oral Mucosa every 12 hours  chlorhexidine 4% Liquid 1 Application(s) Topical daily  dexMEDEtomidine Infusion 0.7 MICROgram(s)/kG/Hr IV Continuous <Continuous>  folic acid 1 milliGRAM(s) Oral daily  insulin lispro (HumaLOG) corrective regimen sliding scale   SubCutaneous every 6 hours  LORazepam   Injectable 2 milliGRAM(s) IV Push every 2 hours  LORazepam   Injectable 2 milliGRAM(s) IV Push every 4 hours PRN  metoprolol tartrate 25 milliGRAM(s) Oral two times a day  multivitamin 1 Tablet(s) Oral daily  nystatin Powder 1 Application(s) Topical three times a day  pantoprazole   Suspension 40 milliGRAM(s) Enteral Tube daily  thiamine IVPB 500 milliGRAM(s) IV Intermittent daily      Vital Signs    T(F): 101.9 (20 @ 16:03), Max: 101.9 (20 @ 16:03)  HR: 113 (20 @ 15:00) (85 - 123)  BP: 115/70 (20 @ 15:00) (82/48 - 135/63)  ABP: --  RR: 18 (20 @ 15:00) (14 - 40)  SpO2: 94% (20 @ 15:00) (88% - 99%)  Wt(kg): --  CVP(cm H2O): --  CO: --  PCWP: --    I and O's:     @ 07:01  -   @ 07:00  --------------------------------------------------------  IN:    Enteral Tube Flush: 200 mL    heparin  Infusion.: 520 mL    ns in tub fed  [id number]: 840 mL    propofol Infusion: 367.5 mL    propofol Infusion: 157.5 mL    Solution: 300 mL    Solution: 200 mL  Total IN: 2585 mL    OUT:    Indwelling Catheter - Urethral: 1749 mL  Total OUT: 1749 mL    Total NET: 836 mL       @ 07:01  -  08 @ 07:00  --------------------------------------------------------  IN:    dexmedetomidine Infusion: 84.1 mL    dexmedetomidine Infusion: 132 mL    heparin  Infusion.: 192 mL    Lactated Ringers IV Bolus: 500 mL    ns in tub fed  [id number]: 525 mL    propofol Infusion: 135 mL    propofol Infusion: 405 mL    Solution: 300 mL  Total IN: 2273.1 mL    OUT:    Indwelling Catheter - Urethral: 970 mL  Total OUT: 970 mL    Total NET: 1303.1 mL       @ 07: @ 16:26  --------------------------------------------------------  IN:    dexmedetomidine Infusion: 290.6 mL    Enteral Tube Flush: 50 mL    propofol Infusion: 22.5 mL    Solution: 100 mL    Solution: 100 mL  Total IN: 563.1 mL    OUT:    Indwelling Catheter - Urethral: 240 mL  Total OUT: 240 mL    Total NET: 323.1 mL        Daily     Daily Weight in k.8 (2020 07:15)    PHYSICAL EXAM:  Constitutional: intubated and sedated  HEENT: PERRLA,  Neck: No JVD, thyromegaly or adenopathy  Respiratory: reduced air entry lower lungs with no rales, wheezing or rhonchi  Cardiovascular: S1 and S2 normally heard  Gastrointestinal: soft, nondistended, nontender and normal bowel sounds heard  Extremities: No peripheral edema or cyanosis  Neurological: intubated and sedated  : No flank masses  Skin: No rashes      LABS:    CBC:                          15.3   4.18  )-----------( 48       ( 2020 06:40 )             46.6           BMP:        144  |  102  |  43<H>  ----------------------------<  119<H>  3.9   |  33<H>  |  1.04      139  |  98  |  30<H>  ----------------------------<  152<H>  3.7   |  32<H>  |  0.83      137  |  102  |  24<H>  ----------------------------<  81  4.4   |  27  |  0.78    Ca    11.0<H>      2020 06:40  Ca    10.5      2020 04:34  Ca    9.5      2020 06:14  Phos  3.8     -  Phos  3.0     -  Phos  4.1     -  Mg     2.3     -  Mg     2.3     -  Mg     2.2     -    TPro  5.8<L>  /  Alb  2.2<L>  /  TBili  6.4<H>  /  DBili  x   /  AST  453<H>  /  ALT  389<H>  /  AlkPhos  227<H>   pt seen,  examined case discussed with renal attending    SUBJECTIVE:  pt is sedated    REVIEW OF SYSTEMS:  Unable to get ROS due to clinical condition  Current meds:    ampicillin/sulbactam  IVPB      ampicillin/sulbactam  IVPB 3 Gram(s) IV Intermittent every 6 hours  aspirin  chewable 81 milliGRAM(s) Oral daily  atorvastatin 40 milliGRAM(s) Oral at bedtime  chlorhexidine 0.12% Liquid 15 milliLiter(s) Oral Mucosa every 12 hours  chlorhexidine 4% Liquid 1 Application(s) Topical daily  dexMEDEtomidine Infusion 0.7 MICROgram(s)/kG/Hr IV Continuous <Continuous>  folic acid 1 milliGRAM(s) Oral daily  insulin lispro (HumaLOG) corrective regimen sliding scale   SubCutaneous every 6 hours  LORazepam   Injectable 2 milliGRAM(s) IV Push every 2 hours  LORazepam   Injectable 2 milliGRAM(s) IV Push every 4 hours PRN  metoprolol tartrate 25 milliGRAM(s) Oral two times a day  multivitamin 1 Tablet(s) Oral daily  nystatin Powder 1 Application(s) Topical three times a day  pantoprazole   Suspension 40 milliGRAM(s) Enteral Tube daily  thiamine IVPB 500 milliGRAM(s) IV Intermittent daily      Vital Signs    T(F): 101.9 (20 @ 16:03), Max: 101.9 (20 @ 16:03)  HR: 113 (20 @ 15:00) (85 - 123)  BP: 115/70 (20 @ 15:00) (82/48 - 135/63)  ABP: --  RR: 18 (20 @ 15:00) (14 - 40)  SpO2: 94% (20 @ 15:00) (88% - 99%)  Wt(kg): --  CVP(cm H2O): --  CO: --  PCWP: --    I and O's:     @ 07:01  -   @ 07:00  --------------------------------------------------------  IN:    Enteral Tube Flush: 200 mL    heparin  Infusion.: 520 mL    ns in tub fed  [id number]: 840 mL    propofol Infusion: 367.5 mL    propofol Infusion: 157.5 mL    Solution: 300 mL    Solution: 200 mL  Total IN: 2585 mL    OUT:    Indwelling Catheter - Urethral: 1749 mL  Total OUT: 1749 mL    Total NET: 836 mL       @ 07: @ 07:00  --------------------------------------------------------  IN:    dexmedetomidine Infusion: 84.1 mL    dexmedetomidine Infusion: 132 mL    heparin  Infusion.: 192 mL    Lactated Ringers IV Bolus: 500 mL    ns in tub fed  [id number]: 525 mL    propofol Infusion: 135 mL    propofol Infusion: 405 mL    Solution: 300 mL  Total IN: 2273.1 mL    OUT:    Indwelling Catheter - Urethral: 970 mL  Total OUT: 970 mL    Total NET: 1303.1 mL       @ 07: @ 16:26  --------------------------------------------------------  IN:    dexmedetomidine Infusion: 290.6 mL    Enteral Tube Flush: 50 mL    propofol Infusion: 22.5 mL    Solution: 100 mL    Solution: 100 mL  Total IN: 563.1 mL    OUT:    Indwelling Catheter - Urethral: 240 mL  Total OUT: 240 mL    Total NET: 323.1 mL        Daily     Daily Weight in k.8 (2020 07:15)    PHYSICAL EXAM:  Constitutional: intubated and sedated  HEENT: PERRLA,  Neck: No JVD, thyromegaly or adenopathy  Respiratory: reduced air entry lower lungs with no rales, wheezing or rhonchi  Cardiovascular: S1 and S2 normally heard  Gastrointestinal: soft, nondistended, nontender and normal bowel sounds heard  Extremities: No peripheral edema or cyanosis  Neurological: intubated and sedated  : No flank masses  Skin: No rashes      LABS:    CBC:                          15.3   4.18  )-----------( 48       ( 2020 06:40 )             46.6       BMP:        144  |  102  |  43<H>  ----------------------------<  119<H>  3.9   |  33<H>  |  1.04      139  |  98  |  30<H>  ----------------------------<  152<H>  3.7   |  32<H>  |  0.83      137  |  102  |  24<H>  ----------------------------<  81  4.4   |  27  |  0.78    Ca    11.0<H>      2020 06:40  Ca    10.5      2020 04:34  Ca    9.5      2020 06:14  Phos  3.8     -  Phos  3.0     -  Phos  4.1     -  Mg     2.3     -  Mg     2.3     -  Mg     2.2     -    TPro  5.8<L>  /  Alb  2.2<L>  /  TBili  6.4<H>  /  DBili  x   /  AST  453<H>  /  ALT  389<H>  /  AlkPhos  227<H>

## 2020-01-08 NOTE — PROGRESS NOTE ADULT - SUBJECTIVE AND OBJECTIVE BOX
GI PROGRESS NOTE    Patient is a 69y old  Male who presents with a chief complaint of Resp distress (08 Jan 2020 12:21)      HPI:  70 y/o male with PMHx of afib (on Pradaxa), HTN and alcohol abuse presents to the ED with chief complaint of weakness and shortness of breath. Patient is AAOx2 and a vague historian. He reports having multiple falls in the past 2 weeks. He thinks he has experienced LOC sometimes as well. He endorses left lower chest pain, described as worse with movement and pleuritic in nature. He reports lower extremitiy edema over the past few months. He also complains of left lower abdominal pain. He denies nausea or vomiting. He reports currently drinking 1 bottle of wine daily with a shot of scotch. He lives alone and says he ambulates independently.     ED course:  EKG shows afib with RVR  RLE noted to be cool to touch compared to left - bedside doppler confirmed pulses of both LE. (04 Jan 2020 00:23)          ______________________________________________________________________  PMH/PSH:  PAST MEDICAL & SURGICAL HISTORY:  HTN (hypertension)  Atrial fibrillation  No significant past surgical history    ______________________________________________________________________  MEDS:  MEDICATIONS  (STANDING):  ampicillin/sulbactam  IVPB      ampicillin/sulbactam  IVPB 3 Gram(s) IV Intermittent every 6 hours  aspirin  chewable 81 milliGRAM(s) Oral daily  atorvastatin 40 milliGRAM(s) Oral at bedtime  chlorhexidine 0.12% Liquid 15 milliLiter(s) Oral Mucosa every 12 hours  chlorhexidine 4% Liquid 1 Application(s) Topical daily  dexMEDEtomidine Infusion 0.7 MICROgram(s)/kG/Hr (21.875 mL/Hr) IV Continuous <Continuous>  folic acid 1 milliGRAM(s) Oral daily  insulin lispro (HumaLOG) corrective regimen sliding scale   SubCutaneous every 6 hours  LORazepam   Injectable 2 milliGRAM(s) IV Push every 2 hours  metoprolol tartrate 25 milliGRAM(s) Oral two times a day  multivitamin 1 Tablet(s) Oral daily  nystatin Powder 1 Application(s) Topical three times a day  pantoprazole   Suspension 40 milliGRAM(s) Enteral Tube daily  propofol Infusion 30 MICROgram(s)/kG/Min (22.5 mL/Hr) IV Continuous <Continuous>  thiamine IVPB 500 milliGRAM(s) IV Intermittent daily    MEDICATIONS  (PRN):  LORazepam   Injectable 2 milliGRAM(s) IV Push every 4 hours PRN Agitation    ______________________________________________________________________  ALL:   Allergies    No Known Allergies    Intolerances      ______________________________________________________________________  SH: Social History:  Former smoker - 1ppd for 40 years; quit at age 57; drinks heavily - 1 bottle of wine daily; lives alone (04 Jan 2020 00:23)    ______________________________________________________________________  FH:  FAMILY HISTORY:  No pertinent family history in first degree relatives    ______________________________________________________________________  ROS:    CONSTITUTIONAL:  No weight loss, fever, chills, weakness or fatigue.    HEENT:  Eyes:  No visual loss, blurred vision, double vision or yellow sclerae. Ears, Nose, Throat:  No hearing loss, sneezing, congestion, runny nose or sore throat.    SKIN:  No rash or itching.    CARDIOVASCULAR:  No chest pain, chest pressure or chest discomfort. No palpitations or edema.    RESPIRATORY:  No shortness of breath, cough or sputum.    GASTROINTESTINAL:  SEE HPI    GENITOURINARY:  No dysuria, hematuria, urinary frequency    NEUROLOGICAL:  No headache, dizziness, syncope, paralysis, ataxia, numbness or tingling in the extremities. No change in bowel or bladder control.    MUSCULOSKELETAL:  No muscle, back pain, joint pain or stiffness.    HEMATOLOGIC:  No anemia, bleeding or bruising.    LYMPHATICS:  No enlarged nodes. No history of splenectomy.    PSYCHIATRIC:  No history of depression or anxiety.    ENDOCRINOLOGIC:  No reports of sweating, cold or heat intolerance. No polyuria or polydipsia.    ALLERGIES:  No history of asthma, hives, eczema or rhinitis.  ______________________________________________________________________  PHYSICAL EXAM:  T(C): 38 (01-08-20 @ 11:00), Max: 38.2 (01-08-20 @ 10:30)  HR: 101 (01-08-20 @ 12:18)  BP: 113/62 (01-08-20 @ 12:15)  RR: 20 (01-08-20 @ 12:15)  SpO2: 92% (01-08-20 @ 12:18)  Wt(kg): --    01-07 - 01-08  --------------------------------------------------------  IN:    dexmedetomidine Infusion: 84.1 mL    dexmedetomidine Infusion: 132 mL    heparin  Infusion.: 192 mL    Lactated Ringers IV Bolus: 500 mL    ns in tub fed  [id number]: 525 mL    propofol Infusion: 135 mL    propofol Infusion: 405 mL    Solution: 300 mL  Total IN: 2273.1 mL    OUT:    Indwelling Catheter - Urethral: 970 mL  Total OUT: 970 mL    Total NET: 1303.1 mL      01-08 - 01-08  --------------------------------------------------------  IN:    dexmedetomidine Infusion: 21.8 mL    propofol Infusion: 22.5 mL  Total IN: 44.3 mL    OUT:    Indwelling Catheter - Urethral: 30 mL  Total OUT: 30 mL    Total NET: 14.3 mL          GEN: Sedated, intubated  CVS- S1 S2  ABD: soft nontender, non distended, bowel sounds+  LUNGS: clear to auscultation  NEURO: sedated  Extremities: no cyanosis, no calf tenderness, no edema, no clubbing      ______________________________________________________________________  LABS:                        15.3   4.18  )-----------( 48       ( 08 Jan 2020 06:40 )             46.6     01-08    144  |  102  |  43<H>  ----------------------------<  119<H>  3.9   |  33<H>  |  1.04    Ca    11.0<H>      08 Jan 2020 06:40  Phos  3.8     01-08  Mg     2.3     01-08        ______________________________________________________________________  IMAGING:    ______________________________________________________________________  ASSESSMENT:  69y Male    PLAN:

## 2020-01-08 NOTE — PROGRESS NOTE ADULT - ASSESSMENT
· Assessment	  A/P:  1.HYPONATERMIA: Hypervolemic, most likley secondary to chf/liver ds induced  -Na improved to within normal limits. Will continue to monitor   -suggest to continue current care with diuretics and fluid restriction to 1000 ml per day  -f/u Na level daily  2.HYPERCALCEMIA: Elevated  -suggest malignancy w/u if ca remains high  3.EDEMA:multifactorial like chf/liver ds  -avoid iv fluid  Will sign off now, please reconsult as needed  Rest as per micu team · Assessment	  A/P:  1.HYPONATERMIA: Hypervolemic, most likley secondary to chf/liver ds induced  -Na improved to within normal limits. Will continue to monitor   -suggest to continue current care with diuretics and fluid restriction to 1000 ml per day  -f/u Na level daily  2.HYPERCALCEMIA: Elevated  -continue iv Lasix   -suggest endocrine consult for hyperalcemia  3.EDEMA:multifactorial like chf/liver ds  -avoid iv fluid  4.REI: BUN>> CR , r/o secondary to pre-renal azotemia like sepsis/liver ds  -suggest septic Rx , r/o gi bleed  -Keep patient euvolemic and renal diet  -Avoid Nephrotoxic Meds/ Agents such as (NSAIDs, IV contrast, Aminoglycosides such as gentamicin, -Gadolinium contrast, Phosphate containing enemas, etc..)  -Adjust Medications according to eGFR  -f/u bmp daily  Rest as per micu team

## 2020-01-08 NOTE — PROGRESS NOTE ADULT - PROBLEM SELECTOR PLAN 1
likely 2/2 cirrhosis, Alcohol use, Congestive hepatopathy and/ or possible mets to liver.  Please trend LFT's  Hep panel negative  Will need CT biopsy of liver lesions and MRI when stable.   Continue diuresis.   Start lactulose and Xifaxan if encephalopathic.   recommend send AFP r/o HCC.

## 2020-01-09 NOTE — PROGRESS NOTE ADULT - SUBJECTIVE AND OBJECTIVE BOX
GI PROGRESS NOTE    Patient is a 69y old  Male who presents with a chief complaint of resp failure (08 Jan 2020 19:19)      HPI:  68 y/o male with PMHx of afib (on Pradaxa), HTN and alcohol abuse presents to the ED with chief complaint of weakness and shortness of breath. Patient is AAOx2 and a vague historian. He reports having multiple falls in the past 2 weeks. He thinks he has experienced LOC sometimes as well. He endorses left lower chest pain, described as worse with movement and pleuritic in nature. He reports lower extremitiy edema over the past few months. He also complains of left lower abdominal pain. He denies nausea or vomiting. He reports currently drinking 1 bottle of wine daily with a shot of scotch. He lives alone and says he ambulates independently.     ED course:  EKG shows afib with RVR  RLE noted to be cool to touch compared to left - bedside doppler confirmed pulses of both LE. (04 Jan 2020 00:23)          ______________________________________________________________________  PMH/PSH:  PAST MEDICAL & SURGICAL HISTORY:  HTN (hypertension)  Atrial fibrillation  No significant past surgical history    ______________________________________________________________________  MEDS:  MEDICATIONS  (STANDING):  aspirin  chewable 81 milliGRAM(s) Oral daily  atorvastatin 40 milliGRAM(s) Oral at bedtime  chlorhexidine 0.12% Liquid 15 milliLiter(s) Oral Mucosa every 12 hours  chlorhexidine 4% Liquid 1 Application(s) Topical daily  chlorhexidine 4% Liquid 1 Application(s) Topical <User Schedule>  dexMEDEtomidine Infusion 0.7 MICROgram(s)/kG/Hr (21.875 mL/Hr) IV Continuous <Continuous>  folic acid 1 milliGRAM(s) Oral daily  insulin lispro (HumaLOG) corrective regimen sliding scale   SubCutaneous every 6 hours  LORazepam   Injectable 2 milliGRAM(s) IV Push every 2 hours  metoprolol tartrate 25 milliGRAM(s) Oral two times a day  multivitamin 1 Tablet(s) Oral daily  norepinephrine Infusion 0.05 MICROgram(s)/kG/Min (5.859 mL/Hr) IV Continuous <Continuous>  nystatin Powder 1 Application(s) Topical three times a day  pantoprazole   Suspension 40 milliGRAM(s) Enteral Tube daily  piperacillin/tazobactam IVPB.. 3.375 Gram(s) IV Intermittent every 8 hours  sodium chloride 0.9% Bolus 1000 milliLiter(s) IV Bolus once  thiamine IVPB 500 milliGRAM(s) IV Intermittent daily  vancomycin  IVPB 1000 milliGRAM(s) IV Intermittent <User Schedule>  vancomycin  IVPB 1000 milliGRAM(s) IV Intermittent once    MEDICATIONS  (PRN):  LORazepam   Injectable 2 milliGRAM(s) IV Push every 4 hours PRN Agitation  sodium chloride 0.9% lock flush 10 milliLiter(s) IV Push every 1 hour PRN Pre/post blood products, medications, blood draw, and to maintain line patency    ______________________________________________________________________  ALL:   Allergies    No Known Allergies    Intolerances      ______________________________________________________________________  SH: Social History:  Former smoker - 1ppd for 40 years; quit at age 57; drinks heavily - 1 bottle of wine daily; lives alone (04 Jan 2020 00:23)    ______________________________________________________________________  FH:  FAMILY HISTORY:  No pertinent family history in first degree relatives    ______________________________________________________________________  ROS:    CONSTITUTIONAL:  No weight loss, fever, chills, weakness or fatigue.    HEENT:  Eyes:  No visual loss, blurred vision, double vision or yellow sclerae. Ears, Nose, Throat:  No hearing loss, sneezing, congestion, runny nose or sore throat.    SKIN:  No rash or itching.    CARDIOVASCULAR:  No chest pain, chest pressure or chest discomfort. No palpitations or edema.    RESPIRATORY:  No shortness of breath, cough or sputum.    GASTROINTESTINAL:  SEE HPI    GENITOURINARY:  No dysuria, hematuria, urinary frequency    NEUROLOGICAL:  No headache, dizziness, syncope, paralysis, ataxia, numbness or tingling in the extremities. No change in bowel or bladder control.    MUSCULOSKELETAL:  No muscle, back pain, joint pain or stiffness.    HEMATOLOGIC:  No anemia, bleeding or bruising.    LYMPHATICS:  No enlarged nodes. No history of splenectomy.    PSYCHIATRIC:  No history of depression or anxiety.    ENDOCRINOLOGIC:  No reports of sweating, cold or heat intolerance. No polyuria or polydipsia.    ALLERGIES:  No history of asthma, hives, eczema or rhinitis.  ______________________________________________________________________  PHYSICAL EXAM:  T(C): 37.7 (01-09-20 @ 06:45), Max: 41.1 (01-08-20 @ 21:30)  HR: 94 (01-09-20 @ 08:26)  BP: 107/69 (01-09-20 @ 08:00)  RR: 15 (01-09-20 @ 08:00)  SpO2: 96% (01-09-20 @ 08:26)  Wt(kg): --    01-08  -  01-09  --------------------------------------------------------  IN:    dexmedetomidine Infusion: 781.4 mL    Enteral Tube Flush: 130 mL    Glucerna 1.5: 40 mL    norepinephrine Infusion: 140.4 mL    norepinephrine Infusion: 6 mL    propofol Infusion: 22.5 mL    Sodium Chloride 0.9% IV Bolus: 1000 mL    Solution: 100 mL    Solution: 200 mL    Solution: 250 mL    Solution: 200 mL  Total IN: 2870.3 mL    OUT:    Indwelling Catheter - Urethral: 940 mL  Total OUT: 940 mL    Total NET: 1930.3 mL          GEN: NAD, sedated   CVS- S1 S2  ABD: soft nontender, non distended, bowel sounds+  LUNGS: clear to auscultation, intubated on Vent  NEURO: does not follow commands   Extremities: no cyanosis, no calf tenderness, no edema, no clubbing      ______________________________________________________________________  LABS:                        15.4   6.79  )-----------( 52       ( 09 Jan 2020 05:51 )             48.9     01-09    147<H>  |  108  |  70<H>  ----------------------------<  102<H>  3.8   |  27  |  1.64<H>    Ca    10.7<H>      09 Jan 2020 05:51  Phos  5.2     01-09  Mg     2.6     01-09        ______________________________________________________________________

## 2020-01-09 NOTE — CONSULT NOTE ADULT - SUBJECTIVE AND OBJECTIVE BOX
HPI:  68 y/o male with PMHx of afib (on Pradaxa), HTN and alcohol abuse presented to the ED with chief complaint of weakness and shortness of breath. Patient was AAOx2 and a vague historian. He reported having multiple falls in the past 2 weeks, reportedly experiencing LOC at times. He reported + chest pain, worse with movement and pleuritic in nature; + chronic BLE edema, +left lower abdominal pain. Patient reported drinking 1 bottle of wine daily with a shot of scotch.      Hospital Course:  Patient was initially admitted to medical floor for CHF exacerbation, afib with RVR, alcohol withdrawal and sepsis 2/2 cellulitis. However, s/p RRT for respiratory distress and lethargy requiring intubation. Patient remains sedated/intubated; on IV abx for aspiration PNA and pressor. Request to identify surrogate and establish goals of care. Patient is a full code.      PAST MEDICAL & SURGICAL HISTORY:  HTN (hypertension)  Atrial fibrillation  No significant past surgical history      SOCIAL HISTORY:    Admitted from:  home, lives alone  Substance abuse history:  unknown            Tobacco hx: Former smoker - 1ppd for 40 years; quit at age 57                 Alcohol hx:  1 bottle of wine and shot of scotch/day            Home Opioid hx: unknown  Mandaen:  no Anabaptism affiliation                                  Preferred Language: English    Surrogate/HCP/Guardian:   Tammy Andrade (sister): [phone]    FAMILY HISTORY:  No pertinent family history in first degree relatives    Baseline ADLs (prior to admission): lives alone, ambulatory, + falls    No Known Allergies    Present Symptoms:         Review of Systems:Unable to obtain- patient sedated/intubated; minimally responsive to tactile stimuli    MEDICATIONS  (STANDING):  aspirin  chewable 81 milliGRAM(s) Oral daily  atorvastatin 40 milliGRAM(s) Oral at bedtime  chlorhexidine 0.12% Liquid 15 milliLiter(s) Oral Mucosa every 12 hours  chlorhexidine 4% Liquid 1 Application(s) Topical daily  chlorhexidine 4% Liquid 1 Application(s) Topical <User Schedule>  dexMEDEtomidine Infusion 0.7 MICROgram(s)/kG/Hr (21.875 mL/Hr) IV Continuous <Continuous>  folic acid 1 milliGRAM(s) Oral daily  insulin lispro (HumaLOG) corrective regimen sliding scale   SubCutaneous every 6 hours  multivitamin 1 Tablet(s) Oral daily  norepinephrine Infusion 0.05 MICROgram(s)/kG/Min (5.859 mL/Hr) IV Continuous <Continuous>  nystatin Powder 1 Application(s) Topical three times a day  pantoprazole   Suspension 40 milliGRAM(s) Enteral Tube daily  piperacillin/tazobactam IVPB.. 3.375 Gram(s) IV Intermittent every 8 hours  sodium chloride 0.9% Bolus 1000 milliLiter(s) IV Bolus once  thiamine IVPB 500 milliGRAM(s) IV Intermittent daily  vancomycin  IVPB 1000 milliGRAM(s) IV Intermittent <User Schedule>  vancomycin  IVPB 1000 milliGRAM(s) IV Intermittent once    MEDICATIONS  (PRN):  PHENobarbital Injectable 130 milliGRAM(s) IV Push every 6 hours PRN agitation  sodium chloride 0.9% lock flush 10 milliLiter(s) IV Push every 1 hour PRN Pre/post blood products, medications, blood draw, and to maintain line patency      PHYSICAL EXAM:    Vital Signs Last 24 Hrs  T(C): 38 (2020 16:30), Max: 41.1 (2020 21:30)  T(F): 100.4 (2020 16:30), Max: 105.9 (2020 21:30)  HR: 91 (2020 16:30) (83 - 129)  BP: 100/55 (2020 16:00) (58/40 - 134/79)  BP(mean): 65 (2020 16:00) (44 - 94)  RR: 12 (2020 16:30) (11 - 42)  SpO2: 96% (2020 16:30) (90% - 100%)    General: patient sedated/intubated; minimally responsive to tactile stimuli, unable to follow commands. No distress.   Karnofsky Performance Score/Palliative Performance Status Version2:     20%    HEENT:  dry mouth  ET tube   Lungs: comfortable, on vent support. On precedex  CV: S1S2, IRR. tachycardia. On pressor  GI: soft, NTND, +mass palpable in R middle quadrant, incontinent  :   [place]  Musculoskeletal: ambulatory at baseline; patient now sedated/intubated; minimally responsive to pain, unable to follow commands  Skin: Bilateral Feet mottling; L. Gluteal intact DTI  Neuro: patient sedated/intubated; minimally responsive to tactile stimuli, unable to follow commands  Oral intake ability: unable/only mouth care    Diet: NPO; TF via NG tube    LABS:                        15.4   6.79  )-----------( 52       ( 2020 05:51 )             48.9         147<H>  |  108  |  70<H>  ----------------------------<  102<H>  3.8   |  27  |  1.64<H>    Ca    10.7<H>      2020 05:51  Phos  5.2       Mg     2.6           Urinalysis Basic - ( 2020 12:15 )    Color: Marleny / Appearance: very cloudy / S.025 / pH: x  Gluc: x / Ketone: Trace  / Bili: Moderate / Urobili: 4   Blood: x / Protein: 100 / Nitrite: Positive   Leuk Esterase: Trace / RBC: See Note /HPF / WBC 0-2 /HPF   Sq Epi: x / Non Sq Epi: Occasional /HPF / Bacteria: Many /HPF    Albumin: 2.2    RADIOLOGY & ADDITIONAL STUDIES:  < from: CT Abdomen and Pelvis w/ IV Cont (20 @ 20:04) >  EXAM:  CT ABDOMEN AND PELVIS IC                        PROCEDURE DATE:  2020    INTERPRETATION:  CT ABDOMEN AND PELVIS WITH IV CONTRAST  CLINICAL INFORMATION: lower abdominal pain, swelling, fever  COMPARISON: None.  PROCEDURE:   CT of the Abdomen and Pelvis was performed with intravenous contrast.  Intravenous contrast: 90 cc Omnipaque 350  Oral contrast: None.  Sagittal and coronal reformats were performed.  FINDINGS:  LOWER CHEST: Clear.  LIVER: Vague low-attenuation lesions throughout the liver with nodular contour.  BILE DUCTS: Nondilated.  GALLBLADDER: Normal.  SPLEEN: Normal.  PANCREAS: Normal.  ADRENALS: Mild bilateral nodularity.  KIDNEYS/URETERS: No calculi, hydronephrosis, or renal mass.  BLADDER: Normal.  REPRODUCTIVE ORGANS: Unremarkable prostate and seminal vesicles.  BOWEL: Short left mid abdominal intussusception, likely transient. No bowel obstruction. No evidence of diverticulitis or colitis.  PERITONEUM: No free air or ascites.  VESSELS:  Normal caliber aorta.  RETROPERITONEUM/LYMPH NODES: No adenopathy.    ABDOMINAL WALL: Soft tissue edema lower abdominal wall.  BONES: No aggressive lesion.    IMPRESSION:   No acute pathology. No diverticulitis or colitis.  Vague low-attenuation lesions throughout the liver with nodular contour. While findings may be related to cirrhosis, there are no ancillary findings of cirrhosis or portal hypertension. Diffuse metastatic disease to the liver could have a similar appearance. Follow-up MRI abdomen with and without contrast.  Soft tissue edema lower abdominal wall. Correlate for cellulitis.  < end of copied text >    < from: US Abdomen Limited (20 @ 14:26) >  EXAM:  US ABDOMEN LIMITED                        PROCEDURE DATE:  2020    INTERPRETATION:  History: Elevated LFTs.  Right upper quadrant ultrasound.  Gallbladder contracted and not well visualized. No gross gallstones. No significant biliary dilatation. Common duct 0.6 cm. Liver is enlarged up to a 24.6 cm diffusely heterogeneous with multiple hyperechoic lesions suggestive of metastatic disease. Recommend contrast-enhanced MR for better evaluation. Pancreas not adequately visualized. Right kidney 11.7 cm unremarkable. No ascites.    Impression: Contracted gallbladder limiting evaluation.  No biliary dilatation. Enlarged heterogeneous liver with multiple lesions suggestive of metastatic disease. Correlate with MR.  < end of copied text >    < from: Xray Chest 1 View- PORTABLE-Urgent (20 @ 02:13) >  EXAM:  XR CHEST PORTABLE URGENT 1V                        PROCEDURE DATE:  2020    INTERPRETATION:  CLINICAL INDICATION: 69 years  Male with center line placement.  COMPARISON: 2020  AP view the chest is rotated to the right. There is right upper lobe soft tissue prominence suggestive of infiltrate, atelectasis or mass. There is mild pulmonary vascular congestion. No focal consolidations present. There is no right pleural effusion. A small left pleural effusion is reidentified. The heart is mildly enlarged  The left hilum is clear.  The tip of the ET tube is in the mid trachea. The tip of the NG tube is below the diaphragm. There is a right midline catheter with its tip overlying the expected location of the right subclavian vein. There is no pleural effusion. There is no pneumothorax.  Mild thoracic degenerative changes are present.    IMPRESSION:  Right midline catheter, ET tube and NG tube in satisfactory position.  Right upper lobe opacity suggestive of atelectasis, infiltrate or mass. Small left pleural effusion.  Cardiomegaly.  < end of copied text >    < from: Transthoracic Echocardiogram (20 @ 11:37) >  Patient name: CONNER FLORES  YOB: 1950   Age: 69 (M)   MR#: [id number]  Study Date: 2020  Location: [id number]: [facility]ub[facility] Nava [facility]CS  Study quality: Technically good  Referring Physician: Nicole Mccallum MD  Blood Pressure: 110/73 mmHg  Height: 180 cm  Weight: 125 kg  BSA: 2.4 m2  ------------------------------------------------------------------------    PROCEDURE: Transthoracic echocardiogram with 2-D, M-Mode  and complete spectral and color flow Doppler.  INDICATION: Abnormal electrocardiogram [ECG] [EKG] (R94.31)  HISTORY:  ------------------------------------------------------------------------  DIMENSIONS:  Dimensions:     Normal Values:  LA:     5.5 cm    2.0 - 4.0 cm  Ao:     3.7 cm    2.0 - 3.8 cm  SEPTUM: 1.4 cm    0.6 - 1.2 cm  PWT:    1.4 cm    0.6 - 1.1 cm  LVIDd:  5.3 cm    3.0 - 5.6 cm  LVIDs:  3.7 cm    1.8 - 4.0 cm      Derived Variables:  LVMI: 132 g/m2  RWT: 0.52  Ejection Fraction Visual Estimate: 55-60 %    ------------------------------------------------------------------------  OBSERVATIONS:  Mitral Valve: Mitral annular calcification.  Aortic Root: Aortic Root: 3.7 cm.    Aortic Valve: Calcified trileaflet aortic valve with normal  opening.  Left Atrium: Normal left atrium.  LA volume index = 33  cc/m2.  Left Ventricle: Normal Left Ventricular Systolic Function,  (EF = 55 to 60%) Mild concentric left ventricular  hypertrophy. Grade II diastolic dysfunction.  Right Heart: Normal right atrium. Normal right ventricular  size and function. There is mild tricuspid regurgitation.  Pulmonic valve not well seen.  Pericardium/PleuraNormal pericardium with no pericardial  effusion.  Hemodynamic: Unable to estimate RVSP.  ------------------------------------------------------------------------  CONCLUSIONS:  1. Mitral annular calcification.  2. Calcified trileaflet aortic valve with normal opening.  3. Aortic Root: 3.7 cm.  4. Normal left atrium.  LA volume index = 33 cc/m2.  5. Mild concentric left ventricular hypertrophy.  6.Normal Left Ventricular Systolic Function,  (EF = 55 to  60%)  7. Grade II diastolic dysfunction.  8. Normal right atrium.  9. Normal right ventricular size and function.  10. Unable to estimate RVSP.  11. There is mild tricuspid regurgitation.  12. Pulmonic valve not well seen.  13. Normal pericardium with no pericardial effusion.      < end of copied text >      ADVANCE DIRECTIVES:   Advanced Care Planning discussion total time spent: HPI:  68 y/o male with PMHx of afib (on Pradaxa), HTN and alcohol abuse presented to the ED with chief complaint of weakness and shortness of breath. Patient was AAOx2 and a vague historian. He reported having multiple falls in the past 2 weeks, reportedly experiencing LOC at times. He reported + chest pain, worse with movement and pleuritic in nature; + chronic BLE edema, +left lower abdominal pain. Patient reported drinking 1 bottle of wine daily with a shot of scotch.      Hospital Course:  Patient was initially admitted to medical floor for CHF exacerbation, afib with RVR, alcohol withdrawal and sepsis 2/2 cellulitis. However, s/p RRT for respiratory distress and lethargy requiring intubation. Patient remains sedated/intubated; on IV abx for aspiration PNA and pressor. Request to identify surrogate and establish goals of care. Patient is a full code.    PAST MEDICAL & SURGICAL HISTORY:  HTN (hypertension)  Atrial fibrillation  No significant past surgical history    SOCIAL HISTORY:   lives alone; retired  Admitted from:  home  Substance abuse history:  unknown                                    Tobacco hx: Active, 1ppd for 30+  Alcohol hx:  1 bottle of wine and shot of scotch/day            Home Opioid hx: unknown  Mandaeism:  no Yarsani affiliation                                         Preferred Language: English    Surrogate/HCP/Guardian:   Juliann Andrade (sister): [phone] /[phone]  Radha Early (sister): [phone]    FAMILY HISTORY:  No pertinent family history in first degree relatives  No further significant family history reported    Baseline ADLs (prior to admission): lives alone, ambulatory, + falls, independent of ADLs, alert and oriented     No Known Allergies    Present Symptoms:         Review of Systems:Unable to obtain- patient sedated/intubated; minimally responsive to tactile stimuli    MEDICATIONS  (STANDING):  aspirin  chewable 81 milliGRAM(s) Oral daily  atorvastatin 40 milliGRAM(s) Oral at bedtime  chlorhexidine 0.12% Liquid 15 milliLiter(s) Oral Mucosa every 12 hours  chlorhexidine 4% Liquid 1 Application(s) Topical daily  chlorhexidine 4% Liquid 1 Application(s) Topical <User Schedule>  dexMEDEtomidine Infusion 0.7 MICROgram(s)/kG/Hr (21.875 mL/Hr) IV Continuous <Continuous>  folic acid 1 milliGRAM(s) Oral daily  insulin lispro (HumaLOG) corrective regimen sliding scale   SubCutaneous every 6 hours  multivitamin 1 Tablet(s) Oral daily  norepinephrine Infusion 0.05 MICROgram(s)/kG/Min (5.859 mL/Hr) IV Continuous <Continuous>  nystatin Powder 1 Application(s) Topical three times a day  pantoprazole   Suspension 40 milliGRAM(s) Enteral Tube daily  piperacillin/tazobactam IVPB.. 3.375 Gram(s) IV Intermittent every 8 hours  sodium chloride 0.9% Bolus 1000 milliLiter(s) IV Bolus once  thiamine IVPB 500 milliGRAM(s) IV Intermittent daily  vancomycin  IVPB 1000 milliGRAM(s) IV Intermittent <User Schedule>  vancomycin  IVPB 1000 milliGRAM(s) IV Intermittent once    MEDICATIONS  (PRN):  PHENobarbital Injectable 130 milliGRAM(s) IV Push every 6 hours PRN agitation  sodium chloride 0.9% lock flush 10 milliLiter(s) IV Push every 1 hour PRN Pre/post blood products, medications, blood draw, and to maintain line patency    PHYSICAL EXAM:    Vital Signs Last 24 Hrs  T(C): 38 (2020 16:30), Max: 41.1 (2020 21:30)  T(F): 100.4 (2020 16:30), Max: 105.9 (2020 21:30)  HR: 91 (2020 16:30) (83 - 129)  BP: 100/55 (2020 16:00) (58/40 - 134/79)  BP(mean): 65 (2020 16:00) (44 - 94)  RR: 12 (2020 16:30) (11 - 42)  SpO2: 96% (2020 16:30) (90% - 100%)    General: patient sedated/intubated; minimally responsive to tactile stimuli, unable to follow commands. No distress.   Karnofsky Performance Score/Palliative Performance Status Version2:     20%    HEENT:  dry mouth  ET tube   Lungs: comfortable, on vent support. On precedex; phenobarbital PRN  CV: S1S2, IRR. tachycardia. On pressor  GI: soft, NTND, incontinent  :   [place]  Musculoskeletal: ambulatory at baseline; patient now sedated/intubated; minimally responsive to pain, unable to follow commands  Skin: Bilateral Feet mottling; L. Gluteal intact DTI  Neuro: patient sedated/intubated; minimally responsive to tactile stimuli, unable to follow commands  Oral intake ability: unable/only mouth care    Diet: NPO; TF via NG tube    LABS:                        15.4   6.79  )-----------( 52       ( 2020 05:51 )             48.9     -    147<H>  |  108  |  70<H>  ----------------------------<  102<H>  3.8   |  27  |  1.64<H>    Ca    10.7<H>      2020 05:51  Phos  5.2       Mg     2.6           Urinalysis Basic - ( 2020 12:15 )    Color: Marleny / Appearance: very cloudy / S.025 / pH: x  Gluc: x / Ketone: Trace  / Bili: Moderate / Urobili: 4   Blood: x / Protein: 100 / Nitrite: Positive   Leuk Esterase: Trace / RBC: See Note /HPF / WBC 0-2 /HPF   Sq Epi: x / Non Sq Epi: Occasional /HPF / Bacteria: Many /HPF    Albumin: 2.2    RADIOLOGY & ADDITIONAL STUDIES:  < from: CT Abdomen and Pelvis w/ IV Cont (20 @ 20:04) >  EXAM:  CT ABDOMEN AND PELVIS IC                        PROCEDURE DATE:  2020    INTERPRETATION:  CT ABDOMEN AND PELVIS WITH IV CONTRAST  CLINICAL INFORMATION: lower abdominal pain, swelling, fever  COMPARISON: None.  PROCEDURE:   CT of the Abdomen and Pelvis was performed with intravenous contrast.  Intravenous contrast: 90 cc Omnipaque 350  Oral contrast: None.  Sagittal and coronal reformats were performed.  FINDINGS:  LOWER CHEST: Clear.  LIVER: Vague low-attenuation lesions throughout the liver with nodular contour.  BILE DUCTS: Nondilated.  GALLBLADDER: Normal.  SPLEEN: Normal.  PANCREAS: Normal.  ADRENALS: Mild bilateral nodularity.  KIDNEYS/URETERS: No calculi, hydronephrosis, or renal mass.  BLADDER: Normal.  REPRODUCTIVE ORGANS: Unremarkable prostate and seminal vesicles.  BOWEL: Short left mid abdominal intussusception, likely transient. No bowel obstruction. No evidence of diverticulitis or colitis.  PERITONEUM: No free air or ascites.  VESSELS:  Normal caliber aorta.  RETROPERITONEUM/LYMPH NODES: No adenopathy.    ABDOMINAL WALL: Soft tissue edema lower abdominal wall.  BONES: No aggressive lesion.    IMPRESSION:   No acute pathology. No diverticulitis or colitis.  Vague low-attenuation lesions throughout the liver with nodular contour. While findings may be related to cirrhosis, there are no ancillary findings of cirrhosis or portal hypertension. Diffuse metastatic disease to the liver could have a similar appearance. Follow-up MRI abdomen with and without contrast.  Soft tissue edema lower abdominal wall. Correlate for cellulitis.  < end of copied text >    < from: US Abdomen Limited (20 @ 14:26) >  EXAM:  US ABDOMEN LIMITED                        PROCEDURE DATE:  2020    INTERPRETATION:  History: Elevated LFTs.  Right upper quadrant ultrasound.  Gallbladder contracted and not well visualized. No gross gallstones. No significant biliary dilatation. Common duct 0.6 cm. Liver is enlarged up to a 24.6 cm diffusely heterogeneous with multiple hyperechoic lesions suggestive of metastatic disease. Recommend contrast-enhanced MR for better evaluation. Pancreas not adequately visualized. Right kidney 11.7 cm unremarkable. No ascites.    Impression: Contracted gallbladder limiting evaluation.  No biliary dilatation. Enlarged heterogeneous liver with multiple lesions suggestive of metastatic disease. Correlate with MR.  < end of copied text >    < from: Xray Chest 1 View- PORTABLE-Urgent (20 @ 02:13) >  EXAM:  XR CHEST PORTABLE URGENT 1V                        PROCEDURE DATE:  2020    INTERPRETATION:  CLINICAL INDICATION: 69 years  Male with center line placement.  COMPARISON: 2020  AP view the chest is rotated to the right. There is right upper lobe soft tissue prominence suggestive of infiltrate, atelectasis or mass. There is mild pulmonary vascular congestion. No focal consolidations present. There is no right pleural effusion. A small left pleural effusion is reidentified. The heart is mildly enlarged  The left hilum is clear.  The tip of the ET tube is in the mid trachea. The tip of the NG tube is below the diaphragm. There is a right midline catheter with its tip overlying the expected location of the right subclavian vein. There is no pleural effusion. There is no pneumothorax.  Mild thoracic degenerative changes are present.    IMPRESSION:  Right midline catheter, ET tube and NG tube in satisfactory position.  Right upper lobe opacity suggestive of atelectasis, infiltrate or mass. Small left pleural effusion.  Cardiomegaly.  < end of copied text >    < from: Transthoracic Echocardiogram (20 @ 11:37) >  Patient name: CONNER FLORES  YOB: 1950   Age: 69 (M)   MR#: [id number]  Study Date: 2020  [id number]: [facility]  Study quality: Technically good  Referring Physician: Nicole Mccallum MD  Blood Pressure: 110/73 mmHg  Height: 180 cm  Weight: 125 kg  BSA: 2.4 m2  ------------------------------------------------------------------------    PROCEDURE: Transthoracic echocardiogram with 2-D, M-Mode  and complete spectral and color flow Doppler.  INDICATION: Abnormal electrocardiogram [ECG] [EKG] (R94.31)  HISTORY:  ------------------------------------------------------------------------  DIMENSIONS:  Dimensions:     Normal Values:  LA:     5.5 cm    2.0 - 4.0 cm  Ao:     3.7 cm    2.0 - 3.8 cm  SEPTUM: 1.4 cm    0.6 - 1.2 cm  PWT:    1.4 cm    0.6 - 1.1 cm  LVIDd:  5.3 cm    3.0 - 5.6 cm  LVIDs:  3.7 cm    1.8 - 4.0 cm      Derived Variables:  LVMI: 132 g/m2  RWT: 0.52  Ejection Fraction Visual Estimate: 55-60 %    ------------------------------------------------------------------------  OBSERVATIONS:  Mitral Valve: Mitral annular calcification.  Aortic Root: Aortic Root: 3.7 cm.    Aortic Valve: Calcified trileaflet aortic valve with normal  opening.  Left Atrium: Normal left atrium.  LA volume index = 33  cc/m2.  Left Ventricle: Normal Left Ventricular Systolic Function,  (EF = 55 to 60%) Mild concentric left ventricular  hypertrophy. Grade II diastolic dysfunction.  Right Heart: Normal right atrium. Normal right ventricular  size and function. There is mild tricuspid regurgitation.  Pulmonic valve not well seen.  Pericardium/PleuraNormal pericardium with no pericardial  effusion.  Hemodynamic: Unable to estimate RVSP.  ------------------------------------------------------------------------  CONCLUSIONS:  1. Mitral annular calcification.  2. Calcified trileaflet aortic valve with normal opening.  3. Aortic Root: 3.7 cm.  4. Normal left atrium.  LA volume index = 33 cc/m2.  5. Mild concentric left ventricular hypertrophy.  6.Normal Left Ventricular Systolic Function,  (EF = 55 to  60%)  7. Grade II diastolic dysfunction.  8. Normal right atrium.  9. Normal right ventricular size and function.  10. Unable to estimate RVSP.  11. There is mild tricuspid regurgitation.  12. Pulmonic valve not well seen.  13. Normal pericardium with no pericardial effusion.  < end of copied text >      ADVANCE DIRECTIVES:  Full code. HPI:  68 y/o male with PMHx of afib (on Pradaxa), HTN and alcohol abuse presented to the ED with chief complaint of weakness and shortness of breath. Patient was AAOx2 and a vague historian. He reported having multiple falls in the past 2 weeks, reportedly experiencing LOC at times. He reported + chest pain, worse with movement and pleuritic in nature; + chronic BLE edema, +left lower abdominal pain. Patient reported drinking 1 bottle of wine daily with a shot of scotch.      Hospital Course:  Patient was initially admitted to medical floor for CHF exacerbation, afib with RVR, alcohol withdrawal and sepsis 2/2 cellulitis. However, s/p RRT for respiratory distress and lethargy requiring intubation. Patient remains sedated/intubated; on IV abx for aspiration PNA and pressor. Request to identify surrogate and establish goals of care. Patient is a full code.    PAST MEDICAL & SURGICAL HISTORY:  HTN (hypertension)  Atrial fibrillation  No significant past surgical history    SOCIAL HISTORY:   lives alone; retired  Admitted from:  home  Substance abuse history:  unknown                                    Tobacco hx: Active, 1ppd for 30+  Alcohol hx:  1 bottle of wine and shot of scotch/day            Home Opioid hx: unknown  Episcopalian:  no Baptism affiliation                                         Preferred Language: English    Surrogate/HCP/Guardian:   Juliann Andrade (sister): [phone] /[phone]  Radha Early (sister): [phone]    FAMILY HISTORY:  No pertinent family history in first degree relatives  No further significant family history reported    Baseline ADLs (prior to admission): lives alone, ambulatory, + falls, independent of ADLs, alert and oriented     No Known Allergies    Present Symptoms:         Review of Systems:Unable to obtain- patient sedated/intubated; minimally responsive to tactile stimuli    MEDICATIONS  (STANDING):  aspirin  chewable 81 milliGRAM(s) Oral daily  atorvastatin 40 milliGRAM(s) Oral at bedtime  chlorhexidine 0.12% Liquid 15 milliLiter(s) Oral Mucosa every 12 hours  chlorhexidine 4% Liquid 1 Application(s) Topical daily  chlorhexidine 4% Liquid 1 Application(s) Topical <User Schedule>  dexMEDEtomidine Infusion 0.7 MICROgram(s)/kG/Hr (21.875 mL/Hr) IV Continuous <Continuous>  folic acid 1 milliGRAM(s) Oral daily  insulin lispro (HumaLOG) corrective regimen sliding scale   SubCutaneous every 6 hours  multivitamin 1 Tablet(s) Oral daily  norepinephrine Infusion 0.05 MICROgram(s)/kG/Min (5.859 mL/Hr) IV Continuous <Continuous>  nystatin Powder 1 Application(s) Topical three times a day  pantoprazole   Suspension 40 milliGRAM(s) Enteral Tube daily  piperacillin/tazobactam IVPB.. 3.375 Gram(s) IV Intermittent every 8 hours  sodium chloride 0.9% Bolus 1000 milliLiter(s) IV Bolus once  thiamine IVPB 500 milliGRAM(s) IV Intermittent daily  vancomycin  IVPB 1000 milliGRAM(s) IV Intermittent <User Schedule>  vancomycin  IVPB 1000 milliGRAM(s) IV Intermittent once    MEDICATIONS  (PRN):  PHENobarbital Injectable 130 milliGRAM(s) IV Push every 6 hours PRN agitation  sodium chloride 0.9% lock flush 10 milliLiter(s) IV Push every 1 hour PRN Pre/post blood products, medications, blood draw, and to maintain line patency    PHYSICAL EXAM:    Vital Signs Last 24 Hrs  T(C): 38 (2020 16:30), Max: 41.1 (2020 21:30)  T(F): 100.4 (2020 16:30), Max: 105.9 (2020 21:30)  HR: 91 (2020 16:30) (83 - 129)  BP: 100/55 (2020 16:00) (58/40 - 134/79)  BP(mean): 65 (2020 16:00) (44 - 94)  RR: 12 (2020 16:30) (11 - 42)  SpO2: 96% (2020 16:30) (90% - 100%)    General: patient sedated/intubated; minimally responsive to tactile stimuli, unable to follow commands. No distress.   Karnofsky Performance Score/Palliative Performance Status Version2:     20%    HEENT:  dry mouth  ET tube   Lungs: on full vent support. On precedex; phenobarbital PRN  CV: S1S2, IRR. tachycardia. On pressor  GI: soft, NTND, incontinent  : mansfield cath  Musculoskeletal: ambulatory at baseline; patient now sedated/intubated; minimally responsive to pain, unable to follow commands  Skin: Bilateral Feet mottling; L. Gluteal intact but DTI  Neuro: patient sedated/intubated; minimally responsive to tactile stimuli  Oral intake ability: unable/only mouth care    Diet: NPO; TF via NG tube    LABS:                        15.4   6.79  )-----------( 52       ( 2020 05:51 )             48.9     -    147<H>  |  108  |  70<H>  ----------------------------<  102<H>  3.8   |  27  |  1.64<H>    Ca    10.7<H>      2020 05:51  Phos  5.2       Mg     2.6           Urinalysis Basic - ( 2020 12:15 )    Color: Marleny / Appearance: very cloudy / S.025 / pH: x  Gluc: x / Ketone: Trace  / Bili: Moderate / Urobili: 4   Blood: x / Protein: 100 / Nitrite: Positive   Leuk Esterase: Trace / RBC: See Note /HPF / WBC 0-2 /HPF   Sq Epi: x / Non Sq Epi: Occasional /HPF / Bacteria: Many /HPF    Albumin: 2.2    RADIOLOGY & ADDITIONAL STUDIES:  < from: CT Abdomen and Pelvis w/ IV Cont (20 @ 20:04) >  EXAM:  CT ABDOMEN AND PELVIS IC                        PROCEDURE DATE:  2020    INTERPRETATION:  CT ABDOMEN AND PELVIS WITH IV CONTRAST  CLINICAL INFORMATION: lower abdominal pain, swelling, fever  COMPARISON: None.  PROCEDURE:   CT of the Abdomen and Pelvis was performed with intravenous contrast.  Intravenous contrast: 90 cc Omnipaque 350  Oral contrast: None.  Sagittal and coronal reformats were performed.  FINDINGS:  LOWER CHEST: Clear.  LIVER: Vague low-attenuation lesions throughout the liver with nodular contour.  BILE DUCTS: Nondilated.  GALLBLADDER: Normal.  SPLEEN: Normal.  PANCREAS: Normal.  ADRENALS: Mild bilateral nodularity.  KIDNEYS/URETERS: No calculi, hydronephrosis, or renal mass.  BLADDER: Normal.  REPRODUCTIVE ORGANS: Unremarkable prostate and seminal vesicles.  BOWEL: Short left mid abdominal intussusception, likely transient. No bowel obstruction. No evidence of diverticulitis or colitis.  PERITONEUM: No free air or ascites.  VESSELS:  Normal caliber aorta.  RETROPERITONEUM/LYMPH NODES: No adenopathy.    ABDOMINAL WALL: Soft tissue edema lower abdominal wall.  BONES: No aggressive lesion.    IMPRESSION:   No acute pathology. No diverticulitis or colitis.  Vague low-attenuation lesions throughout the liver with nodular contour. While findings may be related to cirrhosis, there are no ancillary findings of cirrhosis or portal hypertension. Diffuse metastatic disease to the liver could have a similar appearance. Follow-up MRI abdomen with and without contrast.  Soft tissue edema lower abdominal wall. Correlate for cellulitis.  < end of copied text >    < from: US Abdomen Limited (20 @ 14:26) >  EXAM:  US ABDOMEN LIMITED                        PROCEDURE DATE:  2020    INTERPRETATION:  History: Elevated LFTs.  Right upper quadrant ultrasound.  Gallbladder contracted and not well visualized. No gross gallstones. No significant biliary dilatation. Common duct 0.6 cm. Liver is enlarged up to a 24.6 cm diffusely heterogeneous with multiple hyperechoic lesions suggestive of metastatic disease. Recommend contrast-enhanced MR for better evaluation. Pancreas not adequately visualized. Right kidney 11.7 cm unremarkable. No ascites.    Impression: Contracted gallbladder limiting evaluation.  No biliary dilatation. Enlarged heterogeneous liver with multiple lesions suggestive of metastatic disease. Correlate with MR.  < end of copied text >    < from: Xray Chest 1 View- PORTABLE-Urgent (20 @ 02:13) >  EXAM:  XR CHEST PORTABLE URGENT 1V                        PROCEDURE DATE:  2020    INTERPRETATION:  CLINICAL INDICATION: 69 years  Male with center line placement.  COMPARISON: 2020  AP view the chest is rotated to the right. There is right upper lobe soft tissue prominence suggestive of infiltrate, atelectasis or mass. There is mild pulmonary vascular congestion. No focal consolidations present. There is no right pleural effusion. A small left pleural effusion is reidentified. The heart is mildly enlarged  The left hilum is clear.  The tip of the ET tube is in the mid trachea. The tip of the NG tube is below the diaphragm. There is a right midline catheter with its tip overlying the expected location of the right subclavian vein. There is no pleural effusion. There is no pneumothorax.  Mild thoracic degenerative changes are present.    IMPRESSION:  Right midline catheter, ET tube and NG tube in satisfactory position.  Right upper lobe opacity suggestive of atelectasis, infiltrate or mass. Small left pleural effusion.  Cardiomegaly.  < end of copied text >    < from: Transthoracic Echocardiogram (20 @ 11:37) >  Patient name: CONNER FLORES  YOB: 1950   Age: 69 (M)   MR#: [id number]  Study Date: 2020  [id number] [facility]  Study quality: Technically good  Referring Physician: Nicole Mccallum MD  Blood Pressure: 110/73 mmHg  Height: 180 cm  Weight: 125 kg  BSA: 2.4 m2  ------------------------------------------------------------------------    PROCEDURE: Transthoracic echocardiogram with 2-D, M-Mode  and complete spectral and color flow Doppler.  INDICATION: Abnormal electrocardiogram [ECG] [EKG] (R94.31)  HISTORY:  ------------------------------------------------------------------------  DIMENSIONS:  Dimensions:     Normal Values:  LA:     5.5 cm    2.0 - 4.0 cm  Ao:     3.7 cm    2.0 - 3.8 cm  SEPTUM: 1.4 cm    0.6 - 1.2 cm  PWT:    1.4 cm    0.6 - 1.1 cm  LVIDd:  5.3 cm    3.0 - 5.6 cm  LVIDs:  3.7 cm    1.8 - 4.0 cm      Derived Variables:  LVMI: 132 g/m2  RWT: 0.52  Ejection Fraction Visual Estimate: 55-60 %    ------------------------------------------------------------------------  OBSERVATIONS:  Mitral Valve: Mitral annular calcification.  Aortic Root: Aortic Root: 3.7 cm.    Aortic Valve: Calcified trileaflet aortic valve with normal  opening.  Left Atrium: Normal left atrium.  LA volume index = 33  cc/m2.  Left Ventricle: Normal Left Ventricular Systolic Function,  (EF = 55 to 60%) Mild concentric left ventricular  hypertrophy. Grade II diastolic dysfunction.  Right Heart: Normal right atrium. Normal right ventricular  size and function. There is mild tricuspid regurgitation.  Pulmonic valve not well seen.  Pericardium/PleuraNormal pericardium with no pericardial  effusion.  Hemodynamic: Unable to estimate RVSP.  ------------------------------------------------------------------------  CONCLUSIONS:  1. Mitral annular calcification.  2. Calcified trileaflet aortic valve with normal opening.  3. Aortic Root: 3.7 cm.  4. Normal left atrium.  LA volume index = 33 cc/m2.  5. Mild concentric left ventricular hypertrophy.  6.Normal Left Ventricular Systolic Function,  (EF = 55 to  60%)  7. Grade II diastolic dysfunction.  8. Normal right atrium.  9. Normal right ventricular size and function.  10. Unable to estimate RVSP.  11. There is mild tricuspid regurgitation.  12. Pulmonic valve not well seen.  13. Normal pericardium with no pericardial effusion.  < end of copied text >      ADVANCE DIRECTIVES:  Full code.

## 2020-01-09 NOTE — CONSULT NOTE ADULT - PROBLEM SELECTOR RECOMMENDATION 4
Per family report, patient is retired, lives alone, ambulatory, + falls, independent of ADLs, + active smoker, ETOH abuse at baseline. Patient now sedated/intubated; with MOF, + shock on pressor. Prognosis guarded. Per family report, patient is retired, lives alone, ambulatory, + falls, independent of ADLs, + active smoker, ETOH dependence at baseline. Patient now sedated/intubated; with MOF, + shock on pressor. Prognosis guarded.

## 2020-01-09 NOTE — PROGRESS NOTE ADULT - SUBJECTIVE AND OBJECTIVE BOX
INTERVAL HPI/OVERNIGHT EVENTS: Pt spiked fever since last night. Fio2 was increased to 60% last night and was tappered to 40% today. Creatinine levels trended up 1.7. Repeat CXR @ shows worsened RUL infiltrate.    PRESSORS: [ x] YES [ ] NO  WHICH: NE    ANTIBIOTICS:                  DATE STARTED:  ANTIBIOTICS:                  DATE STARTED:  ANTIBIOTICS:                  DATE STARTED:    Antimicrobial:  piperacillin/tazobactam IVPB.. 3.375 Gram(s) IV Intermittent every 8 hours  vancomycin  IVPB 1000 milliGRAM(s) IV Intermittent <User Schedule>  vancomycin  IVPB 1000 milliGRAM(s) IV Intermittent once    Cardiovascular:  metoprolol tartrate 25 milliGRAM(s) Oral two times a day  norepinephrine Infusion 0.05 MICROgram(s)/kG/Min IV Continuous <Continuous>    Pulmonary:    Hematalogic:  aspirin  chewable 81 milliGRAM(s) Oral daily    Other:  atorvastatin 40 milliGRAM(s) Oral at bedtime  chlorhexidine 0.12% Liquid 15 milliLiter(s) Oral Mucosa every 12 hours  chlorhexidine 4% Liquid 1 Application(s) Topical daily  chlorhexidine 4% Liquid 1 Application(s) Topical <User Schedule>  dexMEDEtomidine Infusion 0.7 MICROgram(s)/kG/Hr IV Continuous <Continuous>  folic acid 1 milliGRAM(s) Oral daily  insulin lispro (HumaLOG) corrective regimen sliding scale   SubCutaneous every 6 hours  multivitamin 1 Tablet(s) Oral daily  nystatin Powder 1 Application(s) Topical three times a day  pantoprazole   Suspension 40 milliGRAM(s) Enteral Tube daily  PHENobarbital IVPB 130 milliGRAM(s) IV Intermittent every 6 hours PRN  sodium chloride 0.9% Bolus 1000 milliLiter(s) IV Bolus once  sodium chloride 0.9% lock flush 10 milliLiter(s) IV Push every 1 hour PRN  thiamine IVPB 500 milliGRAM(s) IV Intermittent daily    aspirin  chewable 81 milliGRAM(s) Oral daily  atorvastatin 40 milliGRAM(s) Oral at bedtime  chlorhexidine 0.12% Liquid 15 milliLiter(s) Oral Mucosa every 12 hours  chlorhexidine 4% Liquid 1 Application(s) Topical daily  chlorhexidine 4% Liquid 1 Application(s) Topical <User Schedule>  dexMEDEtomidine Infusion 0.7 MICROgram(s)/kG/Hr IV Continuous <Continuous>  folic acid 1 milliGRAM(s) Oral daily  insulin lispro (HumaLOG) corrective regimen sliding scale   SubCutaneous every 6 hours  metoprolol tartrate 25 milliGRAM(s) Oral two times a day  multivitamin 1 Tablet(s) Oral daily  norepinephrine Infusion 0.05 MICROgram(s)/kG/Min IV Continuous <Continuous>  nystatin Powder 1 Application(s) Topical three times a day  pantoprazole   Suspension 40 milliGRAM(s) Enteral Tube daily  PHENobarbital IVPB 130 milliGRAM(s) IV Intermittent every 6 hours PRN  piperacillin/tazobactam IVPB.. 3.375 Gram(s) IV Intermittent every 8 hours  sodium chloride 0.9% Bolus 1000 milliLiter(s) IV Bolus once  sodium chloride 0.9% lock flush 10 milliLiter(s) IV Push every 1 hour PRN  thiamine IVPB 500 milliGRAM(s) IV Intermittent daily  vancomycin  IVPB 1000 milliGRAM(s) IV Intermittent <User Schedule>  vancomycin  IVPB 1000 milliGRAM(s) IV Intermittent once    Drug Dosing Weight  Height (cm): 180.34 (2020 15:39)  Weight (kg): 125 (2020 11:30)  BMI (kg/m2): 38.4 (2020 11:30)  BSA (m2): 2.42 (2020 11:30)    CENTRAL LINE: [ X] YES [ ] NO  LOCATION:         VILLATORO: [X ] YES [ ] NO          A-LINE:  [ X] YES [ ] NO  LOCATION:             ICU Vital Signs Last 24 Hrs  T(C): 38.5 (2020 12:00), Max: 41.1 (2020 21:30)  T(F): 101.3 (2020 12:00), Max: 105.9 (2020 21:30)  HR: 97 (2020 13:00) (83 - 129)  BP: 115/73 (2020 13:00) (58/40 - 134/79)  BP(mean): 82 (2020 13:00) (44 - 94)  ABP: 123/77 (2020 13:00) (116/68 - 148/88)  ABP(mean): 95 (2020 13:00) (78 - 111)  RR: 14 (2020 13:00) (11 - 42)  SpO2: 93% (2020 13:00) (90% - 100%)      ABG - ( 2020 01:17 )  pH, Arterial: 7.39  pH, Blood: x     /  pCO2: 47    /  pO2: 239   / HCO3: 28    / Base Excess: 2.9   /  SaO2: 98                     @ 07:01  -   @ 07:00  --------------------------------------------------------  IN: 2864.5 mL / OUT: 940 mL / NET: 1924.5 mL        Mode: CPAP with PS  FiO2: 40  PEEP: 5  PS: 10  ITime: 1      REVIEW OF SYSTEMS:    Could not be assessed as the pt is  sedated for Intubated  PHYSICAL EXAM:    GENERAL: Obese. sedated for Intubation  EYES: EOMI, PERRLA,   NECK: Supple, No JVD; Normal thyroid; Trachea midline RSC line and A line placed  NERVOUS SYSTEM: sedated for intubation  CHEST/LUNG: No rales, rhonchi, wheezing   HEART: Regular rate and rhythm; No murmurs,   ABDOMEN: Soft, mass palpable in R middle quadrant   EXTREMITIES:  2+ Peripheral Pulses, show mottled apperance in both b/l lower extremities        LABS:  CBC Full  -  ( 2020 05:51 )  WBC Count : 6.79 K/uL  RBC Count : 4.82 M/uL  Hemoglobin : 15.4 g/dL  Hematocrit : 48.9 %  Platelet Count - Automated : 52 K/uL  Mean Cell Volume : 101.5 fl  Mean Cell Hemoglobin : 32.0 pg  Mean Cell Hemoglobin Concentration : 31.5 gm/dL  Auto Neutrophil # : x  Auto Lymphocyte # : x  Auto Monocyte # : x  Auto Eosinophil # : x  Auto Basophil # : x  Auto Neutrophil % : x  Auto Lymphocyte % : x  Auto Monocyte % : x  Auto Eosinophil % : x  Auto Basophil % : x        147<H>  |  108  |  70<H>  ----------------------------<  102<H>  3.8   |  27  |  1.64<H>    Ca    10.7<H>      2020 05:51  Phos  5.2       Mg     2.6           PTT - ( 2020 18:25 )  PTT:>200.0 sec  Urinalysis Basic - ( 2020 12:15 )    Color: Marleny / Appearance: very cloudy / S.025 / pH: x  Gluc: x / Ketone: Trace  / Bili: Moderate / Urobili: 4   Blood: x / Protein: 100 / Nitrite: Positive   Leuk Esterase: Trace / RBC: See Note /HPF / WBC 0-2 /HPF   Sq Epi: x / Non Sq Epi: Occasional /HPF / Bacteria: Many /HPF          RADIOLOGY & ADDITIONAL STUDIES REVIEWED:    < from: Xray Chest 1 View- PORTABLE-Urgent (20 @ 02:13) >  IMPRESSION:    Right midline catheter, ET tube and NG tube in satisfactory position.    Right upper lobe opacity suggestive of atelectasis, infiltrate or mass. Small left pleural effusion.    Cardiomegaly.    < end of copied text >      [ ]GOALS OF CARE DISCUSSION WITH PATIENT/FAMILY/PROXY:    CRITICAL CARE TIME SPENT: 35 minutes

## 2020-01-09 NOTE — PROGRESS NOTE ADULT - ATTENDING COMMENTS
Patient seen and examined with resident, Addendum to above.    69 yr old man with afib (on Pradaxa), HTN and alcohol abuse presents to the ED with chief complaint of weakness and shortness of breath. Patient is initially admitted to medical floor for CHF exacerbation, afib with RVR, alcohol withdrawal.  RRT was called for respiratory distress and lethargy, patient was minimally arousable to sternal rub, not able to follow commands.  + ASP PNA on antibx. . intubated 1/5. Overnight febrile and hypotensive. Antibiotic coverage broadened and started on vasopressor support. Chest xray with right upper lobe atelectasis concern for possible mucus plugging.     Assessment;  1. Acute respiratory failure  2. Alcohol withdrawal syndrome   3. Atrial fibrillation   4. Heart failure with preserved EF   5. Thrombocytopenia   6. Cirrhosis   7. REI     - Cont. mechanical ventilation, titrate down Fio2  - Daily awakening and weaning trials   - Monitor for signs of withdrawal  - Cont. Precedex and Ativan prn per CIWA  - Thiamine and Folate supplementation   - Cont. tube feeds   - Cont. antibiotics, f/u blood cultures. UA negative as such unlikely UTI.   - Monitor for signs of bleeding, holding heparin given thrombocytopenia   - IV fluid resuscitation  - Monitor urine output, urine studies   - Overall prognosis is guarded at this time  - Palliative care consult   - DVT prophylaxis with SCD, and stress ulcer prophylaxis

## 2020-01-09 NOTE — PROGRESS NOTE ADULT - ASSESSMENT
A/P:    REI:  Possible ATN from hypotension, pt noted with elevation in renal function and decrease in urine output. Monitor renal function daily    HYPONATERMIA: Hypervolemic, most likley secondary to chf/liver ds induced  -Na improved to within normal limits. Will continue to monitor   -suggest to continue current care with diuretics and fluid restriction to 1000 ml per day  -f/u Na level daily    HYPERCALCEMIA: improving   -suggest to continue Lasix   -f/u ca level daily  -suggest malignancy w/u if ca remains high    EDEMA:multifactorial like chf/liver ds  -avoid iv fluid    Rest as per micu team A/P:    REI: r/o pre -renal azotemia(u osmo is high) , less likley ATN yet ,from hypotension/sepsis  -suggest to keep sbp>110 to avoid further renal injury   -Keep patient euvolemic and renal diet  -Avoid Nephrotoxic Meds/ Agents such as (NSAIDs, IV contrast, Aminoglycosides such as gentamicin, -Gadolinium contrast, Phosphate containing enemas, etc..)  -Adjust Medications according to eGFR  -f/u bmp daily      s/p HYPONATERMIA: Hypervolemic, most likley secondary to chf/liver ds induced  -Na improved to within normal limits. Will continue to monitor   -f/u Na level daily    HYPERCALCEMIA: improving   -suggest to get Vit D2/D3 level  ,ace level and Pth-related  protein  -f/u ca level daily  -suggest endocrine consult    EDEMA:multifactorial like chf/liver ds  -avoid iv fluid    HYPERPHOSPHATEMIA:mild ,secondary to rei  -f/u phos level q 48 hrs    Rest as per micu team

## 2020-01-09 NOTE — PROGRESS NOTE ADULT - ASSESSMENT
68 y/o male with PMHx of afib (on Pradaxa), HTN and alcohol abuse presents to the ED with chief complaint of weakness and shortness of breath. Patient is initially admitted to medical floor for CHF exacerbation, afib with RVR, alcohol withdrawal and sepsis 2/2 cellulitis. Patient is AAOx2-3 at baseline, noted lethargic by nurse this morning. Patient is on CIWA protocol and received total 8 mg of ativan Iv push in last 24 hours. RRT was called for respiratory distress and lethargy, patient is minimally arousable to sternal rub, not able to follow commands.     Plan:     Neuro:   AAOx2-3 at baseline   Noted to be Altered mental status on admission likely from hypercarbia and medication induced (on ativan as per CIWA)  On Ativan PRN   Intubated in ICU on 1/5     Respi:   Acute hypercapnic respiratory failure:   likely from combination of CHF and medication induced form ativan   Pt spo2 were noted to be low on 1/8 night, fio2 increased to 60% now tappered to 40%.  ABG - 1/8 - PH 7.39  Started on ETT on 1/5.     CVS:   # Afib:   history of Afib   rate controlled with metoprolol, on pradexa for anticoagulation   will hold pradexa for now   held Heparin drip from Thrombocytopenia.    CHF:  admitted with CHF exacerbation   d/c lasix   stict I/O and daily weight   echo- EF - 55%    GI:   Tube fed  NG tube in place   # Transaminitis:    /  on admission -stable   tbili 4.3 on admission  etiology unclear at this time  hepatitis panel - negative   CT abdomen shows now CBD dilatation, but does show hypodense lesions  USG abd- Contracted bladder  GI consulted - Dr. Martel.    Heme:  # Thrombocytopenia.    Platelets on admission 59 -> 55 -> 44->48->52  likely 2/2 to sepsis  trend daily CBC     Endo:   #DM:   HbA1c of 6.5  sliding scale q6 while NPO     ID:  # cellulitis:   Fever- 105F today   started on Vanco, zosyn  Vt tmrw 8.00am  f/u bcx, UA, CXR, sputum Cx       Renal:   Creatine leve;s trended up tp 1.67  Nephro aware. Reccommended urine lytes  Hypercalcemia+ f/u vit D, PTHrp    PPx:   was on pradexa - holding   protonix IV for GI  prophylaxis 70 y/o male with PMHx of afib (on Pradaxa), HTN and alcohol abuse presents to the ED with chief complaint of weakness and shortness of breath. Patient is initially admitted to medical floor for CHF exacerbation, afib with RVR, alcohol withdrawal and sepsis 2/2 cellulitis. Patient is AAOx2-3 at baseline, noted lethargic by nurse this morning. Patient is on CIWA protocol and received total 8 mg of ativan Iv push in last 24 hours. RRT was called for respiratory distress and lethargy, patient is minimally arousable to sternal rub, not able to follow commands.     Plan:     Neuro:   AAOx2-3 at baseline   Noted to be Altered mental status on admission likely from hypercarbia and medication induced (on ativan as per CIWA)  changed to Phenobarbital instead of Ativan Prn  Intubated in ICU on 1/5     Respi:   Acute hypercapnic respiratory failure:   likely from combination of CHF and medication induced form ativan   Pt spo2 were noted to be low on 1/8 night, fio2 increased to 60% now tappered to 40%.  ABG - 1/8 - PH 7.39  Started on ETT on 1/5.     CVS:   # Afib:   history of Afib   rate controlled with metoprolol, on pradexa for anticoagulation   will hold pradexa for now   held Heparin drip from Thrombocytopenia.    CHF:  admitted with CHF exacerbation   d/c lasix   stict I/O and daily weight   echo- EF - 55%    GI:   Tube fed  NG tube in place   # Transaminitis:    /  on admission -stable   tbili 4.3 on admission  etiology unclear at this time  hepatitis panel - negative   CT abdomen shows now CBD dilatation, but does show hypodense lesions  USG abd- Contracted bladder  GI consulted - Dr. Martel.    Heme:  # Thrombocytopenia.    Platelets on admission 59 -> 55 -> 44->48->52  likely 2/2 to sepsis  trend daily CBC     Endo:   #DM:   HbA1c of 6.5  sliding scale q6 while NPO     ID:  # cellulitis:   Fever- 105F today   started on Vanco, zosyn  Vt tmrw 8.00am  f/u bcx, UA, CXR, sputum Cx       Renal:   Creatine leve;s trended up tp 1.67  Nephro aware. Reccommended urine lytes  Hypercalcemia+ f/u vit D, PTHrp    PPx:   was on pradexa - holding   protonix IV for GI  prophylaxis

## 2020-01-09 NOTE — ADVANCED PRACTICE NURSE CONSULT - ASSESSMENT
This is a 69yr old male patient admitted for COPD Exacerbation, presenting with the following:  -There is evidence of Bilateral Feet mottling  -There is evidence of L. Gluteal intact DTI (1cm x 0.3cm) without drainage  -There is evidence of Moisture Associated Skin Damage to the Scrotal and Groin area  -The patient is being treated for Sepsis, transaminitis, liver cirrhosis/metastasis, A. Fib, HTN, EtOH Withdrawal, respiratory failure, CHF, thrombocytopenia, diabetes, abdominal cellulitis  -The patient is on a low air loss bed with pressure injury prevention resources in place  -There is evidence of poor perfusion to the skin and possible skin failure This is a 69yr old male patient admitted for COPD Exacerbation, presenting with the following:  -There is evidence of Bilateral Feet mottling  -There is evidence of a purplish discoloration to the Perianal area, encompassing the Bilateral Gluteus without drainage  -There is evidence of Moisture Associated Skin Damage to the Scrotal and Groin area  -The patient is being treated for Sepsis, transaminitis, liver cirrhosis/metastasis, A. Fib, HTN, EtOH Withdrawal, respiratory failure, CHF, thrombocytopenia, diabetes, abdominal cellulitis  -The patient is on a low air loss bed with pressure injury prevention resources in place  -There is evidence of poor perfusion to the skin and possible skin failure

## 2020-01-09 NOTE — PROGRESS NOTE ADULT - SUBJECTIVE AND OBJECTIVE BOX
pt seen,  examined case discussed with renal attending    SUBJECTIVE:      REVIEW OF SYSTEMS:  Unable to get ros due to clinical condition    Current meds:    aspirin  chewable 81 milliGRAM(s) Oral daily  atorvastatin 40 milliGRAM(s) Oral at bedtime  chlorhexidine 0.12% Liquid 15 milliLiter(s) Oral Mucosa every 12 hours  chlorhexidine 4% Liquid 1 Application(s) Topical daily  chlorhexidine 4% Liquid 1 Application(s) Topical <User Schedule>  dexMEDEtomidine Infusion 0.7 MICROgram(s)/kG/Hr IV Continuous <Continuous>  folic acid 1 milliGRAM(s) Oral daily  insulin lispro (HumaLOG) corrective regimen sliding scale   SubCutaneous every 6 hours  multivitamin 1 Tablet(s) Oral daily  norepinephrine Infusion 0.05 MICROgram(s)/kG/Min IV Continuous <Continuous>  nystatin Powder 1 Application(s) Topical three times a day  pantoprazole   Suspension 40 milliGRAM(s) Enteral Tube daily  PHENobarbital Injectable 130 milliGRAM(s) IV Push every 6 hours PRN  piperacillin/tazobactam IVPB.. 3.375 Gram(s) IV Intermittent every 8 hours  sodium chloride 0.9% Bolus 1000 milliLiter(s) IV Bolus once  sodium chloride 0.9% lock flush 10 milliLiter(s) IV Push every 1 hour PRN  thiamine IVPB 500 milliGRAM(s) IV Intermittent daily  vancomycin  IVPB 1000 milliGRAM(s) IV Intermittent <User Schedule>  vancomycin  IVPB 1000 milliGRAM(s) IV Intermittent once      Vital Signs    T(F): 100.4 (20 @ 16:30), Max: 105.9 (20 @ 21:30)  HR: 91 (20 @ 16:30) (83 - 129)  BP: 100/55 (20 @ 16:00) (58/40 - 134/79)  ABP: 115/74 (20 @ 16:30) (112/71 - 148/88)  RR: 12 (20 @ 16:30) (11 - 42)  SpO2: 96% (20 @ 16:30) (90% - 100%)  Wt(kg): --  CVP(cm H2O): --  CO: --  PCWP: --    I and O's:     @ 07:01  -   @ 07:00  --------------------------------------------------------  IN:    dexmedetomidine Infusion: 84.1 mL    dexmedetomidine Infusion: 132 mL    heparin  Infusion.: 192 mL    Lactated Ringers IV Bolus: 500 mL    ns in tub fed  [id number]: 525 mL    propofol Infusion: 405 mL    propofol Infusion: 135 mL    Solution: 300 mL  Total IN: 2273.1 mL    OUT:    Indwelling Catheter - Urethral: 970 mL  Total OUT: 970 mL    Total NET: 1303.1 mL       @ 07: @ 07:00  --------------------------------------------------------  IN:    dexmedetomidine Infusion: 781.4 mL    Enteral Tube Flush: 130 mL    Glucerna 1.5: 40 mL    norepinephrine Infusion: 6 mL    norepinephrine Infusion: 134.6 mL    propofol Infusion: 22.5 mL    Sodium Chloride 0.9% IV Bolus: 1000 mL    Solution: 100 mL    Solution: 200 mL    Solution: 250 mL    Solution: 200 mL  Total IN: 2864.5 mL    OUT:    Indwelling Catheter - Urethral: 940 mL  Total OUT: 940 mL    Total NET: 1924.5 mL       @ 07: @ 16:58  --------------------------------------------------------  IN:    dexmedetomidine Infusion: 168.6 mL    dexmedetomidine Infusion: 50 mL    dexmedetomidine Infusion: 25 mL    Glucerna 1.5: 230 mL    norepinephrine Infusion: 93.2 mL    Sodium Chloride 0.9% IV Bolus: 1000 mL    Solution: 250 mL    Solution: 100 mL  Total IN: 1916.8 mL    OUT:    Indwelling Catheter - Urethral: 580 mL  Total OUT: 580 mL    Total NET: 1336.8 mL        Daily     Daily Weight in k.7 (2020 07:00)        LABS:    CBC:                          15.4   6.79  )-----------( 52       ( 2020 05:51 )             48.9           BMP:        147<H>  |  108  |  70<H>  ----------------------------<  102<H>  3.8   |  27  |  1.64<H>      144  |  102  |  43<H>  ----------------------------<  119<H>  3.9   |  33<H>  |  1.04      139  |  98  |  30<H>  ----------------------------<  152<H>  3.7   |  32<H>  |  0.83    Ca    10.7<H>      2020 05:51  Ca    11.0<H>      2020 06:40  Ca    10.5      2020 04:34  Phos  5.2       Phos  3.8       Phos  3.0       Mg     2.6       Mg     2.3       Mg     2.3                 URINE STUDIES:        Osmolality, Random Urine: 742 mos/kg ( @ 12:14)  Sodium, Random Urine: 46 mmol/L ( @ 12:14)  Creatinine, Random Urine: 88 mg/dL ( 12:14)  Chloride, Random Urine: <10 mmol/L ( @ 12:14)                  RADIOLOGY & ADDITIONAL STUDIES: pt seen,  examined case discussed with renal attending    SUBJECTIVE:  pt is sedated on vent, s/p hypotension to 6os systolic last pm with high temp  pt is now having reduced U/O last 12 hrs with elevated serum cr and bun  pt is on pressor for low bp    REVIEW OF SYSTEMS:  Unable to get ros due to clinical condition    Current meds:    aspirin  chewable 81 milliGRAM(s) Oral daily  atorvastatin 40 milliGRAM(s) Oral at bedtime  chlorhexidine 0.12% Liquid 15 milliLiter(s) Oral Mucosa every 12 hours  chlorhexidine 4% Liquid 1 Application(s) Topical daily  chlorhexidine 4% Liquid 1 Application(s) Topical <User Schedule>  dexMEDEtomidine Infusion 0.7 MICROgram(s)/kG/Hr IV Continuous <Continuous>  folic acid 1 milliGRAM(s) Oral daily  insulin lispro (HumaLOG) corrective regimen sliding scale   SubCutaneous every 6 hours  multivitamin 1 Tablet(s) Oral daily  norepinephrine Infusion 0.05 MICROgram(s)/kG/Min IV Continuous <Continuous>  nystatin Powder 1 Application(s) Topical three times a day  pantoprazole   Suspension 40 milliGRAM(s) Enteral Tube daily  PHENobarbital Injectable 130 milliGRAM(s) IV Push every 6 hours PRN  piperacillin/tazobactam IVPB.. 3.375 Gram(s) IV Intermittent every 8 hours  sodium chloride 0.9% Bolus 1000 milliLiter(s) IV Bolus once  sodium chloride 0.9% lock flush 10 milliLiter(s) IV Push every 1 hour PRN  thiamine IVPB 500 milliGRAM(s) IV Intermittent daily  vancomycin  IVPB 1000 milliGRAM(s) IV Intermittent <User Schedule>  vancomycin  IVPB 1000 milliGRAM(s) IV Intermittent once      Vital Signs    T(F): 100.4 (20 @ 16:30), Max: 105.9 (20 @ 21:30)  HR: 91 (20 @ 16:30) (83 - 129)  BP: 100/55 (20 @ 16:00) (58/40 - 134/79)  ABP: 115/74 (20 @ 16:30) (112/71 - 148/88)  RR: 12 (20 @ 16:30) (11 - 42)  SpO2: 96% (20 @ 16:30) (90% - 100%)  Wt(kg): --  CVP(cm H2O): --  CO: --  PCWP: --    I and O's:     @ 07:  -   @ 07:00  --------------------------------------------------------  IN:    dexmedetomidine Infusion: 84.1 mL    dexmedetomidine Infusion: 132 mL    heparin  Infusion.: 192 mL    Lactated Ringers IV Bolus: 500 mL    ns in tub fed  [id number]: 525 mL    propofol Infusion: 405 mL    propofol Infusion: 135 mL    Solution: 300 mL  Total IN: 2273.1 mL    OUT:    Indwelling Catheter - Urethral: 970 mL  Total OUT: 970 mL    Total NET: 1303.1 mL       @ 07: @ 07:00  --------------------------------------------------------  IN:    dexmedetomidine Infusion: 781.4 mL    Enteral Tube Flush: 130 mL    Glucerna 1.5: 40 mL    norepinephrine Infusion: 6 mL    norepinephrine Infusion: 134.6 mL    propofol Infusion: 22.5 mL    Sodium Chloride 0.9% IV Bolus: 1000 mL    Solution: 100 mL    Solution: 200 mL    Solution: 250 mL    Solution: 200 mL  Total IN: 2864.5 mL    OUT:    Indwelling Catheter - Urethral: 940 mL  Total OUT: 940 mL    Total NET: 1924.5 mL       @ 07: @ 16:58  --------------------------------------------------------  IN:    dexmedetomidine Infusion: 168.6 mL    dexmedetomidine Infusion: 50 mL    dexmedetomidine Infusion: 25 mL    Glucerna 1.5: 230 mL    norepinephrine Infusion: 93.2 mL    Sodium Chloride 0.9% IV Bolus: 1000 mL    Solution: 250 mL    Solution: 100 mL  Total IN: 1916.8 mL    OUT:    Indwelling Catheter - Urethral: 580 mL  Total OUT: 580 mL    Total NET: 1336.8 mL        Daily     Daily Weight in k.7 (2020 07:00)    PHYSICAL EXAM:  Constitutional: intubated and sedated  HEENT: PERRLA,  Neck: No JVD, thyromegaly  Respiratory: reduced air entry lower lungs with no rales, wheezing or rhonchi  Cardiovascular: S1 and S2 normally heard  Gastrointestinal: soft, nondistended, nontender and normal bowel sounds heard  Extremities: trace  peripheral edema present in both LEs  Neurological: intubated and sedated  Skin: No rashes      LABS:    CBC:                          15.4   6.79  )-----------( 52       ( 2020 05:51 )             48.9           BMP:        147<H>  |  108  |  70<H>  ----------------------------<  102<H>  3.8   |  27  |  1.64<H>      144  |  102  |  43<H>  ----------------------------<  119<H>  3.9   |  33<H>  |  1.04      139  |  98  |  30<H>  ----------------------------<  152<H>  3.7   |  32<H>  |  0.83    Ca    10.7<H>      2020 05:51  Ca    11.0<H>      2020 06:40  Ca    10.5      2020 04:34  Phos  5.2       Phos  3.8       Phos  3.0       Mg     2.6       Mg     2.3       Mg     2.3                 URINE STUDIES:  Osmolality, Random Urine: 742 mos/kg ( @ 12:14)  Sodium, Random Urine: 46 mmol/L ( @ 12:14)  Creatinine, Random Urine: 88 mg/dL ( @ 12:14)  Chloride, Random Urine: <10 mmol/L (01-09 @ 12:14)                  RADIOLOGY & ADDITIONAL STUDIES:      < from: Xray Chest 1 View- PORTABLE-Urgent (20 @ 02:13) >  EXAM:  XR CHEST PORTABLE URGENT 1V                            PROCEDURE DATE:  2020          INTERPRETATION:  CLINICAL INDICATION: 69 years  Male with center line placement.    COMPARISON: 2020    AP view the chest is rotated to the right. There is right upper lobe soft tissue prominence suggestive of infiltrate, atelectasis or mass. There is mild pulmonary vascular congestion. No focal consolidations present. There is no right pleural effusion. A small left pleural effusion is reidentified. The heart is mildly enlarged    The left hilum is clear.    The tip of the ET tube is in the mid trachea. The tip of the NG tube is below the diaphragm. There is a right midline catheter with its tip overlying the expected location of the right subclavian vein. There is no pleural effusion. There is no pneumothorax.    Mild thoracic degenerative changes are present.    IMPRESSION:    Right midline catheter, ET tube and NG tube in satisfactory position.    Right upper lobe opacity suggestive of atelectasis, infiltrate or mass. Small left pleural effusion.    Cardiomegaly.    < end of copied text >

## 2020-01-09 NOTE — PROGRESS NOTE ADULT - SUBJECTIVE AND OBJECTIVE BOX
Time of Visit:  Patient seen and examined.     MEDICATIONS  (STANDING):  aspirin  chewable 81 milliGRAM(s) Oral daily  atorvastatin 40 milliGRAM(s) Oral at bedtime  chlorhexidine 0.12% Liquid 15 milliLiter(s) Oral Mucosa every 12 hours  chlorhexidine 4% Liquid 1 Application(s) Topical daily  chlorhexidine 4% Liquid 1 Application(s) Topical <User Schedule>  dexMEDEtomidine Infusion 0.7 MICROgram(s)/kG/Hr (21.875 mL/Hr) IV Continuous <Continuous>  folic acid 1 milliGRAM(s) Oral daily  insulin lispro (HumaLOG) corrective regimen sliding scale   SubCutaneous every 6 hours  metoprolol tartrate 25 milliGRAM(s) Oral two times a day  multivitamin 1 Tablet(s) Oral daily  norepinephrine Infusion 0.05 MICROgram(s)/kG/Min (5.859 mL/Hr) IV Continuous <Continuous>  nystatin Powder 1 Application(s) Topical three times a day  pantoprazole   Suspension 40 milliGRAM(s) Enteral Tube daily  piperacillin/tazobactam IVPB.. 3.375 Gram(s) IV Intermittent every 8 hours  sodium chloride 0.9% Bolus 1000 milliLiter(s) IV Bolus once  thiamine IVPB 500 milliGRAM(s) IV Intermittent daily  vancomycin  IVPB 1000 milliGRAM(s) IV Intermittent <User Schedule>  vancomycin  IVPB 1000 milliGRAM(s) IV Intermittent once      MEDICATIONS  (PRN):  PHENobarbital Injectable 130 milliGRAM(s) IV Push every 6 hours PRN agitation  sodium chloride 0.9% lock flush 10 milliLiter(s) IV Push every 1 hour PRN Pre/post blood products, medications, blood draw, and to maintain line patency       Medications up to date at time of exam.       PHYSICAL EXAMINATION:    Vital Signs Last 24 Hrs  T(C): 38.1 (2020 15:00), Max: 41.1 (2020 21:30)  T(F): 100.6 (2020 15:00), Max: 105.9 (2020 21:30)  HR: 103 (2020 16:00) (83 - 129)  BP: 100/55 (2020 16:00) (58/40 - 134/79)  BP(mean): 65 (2020 16:00) (44 - 94)  RR: 14 (2020 16:00) (11 - 42)  SpO2: 97% (2020 16:00) (90% - 100%)  Mode: CPAP with PS  FiO2: 40  PEEP: 5  PS: 10  ITime: 1   (if applicable)    General; Sedated , on vent support. No acute distress.     HEENT: Head is normocephalic and atraumatic. No nasal tenderness. + ETT , + NGT.  Mucous membranes are moist.     NECK: Supple, no palpable adenopathy.    LUNGS: Clear to auscultation, no wheezing, rales, or rhonchi. No use of accessory muscle. On vent support to AC setting.      HEART: S1 S2 Regular rate and No JVD.    ABDOMEN: Soft, nontender, and nondistended.  Active bowel sounds .    EXTREMITIES: Total care. Non ambulatory.     NEUROLOGIC: Sedated. No tremors. No seizure.      SKIN: Warm and moist. Non diaphoretic.       LABS:                        15.4   6.79  )-----------( 52       ( 2020 05:51 )             48.9     01-    147<H>  |  108  |  70<H>  ----------------------------<  102<H>  3.8   |  27  |  1.64<H>    Ca    10.7<H>      2020 05:51  Phos  5.2     01-09  Mg     2.6     01-09      PTT - ( 2020 18:25 )  PTT:>200.0 sec  Urinalysis Basic - ( 2020 12:15 )    Color: Marleny / Appearance: very cloudy / S.025 / pH: x  Gluc: x / Ketone: Trace  / Bili: Moderate / Urobili: 4   Blood: x / Protein: 100 / Nitrite: Positive   Leuk Esterase: Trace / RBC: See Note /HPF / WBC 0-2 /HPF   Sq Epi: x / Non Sq Epi: Occasional /HPF / Bacteria: Many /HPF      ABG - ( 2020 01:17 )  pH, Arterial: 7.39  pH, Blood: x     /  pCO2: 47    /  pO2: 239   / HCO3: 28    / Base Excess: 2.9   /  SaO2: 98                CARDIAC MARKERS ( 2020 06:40 )  x     / x     / 201 U/L / x     / x        RADIOLOGY & ADDITIONAL STUDIES:  EKG:   CXR: < from: Xray Chest 1 View- PORTABLE-Urgent (20 @ 02:13) >      PROCEDURE DATE:  2020          INTERPRETATION:  CLINICAL INDICATION: 69 years  Male with center line placement.    COMPARISON: 2020    AP view the chest is rotated to the right. There is right upper lobe soft tissue prominence suggestive of infiltrate, atelectasis or mass. There is mild pulmonary vascular congestion. No focal consolidations present. There is no right pleural effusion. A small left pleural effusion is reidentified. The heart is mildly enlarged    The left hilum is clear.    The tip of the ET tube is in the mid trachea. The tip of the NG tube is below the diaphragm. There is a right midline catheter with its tip overlying the expected location of the right subclavian vein. There is no pleural effusion. There is no pneumothorax.    Mild thoracic degenerative changes are present.    IMPRESSION:    Right midline catheter, ET tube and NG tube in satisfactory position.    Right upper lobe opacity suggestive of atelectasis, infiltrate or mass. Small left pleural effusion.    Cardiomegaly.      IMPRESSION: 69y Male PAST MEDICAL & SURGICAL HISTORY:  HTN (hypertension)  Atrial fibrillation  No significant past surgical history    Impression: 70 Y/O Male who appears older than stated age initially admitted to medical floor for CHF exacerbation, afib with RVR, alcohol withdrawal and sepsis 2/2 cellulitis. Patient is AAOx2-3 at baseline, noted lethargic by nurse . Patient is on CIWA protocol and received Ativan Iv push . RRT was called for respiratory distress and lethargy, patient is minimally arousable to sternal rub, not able to follow commands.  + Aspiration Pneumonia . On ICU and  intubated on  due to Acute Hypercapnic Respiratory failure.     Suggestion;  Continue vent support to setting AC 16  FIO2 40% Peep 5.   Oral hygiene care with Chlorhexidine.   Turning and repositioning.   On Levophed and Precedex IVCD drip.   Continue antibiotic as per ID recommendation.   Aspiration precautions with HOB elevation.  DVT/ GI prophylactic.  CIWA protocol. Has bilateral wrist restraint . Time of Visit:  Patient seen and examined.     MEDICATIONS  (STANDING):  aspirin  chewable 81 milliGRAM(s) Oral daily  atorvastatin 40 milliGRAM(s) Oral at bedtime  chlorhexidine 0.12% Liquid 15 milliLiter(s) Oral Mucosa every 12 hours  chlorhexidine 4% Liquid 1 Application(s) Topical daily  chlorhexidine 4% Liquid 1 Application(s) Topical <User Schedule>  dexMEDEtomidine Infusion 0.7 MICROgram(s)/kG/Hr (21.875 mL/Hr) IV Continuous <Continuous>  folic acid 1 milliGRAM(s) Oral daily  insulin lispro (HumaLOG) corrective regimen sliding scale   SubCutaneous every 6 hours  metoprolol tartrate 25 milliGRAM(s) Oral two times a day  multivitamin 1 Tablet(s) Oral daily  norepinephrine Infusion 0.05 MICROgram(s)/kG/Min (5.859 mL/Hr) IV Continuous <Continuous>  nystatin Powder 1 Application(s) Topical three times a day  pantoprazole   Suspension 40 milliGRAM(s) Enteral Tube daily  piperacillin/tazobactam IVPB.. 3.375 Gram(s) IV Intermittent every 8 hours  sodium chloride 0.9% Bolus 1000 milliLiter(s) IV Bolus once  thiamine IVPB 500 milliGRAM(s) IV Intermittent daily  vancomycin  IVPB 1000 milliGRAM(s) IV Intermittent <User Schedule>  vancomycin  IVPB 1000 milliGRAM(s) IV Intermittent once      MEDICATIONS  (PRN):  PHENobarbital Injectable 130 milliGRAM(s) IV Push every 6 hours PRN agitation  sodium chloride 0.9% lock flush 10 milliLiter(s) IV Push every 1 hour PRN Pre/post blood products, medications, blood draw, and to maintain line patency       Medications up to date at time of exam.       PHYSICAL EXAMINATION:    Vital Signs Last 24 Hrs  T(C): 38.1 (2020 15:00), Max: 41.1 (2020 21:30)  T(F): 100.6 (2020 15:00), Max: 105.9 (2020 21:30)  HR: 103 (2020 16:00) (83 - 129)  BP: 100/55 (2020 16:00) (58/40 - 134/79)  BP(mean): 65 (2020 16:00) (44 - 94)  RR: 14 (2020 16:00) (11 - 42)  SpO2: 97% (2020 16:00) (90% - 100%)  Mode: CPAP with PS  FiO2: 40  PEEP: 5  PS: 10  ITime: 1   (if applicable)    General; Sedated , on vent support. No acute distress.     HEENT: Head is normocephalic and atraumatic. No nasal tenderness. + ETT , + NGT.  Mucous membranes are moist.     NECK: Supple, no palpable adenopathy.    LUNGS: Clear to auscultation, no wheezing, rales, or rhonchi. No use of accessory muscle. On vent support to AC setting.      HEART: S1 S2 Regular rate and No JVD.    ABDOMEN: Soft, nontender, and nondistended.  Active bowel sounds .    EXTREMITIES: Total care. Non ambulatory.     NEUROLOGIC: Sedated. No tremors. No seizure.      SKIN: Warm and moist. Non diaphoretic.       LABS:                        15.4   6.79  )-----------( 52       ( 2020 05:51 )             48.9     01-    147<H>  |  108  |  70<H>  ----------------------------<  102<H>  3.8   |  27  |  1.64<H>    Ca    10.7<H>      2020 05:51  Phos  5.2     01-09  Mg     2.6     01-09      PTT - ( 2020 18:25 )  PTT:>200.0 sec  Urinalysis Basic - ( 2020 12:15 )    Color: Marleny / Appearance: very cloudy / S.025 / pH: x  Gluc: x / Ketone: Trace  / Bili: Moderate / Urobili: 4   Blood: x / Protein: 100 / Nitrite: Positive   Leuk Esterase: Trace / RBC: See Note /HPF / WBC 0-2 /HPF   Sq Epi: x / Non Sq Epi: Occasional /HPF / Bacteria: Many /HPF      ABG - ( 2020 01:17 )  pH, Arterial: 7.39  pH, Blood: x     /  pCO2: 47    /  pO2: 239   / HCO3: 28    / Base Excess: 2.9   /  SaO2: 98                CARDIAC MARKERS ( 2020 06:40 )  x     / x     / 201 U/L / x     / x        RADIOLOGY & ADDITIONAL STUDIES:  EKG:   CXR: < from: Xray Chest 1 View- PORTABLE-Urgent (20 @ 02:13) >      PROCEDURE DATE:  2020          INTERPRETATION:  CLINICAL INDICATION: 69 years  Male with center line placement.    COMPARISON: 2020    AP view the chest is rotated to the right. There is right upper lobe soft tissue prominence suggestive of infiltrate, atelectasis or mass. There is mild pulmonary vascular congestion. No focal consolidations present. There is no right pleural effusion. A small left pleural effusion is reidentified. The heart is mildly enlarged    The left hilum is clear.    The tip of the ET tube is in the mid trachea. The tip of the NG tube is below the diaphragm. There is a right midline catheter with its tip overlying the expected location of the right subclavian vein. There is no pleural effusion. There is no pneumothorax.    Mild thoracic degenerative changes are present.    IMPRESSION:    Right midline catheter, ET tube and NG tube in satisfactory position.    Right upper lobe opacity suggestive of atelectasis, infiltrate or mass. Small left pleural effusion.    Cardiomegaly.      IMPRESSION: 69y Male PAST MEDICAL & SURGICAL HISTORY:  HTN (hypertension)  Atrial fibrillation  No significant past surgical history    Impression: 70 Y/O Male who appears older than stated age initially admitted to medical floor for CHF exacerbation, afib with RVR, alcohol withdrawal and sepsis 2/2 cellulitis. Patient is AAOx2-3 at baseline, noted lethargic by nurse . Patient is on CIWA protocol and received Ativan Iv push . RRT was called for respiratory distress and lethargy, patient is minimally arousable to sternal rub, not able to follow commands.  + Aspiration Pneumonia . On ICU and  intubated on  due to Acute Hypercapnic Respiratory failure.     Suggestion;  Continue vent support to setting AC 16  FIO2 40% Peep 5.   Oral hygiene care with Chlorhexidine.   Turning and repositioning.   On Levophed and Precedex IVCD drip.   Continue antibiotic as per ID recommendation.   Aspiration precautions with HOB elevation.  DVT/ GI prophylactic.  CIWA protocol. Has bilateral wrist restraint .       Agree with above assessment and plan as transcribed.

## 2020-01-09 NOTE — ADVANCED PRACTICE NURSE CONSULT - RECOMMEDATIONS
-Clean all wounds with normal saline and apply skin prep to the surrounding skin  -Please consult Palliative Care for further evaluation  -Apply TRIAD Moisture Barrier Cream to the Bilateral Gluteus and Scrotal area b.i.d. PRN  -Protect the Bilateral Heels using an ABD pad, prior to application of heel protectors  -Elevate/float the patients heels using heel protectors and reposition the patient Q 2hrs using wedges or pillows -Clean all wounds with normal saline and apply skin prep to the surrounding skin  -Please consult Palliative Care for further evaluation  -Apply TRIAD Moisture Barrier Cream to the Bilateral Gluteus, Perianal, and Scrotal area b.i.d. PRN  -Protect the Bilateral Heels using an ABD pad, prior to application of heel protectors  -Elevate/float the patients heels using heel protectors and reposition the patient Q 2hrs using wedges or pillows

## 2020-01-10 NOTE — PROGRESS NOTE ADULT - ATTENDING COMMENTS
Patient seen and examined with resident, Addendum to above.    69 yr old man with afib (on Pradaxa), HTN and alcohol abuse presents to the ED with chief complaint of weakness and shortness of breath. Patient is initially admitted to medical floor for CHF exacerbation, afib with RVR, alcohol withdrawal.  RRT was called for respiratory distress and lethargy, patient was minimally arousable to sternal rub, not able to follow commands.  + ASP PNA on antibx. . intubated 1/5.     Chest xray improved this morning. Overnight with worsening shock and hypoxia.     Assessment;  1. Acute respiratory failure  2. Alcohol withdrawal syndrome   3. Atrial fibrillation   4. Heart failure with preserved EF   5. Thrombocytopenia   6. Cirrhosis   7. REI     - Cont. mechanical ventilation, titrate down Fio2 as tolerated  - Daily awakening and weaning trials   - Monitor for signs of withdrawal  - Cont. Precedex and Phenobarbital prn per CIWA  - Thiamine and Folate supplementation   - Cont. tube feeds   - Cont. antibiotics, f/u blood and sputum cultures. UA negative as such unlikely UTI.   - Monitor for signs of bleeding, holding heparin given thrombocytopenia   - Monitor urine output  - Start lactulose given elevated ammonia level and constipation  - Work up for HUS/TTP though unlikely   - Overall prognosis is guarded at this time  - Palliative care consult   - DVT prophylaxis with SCD, and stress ulcer prophylaxis

## 2020-01-10 NOTE — PROGRESS NOTE ADULT - SUBJECTIVE AND OBJECTIVE BOX
CC: Patient is unresponsive to tactile stimuli, on ventilator and pressor, NAD    Vital Signs Last 24 Hrs  T(C): 38.6 (10 Luiz 2020 13:30), Max: 38.9 (10 Luiz 2020 02:45)  T(F): 101.4 (10 Luiz 2020 13:30), Max: 102.1 (10 Luiz 2020 02:45)  HR: 120 (10 Luiz 2020 14:15) (85 - 136)  BP: 119/90 (10 Luiz 2020 13:00) (56/40 - 191/129)  BP(mean): 93 (10 Luiz 2020 13:00) (44 - 145)  RR: 15 (10 Luiz 2020 14:15) (12 - 27)  SpO2: 100% (10 Luiz 2020 14:15) (78% - 100%)     @ 07:10 @ 07:00  --------------------------------------------------------  IN: 2584 mL / OUT: 1135 mL / NET: 1449 mL    01-10 @ 07:01  10 @ 14:33  --------------------------------------------------------  IN: 108.2 mL / OUT: 220 mL / NET: -111.8 mL    PHYSICAL EXAM:  Constitutional: well developed and nourished  HEENT: Sclera anicteric  Neck: No JVD  Respiratory: reduced air entry lower lungs with no rales, wheezing or rhonchi  Cardiovascular: S1 and S2 normally heard  Gastrointestinal: soft, nondistended, nontender and normal bowel sounds heard  Extremities: trace  peripheral edema present in both LEs  Neurological: sedated  Skin: Normal turgor    MEDICATIONS:  aspirin  chewable 81 milliGRAM(s) Oral daily  atorvastatin 40 milliGRAM(s) Oral at bedtime  chlorhexidine 0.12% Liquid 15 milliLiter(s) Oral Mucosa every 12 hours  chlorhexidine 4% Liquid 1 Application(s) Topical daily  chlorhexidine 4% Liquid 1 Application(s) Topical <User Schedule>  dexMEDEtomidine Infusion 0.7 MICROgram(s)/kG/Hr IV Continuous <Continuous>  folic acid 1 milliGRAM(s) Oral daily  heparin  Injectable 5000 Unit(s) SubCutaneous every 12 hours  insulin lispro (HumaLOG) corrective regimen sliding scale   SubCutaneous every 6 hours  lactulose Syrup 10 Gram(s) Oral two times a day  multivitamin 1 Tablet(s) Oral daily  norepinephrine Infusion 0.13 MICROgram(s)/kG/Min IV Continuous <Continuous>  nystatin Powder 1 Application(s) Topical three times a day  pantoprazole   Suspension 40 milliGRAM(s) Enteral Tube daily  PHENobarbital Injectable 130 milliGRAM(s) IV Push every 6 hours PRN  piperacillin/tazobactam IVPB.. 3.375 Gram(s) IV Intermittent every 8 hours  sodium chloride 0.9% lock flush 10 milliLiter(s) IV Push every 1 hour PRN  thiamine IVPB 500 milliGRAM(s) IV Intermittent daily  vancomycin  IVPB 1000 milliGRAM(s) IV Intermittent <User Schedule>  vancomycin  IVPB 1000 milliGRAM(s) IV Intermittent once      LABS:                        15.0   4.90  )-----------( 40       ( 10 Luiz 2020 09:27 )             47.1     01-10    145  |  107  |  65<H>  ----------------------------<  173<H>  4.4   |  28  |  1.30    Ca    10.6<H>      10 Luiz 2020 03:26  Phos  3.4     01-10  Mg     2.4     01-10      PT/INR - ( 10 Luiz 2020 09:27 )   PT: 14.3 sec;   INR: 1.28 ratio     PTT - ( 10 Luiz 2020 09:27 )  PTT:47.4 sec  Urinalysis Basic - ( 2020 12:15 )    Color: Marleny / Appearance: very cloudy / S.025 / pH: x  Gluc: x / Ketone: Trace  / Bili: Moderate / Urobili: 4   Blood: x / Protein: 100 / Nitrite: Positive   Leuk Esterase: Trace / RBC: See Note /HPF / WBC 0-2 /HPF   Sq Epi: x / Non Sq Epi: Occasional /HPF / Bacteria: Many /HPF    ASSESSMENT AND PLAN:     ALFREDO: pre -renal azotemia improving  -suggest to keep sbp>110 to avoid further renal injury   -Keep patient euvolemic and renal diet  -Avoid Nephrotoxic Meds/ Agents such as (NSAIDs, IV contrast, Aminoglycosides such as gentamicin, -Gadolinium contrast, Phosphate containing enemas, etc..)  -Adjust Medications according to eGFR  -f/u bmp daily    HYPERCALCEMIA: improving   -suggest to get Vit D2/D3 level  ,ace level and Pth-related  protein  -f/u ca level daily  -suggest endocrine consult    EDEMA:multifactorial like chf/liver ds  -avoid iv fluid    HYPERPHOSPHATEMIA:mild ,secondary to alfredo resolved  -f/u phos level

## 2020-01-10 NOTE — PROGRESS NOTE ADULT - ASSESSMENT
70 y/o male with PMHx of afib (on Pradaxa), HTN and alcohol abuse presents to the ED with chief complaint of weakness and shortness of breath. Patient is initially admitted to medical floor for CHF exacerbation, afib with RVR, alcohol withdrawal and sepsis 2/2 cellulitis. Patient is AAOx2-3 at baseline, noted lethargic by nurse this morning. Patient is on CIWA protocol and received total 8 mg of ativan Iv push in last 24 hours. RRT was called for respiratory distress and lethargy, patient is minimally arousable to sternal rub, not able to follow commands.     Plan:     Neuro:   AAOx2-3 at baseline   Agitation getting better  Noted to be Altered mental status on admission likely from hypercarbia and medication induced (on ativan as per [facility])  pt on ativan and phenobarbital PRN  Intubated in ICU on 1/5     Respi:   Acute hypercapnic respiratory failure:   likely from combination of CHF and medication induced form ativan   FIO2 increased to 50% last night, was changed to 40%.   ABG - 1/9 - PH 7.36  Started on ETT on 1/5.     CVS:   # Afib:   history of Afib   rate controlled with metoprolol, on pradexa for anticoagulation   will hold pradexa for now   held Heparin drip from Thrombocytopenia.    CHF:  admitted with CHF exacerbation   d/c lasix   stict I/O and daily weight   echo- EF - 55%    GI:   Tube fed  NG tube in place   Pt noted to be constipated, added on lactulose.  # Transaminitis:    /  on admission -stable   tbili 4.3 on admission  etiology unclear at this time  hepatitis panel - negative   CT abdomen shows now CBD dilatation, but does show hypodense lesions  USG abd- Contracted bladder  GI consulted - Dr. Martel.    Heme:  # Thrombocytopenia.    Platelets on admission 59 -> 55 -> 44->48->52->46  Blood smear 1/10- WNL.   slight elevation in D dimer 2200  f/u frey 13    Endo:   #DM:   HbA1c of 6.5  sliding scale q6 while NPO     ID:  # cellulitis:   Fever- 101F today   started on Vanco, zosyn  f/u VT, BLD CX  Sputum cx- GP cocci in pairs       Renal:   Creatine levels improved after IV bolus. REI resolving    urine lytes- WNL  Hypercalcemia+ f/u vit D, PTHrp    PPx:   was on pradexa - holding   protonix IV for GI  prophylaxis

## 2020-01-10 NOTE — CHART NOTE - NSCHARTNOTEFT_GEN_A_CORE
Assessment:   Patient is a 69y old  Male who presents with a chief complaint of Liver  issues (10 Luiz 2020 16:12). MOSF .Continues intubated. Pt seen earlier. RN reports just having turned off feeding (?difficulty breathing). Reports no diarrhea, started on Lactulose (pt noted as constipated). Not on Propofol      Factors impacting intake: [ ] none [ ] nausea  [ ] vomiting [ ] diarrhea [ ] constipation  [ ]chewing problems [ ] swallowing issues  [ ] other:     Diet Prescription: Diet, NPO with Tube Feed:   Tube Feeding Modality: Nasogastric  Glucerna 1.5 Hu  Total Volume for 24 Hours (mL): 840  Continuous  Starting Tube Feed Rate {mL per Hour}: 10  Increase Tube Feed Rate by (mL): 10     Every 6 hours  Until Goal Tube Feed Rate (mL per Hour): 35  Tube Feed Duration (in Hours): 24  Tube Feed Start Time: 07:00 (20 @ 16:40)    Intake:     Daily Height in cm: 180.34 (2020 15:39)      Daily Weight in k.7 (10 Luiz 2020 07:00)  Weight in k.7 (2020 07:00)  Weight in k.8 (2020 07:15)  Weight in k.4 (2020 07:00)  Weight in k (2020 14:20)  Weight in k.4 (2020 07:00)    % Weight Change    Pertinent Medications: MEDICATIONS  (STANDING):  aspirin  chewable 81 milliGRAM(s) Oral daily  atorvastatin 40 milliGRAM(s) Oral at bedtime  chlorhexidine 0.12% Liquid 15 milliLiter(s) Oral Mucosa every 12 hours  chlorhexidine 4% Liquid 1 Application(s) Topical daily  chlorhexidine 4% Liquid 1 Application(s) Topical <User Schedule>  dexMEDEtomidine Infusion 0.7 MICROgram(s)/kG/Hr (21.875 mL/Hr) IV Continuous <Continuous>  folic acid 1 milliGRAM(s) Oral daily  heparin  Injectable 5000 Unit(s) SubCutaneous every 12 hours  insulin lispro (HumaLOG) corrective regimen sliding scale   SubCutaneous every 6 hours  lactulose Syrup 10 Gram(s) Oral two times a day  multivitamin 1 Tablet(s) Oral daily  norepinephrine Infusion 0.13 MICROgram(s)/kG/Min (15.234 mL/Hr) IV Continuous <Continuous>  nystatin Powder 1 Application(s) Topical three times a day  pantoprazole   Suspension 40 milliGRAM(s) Enteral Tube daily  piperacillin/tazobactam IVPB.. 3.375 Gram(s) IV Intermittent every 8 hours  thiamine IVPB 500 milliGRAM(s) IV Intermittent daily  vancomycin  IVPB 1000 milliGRAM(s) IV Intermittent <User Schedule>  vancomycin  IVPB 1000 milliGRAM(s) IV Intermittent once    MEDICATIONS  (PRN):  PHENobarbital Injectable 130 milliGRAM(s) IV Push every 6 hours PRN agitation  sodium chloride 0.9% lock flush 10 milliLiter(s) IV Push every 1 hour PRN Pre/post blood products, medications, blood draw, and to maintain line patency    Pertinent Labs: 01-10 Na145 mmol/L Glu 173 mg/dL<H> K+ 4.4 mmol/L Cr  1.30 mg/dL BUN 65 mg/dL<H> 01-10 Phos 3.4 mg/dL  Alb 2.2 g/dL<L>  [id number] 6.5 %<H>  Chol 208 mg/dL<H>  mg/dL HDL 60 mg/dL Trig 131 mg/dL     CAPILLARY BLOOD GLUCOSE      POCT Blood Glucose.: 147 mg/dL (10 Luiz 2020 16:23)  POCT Blood Glucose.: 169 mg/dL (10 Luiz 2020 10:44)  POCT Blood Glucose.: 169 mg/dL (10 Luiz 2020 05:34)  POCT Blood Glucose.: 166 mg/dL (10 Luiz 2020 02:44)  POCT Blood Glucose.: 145 mg/dL (2020 17:02)    Skin:     Estimated Needs:   [ ] no change since previous assessment  [ ] recalculated:       Previous Nutrition Diagnosis:   [ ] Altered GI function  [ ]Inadequate Oral Intake [x ] Swallowing Difficulty   [ ] Altered nutrition related labs [ ] Increased Nutrient Needs [ ] Overweight/Obesity   [ ] Unintended Weight Loss [ ] Food & Nutrition Related Knowledge Deficit [ ] Malnutrition   [ ] Other:     Nutrition Diagnosis is [x ] ongoing  [ ] resolved [ ] not applicable     New Nutrition Diagnosis: [ ] not applicable       Interventions:   Recommend  [ ] Change Diet To:  [ ] Nutrition Supplement  [x ] Nutrition Support:  Consider Glucerna 1.5 45ml/hr x 24 hrs  ( goal rate) Glucerna 1.5 1080 ml, 1620 kcals, 89 gm protein, 821 ml free water. MD to monitor. RD available.   [ ] Other:     Monitoring and Evaluation:    [ x ] Tolerance to diet prescription [ x ] weights [ x ] labs[ x ] follow up per protocol  [ ] other:

## 2020-01-10 NOTE — PROGRESS NOTE ADULT - SUBJECTIVE AND OBJECTIVE BOX
GI PROGRESS NOTE    Patient is a 69y old  Male who presents with a chief complaint of Acute hypercapnic respiratory failure. (10 Luiz 2020 11:48)      HPI:  68 y/o male with PMHx of afib (on Pradaxa), HTN and alcohol abuse presents to the ED with chief complaint of weakness and shortness of breath. Patient is AAOx2 and a vague historian. He reports having multiple falls in the past 2 weeks. He thinks he has experienced LOC sometimes as well. He endorses left lower chest pain, described as worse with movement and pleuritic in nature. He reports lower extremitiy edema over the past few months. He also complains of left lower abdominal pain. He denies nausea or vomiting. He reports currently drinking 1 bottle of wine daily with a shot of scotch. He lives alone and says he ambulates independently. patient remains on vent, unable to wean . No bleeding apparant     ED course:  EKG shows afib with RVR  RLE noted to be cool to touch compared to left - bedside doppler confirmed pulses of both LE. (04 Jan 2020 00:23)          ______________________________________________________________________  PMH/PSH:  PAST MEDICAL & SURGICAL HISTORY:  HTN (hypertension)  Atrial fibrillation  No significant past surgical history    ______________________________________________________________________  MEDS:  MEDICATIONS  (STANDING):  aspirin  chewable 81 milliGRAM(s) Oral daily  atorvastatin 40 milliGRAM(s) Oral at bedtime  chlorhexidine 0.12% Liquid 15 milliLiter(s) Oral Mucosa every 12 hours  chlorhexidine 4% Liquid 1 Application(s) Topical daily  chlorhexidine 4% Liquid 1 Application(s) Topical <User Schedule>  dexMEDEtomidine Infusion 0.7 MICROgram(s)/kG/Hr (21.875 mL/Hr) IV Continuous <Continuous>  folic acid 1 milliGRAM(s) Oral daily  heparin  Injectable 5000 Unit(s) SubCutaneous every 12 hours  insulin lispro (HumaLOG) corrective regimen sliding scale   SubCutaneous every 6 hours  lactulose Syrup 10 Gram(s) Oral two times a day  multivitamin 1 Tablet(s) Oral daily  norepinephrine Infusion 0.13 MICROgram(s)/kG/Min (15.234 mL/Hr) IV Continuous <Continuous>  nystatin Powder 1 Application(s) Topical three times a day  pantoprazole   Suspension 40 milliGRAM(s) Enteral Tube daily  piperacillin/tazobactam IVPB.. 3.375 Gram(s) IV Intermittent every 8 hours  thiamine IVPB 500 milliGRAM(s) IV Intermittent daily  vancomycin  IVPB 1000 milliGRAM(s) IV Intermittent <User Schedule>  vancomycin  IVPB 1000 milliGRAM(s) IV Intermittent once    MEDICATIONS  (PRN):  PHENobarbital Injectable 130 milliGRAM(s) IV Push every 6 hours PRN agitation  sodium chloride 0.9% lock flush 10 milliLiter(s) IV Push every 1 hour PRN Pre/post blood products, medications, blood draw, and to maintain line patency    ______________________________________________________________________  ALL:   Allergies    No Known Allergies    Intolerances      ______________________________________________________________________  SH: Social History:  Former smoker - 1ppd for 40 years; quit at age 57; drinks heavily - 1 bottle of wine daily; lives alone (04 Jan 2020 00:23)    ______________________________________________________________________  FH:  FAMILY HISTORY:  No pertinent family history in first degree relatives    ______________________________________________________________________  ROS:    CONSTITUTIONAL:  No weight loss, fever, chills, weakness or fatigue.    HEENT:  Eyes:  No visual loss, blurred vision, double vision or yellow sclerae. Ears, Nose, Throat:  No hearing loss, sneezing, congestion, runny nose or sore throat.    SKIN:  No rash or itching.    CARDIOVASCULAR:  No chest pain, chest pressure or chest discomfort. No palpitations or edema.    RESPIRATORY:  No shortness of breath, cough or sputum.    GASTROINTESTINAL:  SEE HPI    GENITOURINARY:  No dysuria, hematuria, urinary frequency    NEUROLOGICAL:  No headache, dizziness, syncope, paralysis, ataxia, numbness or tingling in the extremities. No change in bowel or bladder control.    MUSCULOSKELETAL:  No muscle, back pain, joint pain or stiffness.    HEMATOLOGIC:  No anemia, bleeding or bruising.    LYMPHATICS:  No enlarged nodes. No history of splenectomy.    PSYCHIATRIC:  No history of depression or anxiety.    ENDOCRINOLOGIC:  No reports of sweating, cold or heat intolerance. No polyuria or polydipsia.    ALLERGIES:  No history of asthma, hives, eczema or rhinitis.  ______________________________________________________________________  PHYSICAL EXAM:  T(C): 38.6 (01-10-20 @ 14:30), Max: 38.9 (01-10-20 @ 02:45)  HR: 94 (01-10-20 @ 15:45)  BP: 119/90 (01-10-20 @ 13:00)  RR: 14 (01-10-20 @ 15:45)  SpO2: 100% (01-10-20 @ 15:45)  Wt(kg): --    01-09  -  01-10  --------------------------------------------------------  IN:    dexmedetomidine Infusion: 125 mL    dexmedetomidine Infusion: 168.6 mL    dexmedetomidine Infusion: 50 mL    Enteral Tube Flush: 100 mL    Glucerna 1.5: 325 mL    norepinephrine Infusion: 116.2 mL    norepinephrine Infusion: 234 mL    norepinephrine Infusion: 15.2 mL    Sodium Chloride 0.9% IV Bolus: 1000 mL    Solution: 100 mL    Solution: 250 mL    Solution: 100 mL  Total IN: 2584 mL    OUT:    Indwelling Catheter - Urethral: 1135 mL  Total OUT: 1135 mL    Total NET: 1449 mL      01-10  -  01-10  --------------------------------------------------------  IN:    dexmedetomidine Infusion: 156.6 mL    Glucerna 1.5: 210 mL    norepinephrine Infusion: 121.6 mL    Solution: 100 mL  Total IN: 588.2 mL    OUT:    Indwelling Catheter - Urethral: 530 mL  Total OUT: 530 mL    Total NET: 58.2 mL          GEN: NAD   CVS- S1 S2  ABD: soft nontender, non distended, bowel sounds+  LUNGS: clear to auscultation  NEURO: noin focal neuro exam; AAO x 3  Extremities: no cyanosis, no calf tenderness, no edema, no clubbing      ______________________________________________________________________  LABS:                        15.0   4.90  )-----------( 40       ( 10 Luiz 2020 09:27 )             47.1     01-10    145  |  107  |  65<H>  ----------------------------<  173<H>  4.4   |  28  |  1.30    Ca    10.6<H>      10 Luiz 2020 03:26  Phos  3.4     01-10  Mg     2.4     01-10        ______________________________________________________________________  IMAGING:    ______________________________________________________________________  ASSESSMENT:  69y Male    PLAN:

## 2020-01-10 NOTE — PROGRESS NOTE ADULT - SUBJECTIVE AND OBJECTIVE BOX
INTERVAL HPI/OVERNIGHT EVENTS: Pt still Hyperthermic. Sputum Cx- Rare GP cocci in pairs. FIO2 decreased from 50% to 40%.  Platelet count dropped to 46. Pt appeared constipated    PRESSORS: [ x] YES [ ] NO  WHICH: NE     ANTIBIOTICS:                  DATE STARTED:  ANTIBIOTICS:                  DATE STARTED:  ANTIBIOTICS:                  DATE STARTED:    Antimicrobial:  piperacillin/tazobactam IVPB.. 3.375 Gram(s) IV Intermittent every 8 hours  vancomycin  IVPB 1000 milliGRAM(s) IV Intermittent <User Schedule>  vancomycin  IVPB 1000 milliGRAM(s) IV Intermittent once    Cardiovascular:  norepinephrine Infusion 0.13 MICROgram(s)/kG/Min IV Continuous <Continuous>    Pulmonary:    Hematalogic:  aspirin  chewable 81 milliGRAM(s) Oral daily  heparin  Injectable 5000 Unit(s) SubCutaneous every 12 hours    Other:  atorvastatin 40 milliGRAM(s) Oral at bedtime  chlorhexidine 0.12% Liquid 15 milliLiter(s) Oral Mucosa every 12 hours  chlorhexidine 4% Liquid 1 Application(s) Topical daily  chlorhexidine 4% Liquid 1 Application(s) Topical <User Schedule>  dexMEDEtomidine Infusion 0.7 MICROgram(s)/kG/Hr IV Continuous <Continuous>  folic acid 1 milliGRAM(s) Oral daily  insulin lispro (HumaLOG) corrective regimen sliding scale   SubCutaneous every 6 hours  lactulose Syrup 10 Gram(s) Oral two times a day  multivitamin 1 Tablet(s) Oral daily  nystatin Powder 1 Application(s) Topical three times a day  pantoprazole   Suspension 40 milliGRAM(s) Enteral Tube daily  PHENobarbital Injectable 130 milliGRAM(s) IV Push every 6 hours PRN  sodium chloride 0.9% lock flush 10 milliLiter(s) IV Push every 1 hour PRN  thiamine IVPB 500 milliGRAM(s) IV Intermittent daily    aspirin  chewable 81 milliGRAM(s) Oral daily  atorvastatin 40 milliGRAM(s) Oral at bedtime  chlorhexidine 0.12% Liquid 15 milliLiter(s) Oral Mucosa every 12 hours  chlorhexidine 4% Liquid 1 Application(s) Topical daily  chlorhexidine 4% Liquid 1 Application(s) Topical <User Schedule>  dexMEDEtomidine Infusion 0.7 MICROgram(s)/kG/Hr IV Continuous <Continuous>  folic acid 1 milliGRAM(s) Oral daily  heparin  Injectable 5000 Unit(s) SubCutaneous every 12 hours  insulin lispro (HumaLOG) corrective regimen sliding scale   SubCutaneous every 6 hours  lactulose Syrup 10 Gram(s) Oral two times a day  multivitamin 1 Tablet(s) Oral daily  norepinephrine Infusion 0.13 MICROgram(s)/kG/Min IV Continuous <Continuous>  nystatin Powder 1 Application(s) Topical three times a day  pantoprazole   Suspension 40 milliGRAM(s) Enteral Tube daily  PHENobarbital Injectable 130 milliGRAM(s) IV Push every 6 hours PRN  piperacillin/tazobactam IVPB.. 3.375 Gram(s) IV Intermittent every 8 hours  sodium chloride 0.9% lock flush 10 milliLiter(s) IV Push every 1 hour PRN  thiamine IVPB 500 milliGRAM(s) IV Intermittent daily  vancomycin  IVPB 1000 milliGRAM(s) IV Intermittent <User Schedule>  vancomycin  IVPB 1000 milliGRAM(s) IV Intermittent once    Drug Dosing Weight  Height (cm): 180.34 (2020 15:39)  Weight (kg): 125 (2020 11:30)  BMI (kg/m2): 38.4 (2020 11:30)  BSA (m2): 2.42 (2020 11:30)    CENTRAL LINE: [ x YES [ ] NO  LOCATION:         VILLATORO: [x] YES [ ] NO          A-LINE:  [ x YES [ ] NO  LOCATION:             ICU Vital Signs Last 24 Hrs  T(C): 37.7 (10 Luiz 2020 10:30), Max: 38.9 (10 Luiz 2020 02:45)  T(F): 99.9 (10 Luiz 2020 10:30), Max: 102.1 (10 Luiz 2020 02:45)  HR: 109 (10 Luiz 2020 12:15) (85 - 136)  BP: 121/84 (10 Luiz 2020 12:00) (56/40 - 191/129)  BP(mean): 94 (10 Luiz 2020 12:00) (44 - 145)  ABP: 131/83 (10 Luiz 2020 12:15) (51/31 - 202/124)  ABP(mean): 101 (10 Luiz 2020 12:15) (0 - 160)  RR: 17 (10 Luiz 2020 12:15) (12 - 27)  SpO2: 100% (10 Luiz 2020 12:15) (78% - 100%)      ABG - ( 2020 20:10 )  pH, Arterial: 7.36  pH, Blood: x     /  pCO2: 52    /  pO2: 53    / HCO3: 28    / Base Excess: 2.3   /  SaO2: 84                     @ 07:  -  01-10 @ 07:00  --------------------------------------------------------  IN: 2584 mL / OUT: 1135 mL / NET: 1449 mL        Mode: AC/ CMV (Assist Control/ Continuous Mandatory Ventilation)  RR (machine): 14  TV (machine): 500  FiO2: 50  PEEP: 5  ITime: 1  MAP: 11  PIP: 30      REVIEW OF SYSTEMS:    could not be assesed as the pt is sedated and on Intubation    PHYSICAL EXAM:    GENERAL: Obese, sedated for Intubation, hyperthermic  NECK: RSC and A line plaved  RVOUS SYSTEM:  Sedated for intubation. decreased agitation  CHEST/LUNG: No rales, rhonchi, wheezing   HEART: Regular rate and rhythm; No murmurs,   ABDOMEN:  firm most likely from constipation.  EXTREMITIES:  2+ Peripheral Pulses, No clubbing, cyanosis, or edema        LABS:  CBC Full  -  ( 10 Luiz 2020 09:27 )  WBC Count : 4.90 K/uL  RBC Count : 4.60 M/uL  Hemoglobin : 15.0 g/dL  Hematocrit : 47.1 %  Platelet Count - Automated : 40 K/uL  Mean Cell Volume : 102.4 fl  Mean Cell Hemoglobin : 32.6 pg  Mean Cell Hemoglobin Concentration : 31.8 gm/dL  Auto Neutrophil # : 2.50 K/uL  Auto Lymphocyte # : 1.72 K/uL  Auto Monocyte # : 0.29 K/uL  Auto Eosinophil # : 0.00 K/uL  Auto Basophil # : 0.00 K/uL  Auto Neutrophil % : 40.0 %  Auto Lymphocyte % : 35.0 %  Auto Monocyte % : 6.0 %  Auto Eosinophil % : 0.0 %  Auto Basophil % : 0.0 %    01-10    145  |  107  |  65<H>  ----------------------------<  173<H>  4.4   |  28  |  1.30    Ca    10.6<H>      10 2020 03:26  Phos  3.4     -10  Mg     2.4     01-10      PT/INR - ( 10 Luiz 2020 09:27 )   PT: 14.3 sec;   INR: 1.28 ratio         PTT - ( 10 Luiz 2020 09:27 )  PTT:47.4 sec  Urinalysis Basic - ( 2020 12:15 )    Color: Marleny / Appearance: very cloudy / S.025 / pH: x  Gluc: x / Ketone: Trace  / Bili: Moderate / Urobili: 4   Blood: x / Protein: 100 / Nitrite: Positive   Leuk Esterase: Trace / RBC: See Note /HPF / WBC 0-2 /HPF   Sq Epi: x / Non Sq Epi: Occasional /HPF / Bacteria: Many /HPF      Culture Results:   No growth to date. ( @ 10:08)  Culture Results:   No growth to date. ( @ 10:08)      RADIOLOGY & ADDITIONAL STUDIES REVIEWED:  < from: Xray Chest 1 View-PORTABLE IMMEDIATE (01.10.20 @ 10:09) >    IMPRESSION:  Partial clearing, right upper lobe.    Thank you for the courtesy of this referral.    < end of copied text >      [ ]GOALS OF CARE DISCUSSION WITH PATIENT/FAMILY/PROXY:

## 2020-01-10 NOTE — CHART NOTE - NSCHARTNOTEFT_GEN_A_CORE
PC Sw went to the bedside in an attempt to meet with patient's family.  Family/friends were not present and patient was unable to participate in a conversation.  PC Sw will remain available as needed to collaborate with Medicine  and .

## 2020-01-11 NOTE — PROGRESS NOTE ADULT - SUBJECTIVE AND OBJECTIVE BOX
INTERVAL HPI/OVERNIGHT EVENTS:   Patient seen and examined at the bedside.       PRESSORS: [x ] YES [ ] NO  WHICH:NE      Antimicrobial:  piperacillin/tazobactam IVPB.. 3.375 Gram(s) IV Intermittent every 8 hours  vancomycin  IVPB 1000 milliGRAM(s) IV Intermittent <User Schedule>  vancomycin  IVPB 1000 milliGRAM(s) IV Intermittent once    Cardiovascular:  norepinephrine Infusion 0.13 MICROgram(s)/kG/Min IV Continuous <Continuous>    Pulmonary:    Hematalogic:  aspirin  chewable 81 milliGRAM(s) Oral daily  heparin  Injectable 5000 Unit(s) SubCutaneous every 12 hours    Other:  atorvastatin 40 milliGRAM(s) Oral at bedtime  chlorhexidine 0.12% Liquid 15 milliLiter(s) Oral Mucosa every 12 hours  chlorhexidine 4% Liquid 1 Application(s) Topical daily  chlorhexidine 4% Liquid 1 Application(s) Topical <User Schedule>  dexMEDEtomidine Infusion 0.8 MICROgram(s)/kG/Hr IV Continuous <Continuous>  folic acid 1 milliGRAM(s) Oral daily  insulin lispro (HumaLOG) corrective regimen sliding scale   SubCutaneous every 6 hours  lactulose Syrup 10 Gram(s) Oral two times a day  multivitamin 1 Tablet(s) Oral daily  nystatin Powder 1 Application(s) Topical three times a day  pantoprazole   Suspension 40 milliGRAM(s) Enteral Tube daily  PHENobarbital Injectable 130 milliGRAM(s) IV Push every 6 hours PRN  sodium chloride 0.9% lock flush 10 milliLiter(s) IV Push every 1 hour PRN  thiamine IVPB 500 milliGRAM(s) IV Intermittent daily    aspirin  chewable 81 milliGRAM(s) Oral daily  atorvastatin 40 milliGRAM(s) Oral at bedtime  chlorhexidine 0.12% Liquid 15 milliLiter(s) Oral Mucosa every 12 hours  chlorhexidine 4% Liquid 1 Application(s) Topical daily  chlorhexidine 4% Liquid 1 Application(s) Topical <User Schedule>  dexMEDEtomidine Infusion 0.8 MICROgram(s)/kG/Hr IV Continuous <Continuous>  folic acid 1 milliGRAM(s) Oral daily  heparin  Injectable 5000 Unit(s) SubCutaneous every 12 hours  insulin lispro (HumaLOG) corrective regimen sliding scale   SubCutaneous every 6 hours  lactulose Syrup 10 Gram(s) Oral two times a day  multivitamin 1 Tablet(s) Oral daily  norepinephrine Infusion 0.13 MICROgram(s)/kG/Min IV Continuous <Continuous>  nystatin Powder 1 Application(s) Topical three times a day  pantoprazole   Suspension 40 milliGRAM(s) Enteral Tube daily  PHENobarbital Injectable 130 milliGRAM(s) IV Push every 6 hours PRN  piperacillin/tazobactam IVPB.. 3.375 Gram(s) IV Intermittent every 8 hours  sodium chloride 0.9% lock flush 10 milliLiter(s) IV Push every 1 hour PRN  thiamine IVPB 500 milliGRAM(s) IV Intermittent daily  vancomycin  IVPB 1000 milliGRAM(s) IV Intermittent <User Schedule>  vancomycin  IVPB 1000 milliGRAM(s) IV Intermittent once    Drug Dosing Weight  Height (cm): 180.34 (2020 15:39)  Weight (kg): 125 (2020 11:30)  BMI (kg/m2): 38.4 (2020 11:30)  BSA (m2): 2.42 (2020 11:30)    CENTRAL LINE: [ x] YES [ ] NO  LOCATION:  [facility]     VILLATORO: [x ] YES [ ] NO          A-LINE:  [x ] YES [ ] NO  LOCATION:         ICU Vital Signs Last 24 Hrs  T(C): 37.9 (10 Luiz 2020 23:45), Max: 38.9 (10 Luiz 2020 02:45)  T(F): 100.3 (10 Luiz 2020 23:45), Max: 102.1 (10 Luiz 2020 02:45)  HR: 95 (2020 00:09) (81 - 129)  BP: 120/68 (10 Luiz 2020 23:00) (85/61 - 124/72)  BP(mean): 80 (10 Luiz 2020 23:00) (67 - 98)  ABP: 126/76 (10 Luiz 2020 23:30) (93/80 - 150/90)  ABP(mean): 95 (10 Luiz 2020 23:30) (79 - 235)  RR: 8 (10 Luiz 2020 23:30) (0 - 24)  SpO2: 100% (2020 00:09) (85% - 100%)      ABG - ( 2020 20:10 )  pH, Arterial: 7.36  pH, Blood: x     /  pCO2: 52    /  pO2: 53    / HCO3: 28    / Base Excess: 2.3   /  SaO2: 84             @ 07:01  -  01-10 @ 07:00  --------------------------------------------------------  IN: 2584 mL / OUT: 1135 mL / NET: 1449 mL        Mode: AC/ CMV (Assist Control/ Continuous Mandatory Ventilation)  RR (machine): 14  TV (machine): 500  FiO2: 40  PEEP: 5  ITime: 1  MAP: 11  PIP: 27      PHYSICAL EXAM:    GENERAL: male in bed   HEAD:  Atraumatic, Normocephalic  EYES: PERRLA, conjunctiva and sclera clear  NECK: Supple, normal appearance, No JVD; Normal thyroid; Trachea midline  NERVOUS SYSTEM:  Alert & Oriented X0  CHEST/LUNG: No chest deformity; Normal percussion bilaterally;   HEART: Regular rate and rhythm; No murmurs, rubs, or gallops  ABDOMEN: Soft, Nontender, Nondistended; Bowel sounds present  EXTREMITIES:  2+ Peripheral Pulses, No clubbing, cyanosis, or edema        LABS:  CBC Full  -  ( 10 Luiz 2020 09:27 )  WBC Count : 4.90 K/uL  RBC Count : 4.60 M/uL  Hemoglobin : 15.0 g/dL  Hematocrit : 47.1 %  Platelet Count - Automated : 40 K/uL  Mean Cell Volume : 102.4 fl  Mean Cell Hemoglobin : 32.6 pg  Mean Cell Hemoglobin Concentration : 31.8 gm/dL  Auto Neutrophil # : 2.50 K/uL  Auto Lymphocyte # : 1.72 K/uL  Auto Monocyte # : 0.29 K/uL  Auto Eosinophil # : 0.00 K/uL  Auto Basophil # : 0.00 K/uL  Auto Neutrophil % : 40.0 %  Auto Lymphocyte % : 35.0 %  Auto Monocyte % : 6.0 %  Auto Eosinophil % : 0.0 %  Auto Basophil % : 0.0 %    10    145  |  107  |  65<H>  ----------------------------<  173<H>  4.4   |  28  |  1.30    Ca    10.6<H>      10 Luiz 2020 03:26  Phos  3.4     01-10  Mg     2.4     01-10      PT/INR - ( 10 Luiz 2020 09:27 )   PT: 14.3 sec;   INR: 1.28 ratio         PTT - ( 10 Luiz 2020 09:27 )  PTT:47.4 sec  Urinalysis Basic - ( 2020 12:15 )    Color: Marleny / Appearance: very cloudy / S.025 / pH: x  Gluc: x / Ketone: Trace  / Bili: Moderate / Urobili: 4   Blood: x / Protein: 100 / Nitrite: Positive   Leuk Esterase: Trace / RBC: See Note /HPF / WBC 0-2 /HPF   Sq Epi: x / Non Sq Epi: Occasional /HPF / Bacteria: Many /HPF      Culture Results:   Normal Respiratory Sara present (01-10 @ 00:44)  Culture Results:   No growth (01-10 @ 00:36)  Culture Results:   No growth to date. ( @ 10:08)  Culture Results:   No growth to date. ( @ 10:08)      RADIOLOGY & ADDITIONAL STUDIES REVIEWED:  yes     [ ]GOALS OF CARE DISCUSSION WITH PATIENT/FAMILY/PROXY:    CRITICAL CARE TIME SPENT: 35 minutes INTERVAL HPI/OVERNIGHT EVENTS:   Patient seen and examined at the bedside.   Patient continues to spike fever.   all cultures have been negative.  Likely drug fever so discontinued antibiotics.  patient is on pressors.  On pheno and precedex for CIWA protocol.  12 lead EKG done and started on seroquel.  will repeat EKG in am      PRESSORS: [x ] YES [ ] NO  WHICH:NE      Antimicrobial:  piperacillin/tazobactam IVPB.. 3.375 Gram(s) IV Intermittent every 8 hours  vancomycin  IVPB 1000 milliGRAM(s) IV Intermittent <User Schedule>  vancomycin  IVPB 1000 milliGRAM(s) IV Intermittent once    Cardiovascular:  norepinephrine Infusion 0.13 MICROgram(s)/kG/Min IV Continuous <Continuous>    Pulmonary:    Hematalogic:  aspirin  chewable 81 milliGRAM(s) Oral daily  heparin  Injectable 5000 Unit(s) SubCutaneous every 12 hours    Other:  atorvastatin 40 milliGRAM(s) Oral at bedtime  chlorhexidine 0.12% Liquid 15 milliLiter(s) Oral Mucosa every 12 hours  chlorhexidine 4% Liquid 1 Application(s) Topical daily  chlorhexidine 4% Liquid 1 Application(s) Topical <User Schedule>  dexMEDEtomidine Infusion 0.8 MICROgram(s)/kG/Hr IV Continuous <Continuous>  folic acid 1 milliGRAM(s) Oral daily  insulin lispro (HumaLOG) corrective regimen sliding scale   SubCutaneous every 6 hours  lactulose Syrup 10 Gram(s) Oral two times a day  multivitamin 1 Tablet(s) Oral daily  nystatin Powder 1 Application(s) Topical three times a day  pantoprazole   Suspension 40 milliGRAM(s) Enteral Tube daily  PHENobarbital Injectable 130 milliGRAM(s) IV Push every 6 hours PRN  sodium chloride 0.9% lock flush 10 milliLiter(s) IV Push every 1 hour PRN  thiamine IVPB 500 milliGRAM(s) IV Intermittent daily    aspirin  chewable 81 milliGRAM(s) Oral daily  atorvastatin 40 milliGRAM(s) Oral at bedtime  chlorhexidine 0.12% Liquid 15 milliLiter(s) Oral Mucosa every 12 hours  chlorhexidine 4% Liquid 1 Application(s) Topical daily  chlorhexidine 4% Liquid 1 Application(s) Topical <User Schedule>  dexMEDEtomidine Infusion 0.8 MICROgram(s)/kG/Hr IV Continuous <Continuous>  folic acid 1 milliGRAM(s) Oral daily  heparin  Injectable 5000 Unit(s) SubCutaneous every 12 hours  insulin lispro (HumaLOG) corrective regimen sliding scale   SubCutaneous every 6 hours  lactulose Syrup 10 Gram(s) Oral two times a day  multivitamin 1 Tablet(s) Oral daily  norepinephrine Infusion 0.13 MICROgram(s)/kG/Min IV Continuous <Continuous>  nystatin Powder 1 Application(s) Topical three times a day  pantoprazole   Suspension 40 milliGRAM(s) Enteral Tube daily  PHENobarbital Injectable 130 milliGRAM(s) IV Push every 6 hours PRN  piperacillin/tazobactam IVPB.. 3.375 Gram(s) IV Intermittent every 8 hours  sodium chloride 0.9% lock flush 10 milliLiter(s) IV Push every 1 hour PRN  thiamine IVPB 500 milliGRAM(s) IV Intermittent daily  vancomycin  IVPB 1000 milliGRAM(s) IV Intermittent <User Schedule>  vancomycin  IVPB 1000 milliGRAM(s) IV Intermittent once    Drug Dosing Weight  Height (cm): 180.34 (2020 15:39)  Weight (kg): 125 (2020 11:30)  BMI (kg/m2): 38.4 (2020 11:30)  BSA (m2): 2.42 (2020 11:30)    CENTRAL LINE: [ x] YES [ ] NO  LOCATION:  [facility]     VILLATORO: [x ] YES [ ] NO          A-LINE:  [x ] YES [ ] NO  LOCATION:         ICU Vital Signs Last 24 Hrs  T(C): 37.9 (10 Luiz 2020 23:45), Max: 38.9 (10 Luiz 2020 02:45)  T(F): 100.3 (10 Luiz 2020 23:45), Max: 102.1 (10 Luiz 2020 02:45)  HR: 95 (2020 00:09) (81 - 129)  BP: 120/68 (10 Luiz 2020 23:00) (85/61 - 124/72)  BP(mean): 80 (10 Luiz 2020 23:00) (67 - 98)  ABP: 126/76 (10 Luiz 2020 23:30) (93/80 - 150/90)  ABP(mean): 95 (10 Luiz 2020 23:30) (79 - 235)  RR: 8 (10 Luiz 2020 23:30) (0 - 24)  SpO2: 100% (2020 00:09) (85% - 100%)      ABG - ( 2020 20:10 )  pH, Arterial: 7.36  pH, Blood: x     /  pCO2: 52    /  pO2: 53    / HCO3: 28    / Base Excess: 2.3   /  SaO2: 84             @ 07:01  -  01-10 @ 07:00  --------------------------------------------------------  IN: 2584 mL / OUT: 1135 mL / NET: 1449 mL        Mode: AC/ CMV (Assist Control/ Continuous Mandatory Ventilation)  RR (machine): 14  TV (machine): 500  FiO2: 40  PEEP: 5  ITime: 1  MAP: 11  PIP: 27      PHYSICAL EXAM:    GENERAL: male in bed   HEAD:  Atraumatic, Normocephalic  EYES: PERRLA, conjunctiva and sclera clear  NECK: Supple, normal appearance, No JVD; Normal thyroid; Trachea midline  NERVOUS SYSTEM:  Alert & Oriented X0  CHEST/LUNG: No chest deformity; Normal percussion bilaterally;   HEART: Regular rate and rhythm; No murmurs, rubs, or gallops  ABDOMEN: Soft, Nontender, Nondistended; Bowel sounds present  EXTREMITIES:  2+ Peripheral Pulses, No clubbing, cyanosis, or edema        LABS:  CBC Full  -  ( 10 Luiz 2020 09:27 )  WBC Count : 4.90 K/uL  RBC Count : 4.60 M/uL  Hemoglobin : 15.0 g/dL  Hematocrit : 47.1 %  Platelet Count - Automated : 40 K/uL  Mean Cell Volume : 102.4 fl  Mean Cell Hemoglobin : 32.6 pg  Mean Cell Hemoglobin Concentration : 31.8 gm/dL  Auto Neutrophil # : 2.50 K/uL  Auto Lymphocyte # : 1.72 K/uL  Auto Monocyte # : 0.29 K/uL  Auto Eosinophil # : 0.00 K/uL  Auto Basophil # : 0.00 K/uL  Auto Neutrophil % : 40.0 %  Auto Lymphocyte % : 35.0 %  Auto Monocyte % : 6.0 %  Auto Eosinophil % : 0.0 %  Auto Basophil % : 0.0 %    01-10    145  |  107  |  65<H>  ----------------------------<  173<H>  4.4   |  28  |  1.30    Ca    10.6<H>      10 Luiz 2020 03:26  Phos  3.4     01-10  Mg     2.4     01-10      PT/INR - ( 10 Luiz 2020 09:27 )   PT: 14.3 sec;   INR: 1.28 ratio         PTT - ( 10 Luiz 2020 09:27 )  PTT:47.4 sec  Urinalysis Basic - ( 2020 12:15 )    Color: Marleny / Appearance: very cloudy / S.025 / pH: x  Gluc: x / Ketone: Trace  / Bili: Moderate / Urobili: 4   Blood: x / Protein: 100 / Nitrite: Positive   Leuk Esterase: Trace / RBC: See Note /HPF / WBC 0-2 /HPF   Sq Epi: x / Non Sq Epi: Occasional /HPF / Bacteria: Many /HPF      Culture Results:   Normal Respiratory Sara present (01-10 @ 00:44)  Culture Results:   No growth (01-10 @ 00:36)  Culture Results:   No growth to date. ( @ 10:08)  Culture Results:   No growth to date. ( @ 10:08)      RADIOLOGY & ADDITIONAL STUDIES REVIEWED:  yes     [ ]GOALS OF CARE DISCUSSION WITH PATIENT/FAMILY/PROXY:    CRITICAL CARE TIME SPENT: 35 minutes

## 2020-01-11 NOTE — PROGRESS NOTE ADULT - SUBJECTIVE AND OBJECTIVE BOX
INTERVAL HPI/OVERNIGHT EVENTS:       Antimicrobial:    Cardiovascular:  norepinephrine Infusion 0.13 MICROgram(s)/kG/Min IV Continuous <Continuous>    Pulmonary:    Hematalogic:  aspirin  chewable 81 milliGRAM(s) Oral daily  heparin  Injectable 5000 Unit(s) SubCutaneous every 12 hours    Other:  atorvastatin 40 milliGRAM(s) Oral at bedtime  chlorhexidine 0.12% Liquid 15 milliLiter(s) Oral Mucosa every 12 hours  chlorhexidine 4% Liquid 1 Application(s) Topical daily  chlorhexidine 4% Liquid 1 Application(s) Topical <User Schedule>  dexMEDEtomidine Infusion 0.8 MICROgram(s)/kG/Hr IV Continuous <Continuous>  folic acid 1 milliGRAM(s) Oral daily  insulin lispro (HumaLOG) corrective regimen sliding scale   SubCutaneous every 6 hours  lactulose Syrup 10 Gram(s) Oral two times a day  morphine  - Injectable 4 milliGRAM(s) IV Push every 4 hours PRN  multivitamin 1 Tablet(s) Oral daily  nystatin Powder 1 Application(s) Topical three times a day  pantoprazole   Suspension 40 milliGRAM(s) Enteral Tube daily  PHENobarbital Injectable 130 milliGRAM(s) IV Push every 6 hours PRN  potassium phosphate IVPB 15 milliMole(s) IV Intermittent once  QUEtiapine 25 milliGRAM(s) Oral every 12 hours  sodium chloride 0.9% lock flush 10 milliLiter(s) IV Push every 1 hour PRN  thiamine IVPB 500 milliGRAM(s) IV Intermittent daily      Drug Dosing Weight  Height (cm): 180.34 (2020 15:39)  Weight (kg): 125 (2020 11:30)  BMI (kg/m2): 38.4 (2020 11:30)  BSA (m2): 2.42 (2020 11:30)    CENTRAL LINE: [ x] YES [ ] NO  LOCATION:   DATE INSERTED:    VILLATORO: [x ] YES [ ] NO    DATE INSERTED:    A-LINE:  [ ] YES [ ] NO  LOCATION:   DATE INSERTED:    PMH/Social Hx/Fam Hx -reviewed admission note, no change since admission  PAST MEDICAL & SURGICAL HISTORY:  HTN (hypertension)  Atrial fibrillation  No significant past surgical history      T(C): 38.1 (20 @ 05:00), Max: 38.6 (01-10-20 @ 13:30)  HR: 99 (20 @ 11:45)  BP: 95/56 (20 @ 11:00)  BP(mean): 63 (20 @ 11:00)  ABP: 97/68 (20 @ 11:45)  ABP(mean): 77 (20 @ 11:45)  RR: 14 (20 @ 11:45)  SpO2: 93% (20 @ 11:45)  Wt(kg): --    ABG - ( 2020 08:21 )  pH, Arterial: 7.36  pH, Blood: x     /  pCO2: 54    /  pO2: 58    / HCO3: 29    / Base Excess: 2.8   /  SaO2: 87                    01-10 @ 07:  -   @ 07:00  --------------------------------------------------------  IN: 2245.9 mL / OUT: 1635 mL / NET: 610.9 mL        Mode: AC/ CMV (Assist Control/ Continuous Mandatory Ventilation)  RR (machine): 14  TV (machine): 500  FiO2: 40  PEEP: 5  ITime: 1  MAP: 13  PIP: 37      PHYSICAL EXAM:    GENERAL: No signs of distress, comfortable  HEAD: Atraumatic, Normocephalic  EYES: EOMI, PERRLA  ENMT: No erythema, exudates, or enlargement, Moist mucous membranes + ETT  NECK: Supple, normal appearance, No JVD; [  ] central line (if applicable)  CHEST/LUNG: No chest deformity, fair bilateral air entry; No rales, rhonchi, wheezing; crackles  HEART: Regular rate and rhythm; No murmurs, rubs, or gallops;   ABDOMEN: Soft, Nontender, Nondistended; Bowel sounds present  EXTREMITIES:  + Peripheral Pulses, No clubbing, cyanosis, or edema  NERVOUS SYSTEM: + encephalopathy   LYMPH: No lymphadenopathy noted  SKIN: No rashes or lesions; good turgor, warm, dry      LABS:  CBC Full  -  ( 2020 06:35 )  WBC Count : 4.28 K/uL  RBC Count : 4.66 M/uL  Hemoglobin : 14.9 g/dL  Hematocrit : 48.2 %  Platelet Count - Automated : 40 K/uL  Mean Cell Volume : 103.4 fl  Mean Cell Hemoglobin : 32.0 pg  Mean Cell Hemoglobin Concentration : 30.9 gm/dL  Auto Neutrophil # : x  Auto Lymphocyte # : x  Auto Monocyte # : x  Auto Eosinophil # : x  Auto Basophil # : x  Auto Neutrophil % : x  Auto Lymphocyte % : x  Auto Monocyte % : x  Auto Eosinophil % : x  Auto Basophil % : x        148<H>  |  108  |  57<H>  ----------------------------<  170<H>  4.6   |  31  |  1.01    Ca    11.0<H>      2020 06:35  Phos  2.0       Mg     2.5         TPro  6.3  /  Alb  1.9<L>  /  TBili  6.5<H>  /  DBili  x   /  AST  222<H>  /  ALT  228<H>  /  AlkPhos  182<H>      PT/INR - ( 10 Luiz 2020 09:27 )   PT: 14.3 sec;   INR: 1.28 ratio         PTT - ( 10 Luiz 2020 09:27 )  PTT:47.4 sec  Urinalysis Basic - ( 2020 12:15 )    Color: Marleny / Appearance: very cloudy / S.025 / pH: x  Gluc: x / Ketone: Trace  / Bili: Moderate / Urobili: 4   Blood: x / Protein: 100 / Nitrite: Positive   Leuk Esterase: Trace / RBC: See Note /HPF / WBC 0-2 /HPF   Sq Epi: x / Non Sq Epi: Occasional /HPF / Bacteria: Many /HPF      Culture Results:   Normal Respiratory Sara present (01-10 @ 00:44)  Culture Results:   No growth (01-10 @ 00:36)  Culture Results:   No growth to date. ( @ 10:08)  Culture Results:   No growth to date. ( @ 10:08)      RADIOLOGY & ADDITIONAL STUDIES REVIEWED     CXR:  < from: Xray Chest 1 View-PORTABLE IMMEDIATE (01.10.20 @ 10:09) >    EXAM:  XR CHEST PORTABLE IMMED 1V                            PROCEDURE DATE:  01/10/2020          INTERPRETATION:  Chest one view    HISTORY: Right upper lobe pneumonia    COMPARISON STUDY: 2020    Frontal expiratory view of the chest shows theheart to be similar in size. Endotracheal tube, feeding tube and right subclavian line remain present. The lungs show partial clearing of the right upper lobe and there is no evidence of pneumothorax nor pleural effusion.    IMPRESSION:  Partial clearing, right upper lobe.    Thank you for the courtesy of this referral.                BALAJI PITT M.D., ATTENDING RADIOLOGIST  This document has been electronically signed. Luiz 10 2020 10:33AM                < end of copied text >    IMPRESSION:  PAST MEDICAL & SURGICAL HISTORY:  HTN (hypertension)  Atrial fibrillation  No significant past surgical history   p/w         IMP: This is a 69 yr old man with  afib (on Pradaxa), HTN and alcohol abuse presents to the ED with chief complaint of weakness and shortness of breath. Patient is initially admitted to medical floor for CHF exacerbation, afib with RVR, alcohol withdrawal and sepsis 2/2 cellulitis. Patient is AAOx2-3 at baseline, noted lethargic by nurse this morning. Patient is on CIWA protocol and received total 8 mg of ativan Iv push in last 24 hours. RRT was called for respiratory distress and lethargy, patient is minimally arousable to sternal rub, not able to follow commands.  + ASP PNA on antibx. . intubated . Pat is spiking high fevers, neg cultures and neg cxr... will hold antibx . Possible drug related fevers.    Pat requires multiple meds for sedation, will add seroquel after EKG        Plan:     Neuro:   AAOx2-3 at baseline   Agitation getting better  Noted to be Altered mental status on admission likely from hypercarbia and medication induced (on ativan as per CIWA)  pt on ativan and phenobarbital PRN  will add seroquel  Intubated in ICU on      Respi:   Acute hypercapnic respiratory failure:   likely from combination of CHF and medication induced form ativan   FIO2 increased to 50% last night, was changed to 40%.   ABG -  - PH 7.36  Started on ETT on .   pat will need trach if unable to extubate    CVS:   # Afib:   history of Afib   rate controlled with metoprolol, on pradexa for anticoagulation   will hold pradexa for now   held Heparin drip from Thrombocytopenia.    CHF:  admitted with CHF exacerbation   d/c lasix   stict I/O and daily weight   echo- EF - 55%    GI:   Tube fed  NG tube in place   Pt noted to be constipated, added on lactulose.  # Transaminitis:    /  on admission -stable   tbili 4.3 on admission  etiology unclear at this time  hepatitis panel - negative   CT abdomen shows now CBD dilatation, but does show hypodense lesions  USG abd- Contracted bladder  GI consulted - Dr. Martel.    Heme:  # Thrombocytopenia.    Platelets on admission 59 -> 55 -> 44->48->52->46  Blood smear 1/10- WNL.   slight elevation in D dimer 2200  f/u frey 13    Endo:   #DM:   HbA1c of 6.5  sliding scale q6 while NPO     ID:  # cellulitis:   Fever- 101F today   started on Vanco, zosyn  hold antibx and monitor       Renal:   Creatine levels improved after IV bolus. REI resolving    urine lytes- WNL  Hypercalcemia+ f/u vit D, PTHrp    PPx:   was on pradexa - holding   protonix IV for GI  prophylaxis          GOALS OF CARE DISCUSSION WITH PATIENT/FAMILY/PROXY/ icu team on  rounds    CRITICAL CARE TIME SPENT: 38 minutes

## 2020-01-11 NOTE — PROGRESS NOTE ADULT - ASSESSMENT
68 y/o male with PMHx of afib (on Pradaxa), HTN and alcohol abuse presents to the ED with chief complaint of weakness and shortness of breath. Patient is initially admitted to medical floor for CHF exacerbation, afib with RVR, alcohol withdrawal and sepsis 2/2 cellulitis. Patient is AAOx2-3 at baseline, noted lethargic by nurse this morning. Patient is on CIWA protocol and received total 8 mg of ativan Iv push in last 24 hours. RRT was called for respiratory distress and lethargy, patient is minimally arousable to sternal rub, not able to follow commands.     Plan:     Neuro:   AAOx2-3 at baseline   Agitation getting better  Noted to be Altered mental status on admission likely from hypercarbia and medication induced (on ativan as per [facility])  pt on ativan and phenobarbital PRN  Intubated in ICU on 1/5     Respi:   Acute hypercapnic respiratory failure:   likely from combination of CHF and medication induced form ativan   FIO2 increased to 50% last night, was changed to 40%.   ABG - 1/9 - PH 7.36  Started on ETT on 1/5.     CVS:   # Afib:   history of Afib   rate controlled with metoprolol, on pradexa for anticoagulation   will hold pradexa for now   held Heparin drip from Thrombocytopenia.    CHF:  admitted with CHF exacerbation   d/c lasix   stict I/O and daily weight   echo- EF - 55%    GI:   Tube fed  NG tube in place   Pt noted to be constipated, added on lactulose.  # Transaminitis:    /  on admission -stable   tbili 4.3 on admission  etiology unclear at this time  hepatitis panel - negative   CT abdomen shows now CBD dilatation, but does show hypodense lesions  USG abd- Contracted bladder  GI consulted - Dr. Martel.    Heme:  # Thrombocytopenia.    Platelets on admission 59 -> 55 -> 44->48->52->46  Blood smear 1/10- WNL.   slight elevation in D dimer 2200  f/u frey 13    Endo:   #DM:   HbA1c of 6.5  sliding scale q6 while NPO     ID:  # cellulitis:   Fever- 101F today   started on Vanco, zosyn  f/u VT, BLD CX  Sputum cx- GP cocci in pairs       Renal:   Creatine levels improved after IV bolus. REI resolving    urine lytes- WNL  Hypercalcemia+ f/u vit D, PTHrp    PPx:   was on pradexa - holding   protonix IV for GI  prophylaxis

## 2020-01-12 NOTE — PROGRESS NOTE ADULT - SUBJECTIVE AND OBJECTIVE BOX
INTERVAL HPI/OVERNIGHT EVENTS:  intubated       Antimicrobial:    Cardiovascular:  norepinephrine Infusion 0.13 MICROgram(s)/kG/Min IV Continuous <Continuous>    Pulmonary:    Hematalogic:  aspirin  chewable 81 milliGRAM(s) Oral daily  heparin  Injectable 5000 Unit(s) SubCutaneous every 12 hours    Other:  atorvastatin 40 milliGRAM(s) Oral at bedtime  chlorhexidine 0.12% Liquid 15 milliLiter(s) Oral Mucosa every 12 hours  chlorhexidine 4% Liquid 1 Application(s) Topical daily  dexMEDEtomidine Infusion 0.8 MICROgram(s)/kG/Hr IV Continuous <Continuous>  folic acid 1 milliGRAM(s) Oral daily  insulin lispro (HumaLOG) corrective regimen sliding scale   SubCutaneous every 6 hours  morphine  - Injectable 4 milliGRAM(s) IV Push every 4 hours PRN  multivitamin 1 Tablet(s) Oral daily  nystatin Powder 1 Application(s) Topical three times a day  pantoprazole   Suspension 40 milliGRAM(s) Enteral Tube daily  PHENobarbital Injectable 130 milliGRAM(s) IV Push every 6 hours PRN  QUEtiapine 25 milliGRAM(s) Oral every 12 hours  sodium chloride 0.9% lock flush 10 milliLiter(s) IV Push every 1 hour PRN  thiamine IVPB 500 milliGRAM(s) IV Intermittent daily      Drug Dosing Weight  Height (cm): 180.34 (03 Jan 2020 15:39)  Weight (kg): 125 (05 Jan 2020 11:30)  BMI (kg/m2): 38.4 (05 Jan 2020 11:30)  BSA (m2): 2.42 (05 Jan 2020 11:30)    CENTRAL LINE: [ ] YES [ ] NO  LOCATION:   DATE INSERTED:    VILLATORO: [ ] YES [ ] NO    DATE INSERTED:    A-LINE:  [ ] YES [ ] NO  LOCATION:   DATE INSERTED:    [facility]/Social Hx/Fam Hx -reviewed admission note, no change since admission  PAST MEDICAL & SURGICAL HISTORY:  HTN (hypertension)  Atrial fibrillation  No significant past surgical history      T(C): 37.6 (01-12-20 @ 07:00), Max: 38.7 (01-12-20 @ 01:30)  HR: 111 (01-12-20 @ 10:30)  BP: 97/69 (01-12-20 @ 10:00)  BP(mean): 75 (01-12-20 @ 10:00)  ABP: 112/79 (01-12-20 @ 10:30)  ABP(mean): 90 (01-12-20 @ 10:30)  RR: 11 (01-12-20 @ 10:30)  SpO2: 100% (01-12-20 @ 10:30)  Wt(kg): --    ABG - ( 11 Jan 2020 08:21 )  pH, Arterial: 7.36  pH, Blood: x     /  pCO2: 54    /  pO2: 58    / HCO3: 29    / Base Excess: 2.8   /  SaO2: 87                    01-11 @ 07:01  -  01-12 @ 07:00  --------------------------------------------------------  IN: 2274.5 mL / OUT: 1400 mL / NET: 874.5 mL        Mode: AC/ CMV (Assist Control/ Continuous Mandatory Ventilation)  RR (machine): 14  TV (machine): 500  FiO2: 40  PEEP: 5  ITime: 1  MAP: 11  PIP: 35      PHYSICAL EXAM:    GENERAL: No signs of distress, comfortable  HEAD: Atraumatic, Normocephalic  EYES: EOMI, PERRLA  ENMT: No erythema, exudates, or enlargement, Moist mucous membranes +ETT  NECK: Supple, normal appearance, No JVD; [  ] central line (if applicable)  CHEST/LUNG: No chest deformity, fair bilateral air entry; No rales, rhonchi, wheezing; crackles  HEART: Regular rate and rhythm; No murmurs, rubs, or gallops;   ABDOMEN: Soft, Nontender, Nondistended; Bowel sounds present  EXTREMITIES:  + Peripheral Pulses, No clubbing, cyanosis, or edema  NERVOUS SYSTEM: arousable to voice   LYMPH: No lymphadenopathy noted  SKIN: No rashes or lesions; good turgor, warm, dry      LABS:  CBC Full  -  ( 12 Jan 2020 04:32 )  WBC Count : 3.50 K/uL  RBC Count : 4.35 M/uL  Hemoglobin : 14.3 g/dL  Hematocrit : 44.8 %  Platelet Count - Automated : 37 K/uL  Mean Cell Volume : 103.0 fl  Mean Cell Hemoglobin : 32.9 pg  Mean Cell Hemoglobin Concentration : 31.9 gm/dL  Auto Neutrophil # : x  Auto Lymphocyte # : x  Auto Monocyte # : x  Auto Eosinophil # : x  Auto Basophil # : x  Auto Neutrophil % : x  Auto Lymphocyte % : x  Auto Monocyte % : x  Auto Eosinophil % : x  Auto Basophil % : x    01-12    148<H>  |  111<H>  |  59<H>  ----------------------------<  153<H>  5.2   |  29  |  0.88    Ca    11.1<H>      12 Jan 2020 04:32  Phos  2.1     01-12  Mg     2.4     01-12    TPro  6.0  /  Alb  1.8<L>  /  TBili  5.8<H>  /  DBili  x   /  AST  228<H>  /  ALT  204<H>  /  AlkPhos  195<H>  01-12        Culture Results:   Normal Respiratory Sara present (01-10 @ 10:45)  Culture Results:   Normal Respiratory Sara present (01-10 @ 00:44)  Culture Results:   No growth (01-10 @ 00:36)      RADIOLOGY & ADDITIONAL STUDIES REVIEWED     CXR:< from: Xray Chest 1 View-PORTABLE IMMEDIATE (01.11.20 @ 10:50) >    EXAM:  XR CHEST PORTABLE IMMED 1V                            PROCEDURE DATE:  01/11/2020          INTERPRETATION:  Chest one view    HISTORY: COPD    COMPARISON STUDY: 1/10/2020    Frontal expiratory view of the chest shows the heart to be similar in size. Endotracheal tube and feeding tube remain present. The lungs show developing right lung infiltrate and there is no evidence of pneumothorax nor pleural effusion.    IMPRESSION:  Right lung infiltrate.    Thank you for the courtesy of this referral.                BALAJI PITT M.D., ATTENDING RADIOLOGIST  This document has been electronically signed. Jan 11 2020 12:36PM                < end of copied text >      IMPRESSION:  PAST MEDICAL & SURGICAL HISTORY:  HTN (hypertension)  Atrial fibrillation  No significant past surgical history   p/w         IMP: This is a 69 yr old man with  afib (on Pradaxa), HTN and alcohol abuse presents to the ED with chief complaint of weakness and shortness of breath. Patient is initially admitted to medical floor for CHF exacerbation, afib with RVR, alcohol withdrawal and sepsis 2/2 cellulitis. Patient is AAOx2-3 at baseline, noted lethargic by nurse this morning. Patient is on CIWA protocol and received total 8 mg of ativan Iv push in last 24 hours. RRT was called for respiratory distress and lethargy, patient is minimally arousable to sternal rub, not able to follow commands.  + ASP PNA on antibx. . intubated 1/5. Pat is spiking high fevers, neg cultures and neg cxr... will hold antibx . Possible drug related fevers.    Pat requires multiple meds for sedation, will add seroquel after EKG          Plan:    Neuro:   AAOx2-3 at baseline   Pt noted to be agitated, added Seroquel.   Noted to be Altered mental status on admission likely from hypercarbia and medication induced (on ativan as per CIWA)  pt on ativan and phenobarbital PRN    Intubated in ICU on 1/5     Respi:   Acute hypercapnic respiratory failure:   likely from combination of CHF and medication induced form ativan   FIO2 increased to 50% last night, was changed to 40%.   ABG - 1/9 - PH 7.36  Started on ETT on 1/5.   if unable to extubate, pat will need a tracheostomy     CVS:   # Afib:   history of Afib   rate controlled with metoprolol, on pradexa for anticoagulation   will hold pradexa for now   held Heparin drip from Thrombocytopenia.  ekg in am for qtc as seroquel was added    CHF:  admitted with CHF exacerbation   d/c lasix   stict I/O and daily weight   echo- EF - 55%    GI:   Tube fed  NG tube in place   # Transaminitis:    /  on admission -stable   tbili 4.3 on admission  etiology unclear at this time  hepatitis panel - negative   CT abdomen shows now CBD dilatation, but does show hypodense lesions  USG abd- Contracted bladder  GI consulted - Dr. Martel.    Heme:  # Thrombocytopenia.    Platelets on admission 59 -> 55 -> 44->48->52->46->34  Blood smear 1/10- WNL.   slight elevation in D dimer 2200  f/u frey 13  hit panel - negative    Endo:   #DM:   HbA1c of 6.5  sliding scale q6 while NPO     ID:  # cellulitis:   Fever- resolving   dc Vanco, zosyn (( asp pna)  bld cx- no growth   Sputum cx- GP cocci in pairs       Renal:   Creatine levels improved after IV bolus. REI resolving    urine lytes- WNL  Hypercalcemia+ f/u vit D, PTHrp    PPx:   was on pradexa - holding   protonix IV for GI  prophylaxis        GOALS OF CARE DISCUSSION WITH PATIENT/FAMILY/PROXY/ icu team on rounds    CRITICAL CARE TIME SPENT: 36 minutes

## 2020-01-12 NOTE — PROGRESS NOTE ADULT - SUBJECTIVE AND OBJECTIVE BOX
INTERVAL HPI/OVERNIGHT EVENTS: started on seroquel. Antibiotics d/c. monitor platelet , f/u hit.   Repeat EKG in am for qtc    PRESSORS: [ ] YES [ ] NO  WHICH:    ANTIBIOTICS:                      Antimicrobial:    Cardiovascular:  norepinephrine Infusion 0.13 MICROgram(s)/kG/Min IV Continuous <Continuous>    Pulmonary:    Hematalogic:  aspirin  chewable 81 milliGRAM(s) Oral daily  heparin  Injectable 5000 Unit(s) SubCutaneous every 12 hours    Other:  atorvastatin 40 milliGRAM(s) Oral at bedtime  chlorhexidine 0.12% Liquid 15 milliLiter(s) Oral Mucosa every 12 hours  chlorhexidine 4% Liquid 1 Application(s) Topical daily  dexMEDEtomidine Infusion 0.8 MICROgram(s)/kG/Hr IV Continuous <Continuous>  folic acid 1 milliGRAM(s) Oral daily  insulin lispro (HumaLOG) corrective regimen sliding scale   SubCutaneous every 6 hours  morphine  - Injectable 4 milliGRAM(s) IV Push every 4 hours PRN  multivitamin 1 Tablet(s) Oral daily  nystatin Powder 1 Application(s) Topical three times a day  pantoprazole   Suspension 40 milliGRAM(s) Enteral Tube daily  PHENobarbital Injectable 130 milliGRAM(s) IV Push every 6 hours PRN  QUEtiapine 25 milliGRAM(s) Oral every 12 hours  sodium chloride 0.9% lock flush 10 milliLiter(s) IV Push every 1 hour PRN  thiamine IVPB 500 milliGRAM(s) IV Intermittent daily    aspirin  chewable 81 milliGRAM(s) Oral daily  atorvastatin 40 milliGRAM(s) Oral at bedtime  chlorhexidine 0.12% Liquid 15 milliLiter(s) Oral Mucosa every 12 hours  chlorhexidine 4% Liquid 1 Application(s) Topical daily  dexMEDEtomidine Infusion 0.8 MICROgram(s)/kG/Hr IV Continuous <Continuous>  folic acid 1 milliGRAM(s) Oral daily  heparin  Injectable 5000 Unit(s) SubCutaneous every 12 hours  insulin lispro (HumaLOG) corrective regimen sliding scale   SubCutaneous every 6 hours  morphine  - Injectable 4 milliGRAM(s) IV Push every 4 hours PRN  multivitamin 1 Tablet(s) Oral daily  norepinephrine Infusion 0.13 MICROgram(s)/kG/Min IV Continuous <Continuous>  nystatin Powder 1 Application(s) Topical three times a day  pantoprazole   Suspension 40 milliGRAM(s) Enteral Tube daily  PHENobarbital Injectable 130 milliGRAM(s) IV Push every 6 hours PRN  QUEtiapine 25 milliGRAM(s) Oral every 12 hours  sodium chloride 0.9% lock flush 10 milliLiter(s) IV Push every 1 hour PRN  thiamine IVPB 500 milliGRAM(s) IV Intermittent daily    Drug Dosing Weight  Height (cm): 180.34 (03 Jan 2020 15:39)  Weight (kg): 125 (05 Jan 2020 11:30)  BMI (kg/m2): 38.4 (05 Jan 2020 11:30)  BSA (m2): 2.42 (05 Jan 2020 11:30)    CENTRAL LINE: [ ] YES [ ] NO  LOCATION:   DATE INSERTED:  REMOVE: [ ] YES [ ] NO  EXPLAIN:    VILLATORO: [ ] YES [ ] NO    DATE INSERTED:  REMOVE:  [ ] YES [ ] NO  EXPLAIN:    A-LINE:  [ ] YES [ ] NO  LOCATION:   DATE INSERTED:  REMOVE:  [ ] YES [ ] NO  EXPLAIN:    PMH -reviewed admission note, no change since admission    ICU Vital Signs Last 24 Hrs  T(C): 37.2 (11 Jan 2020 13:15), Max: 38.4 (11 Jan 2020 08:00)  T(F): 98.9 (11 Jan 2020 13:15), Max: 101.2 (11 Jan 2020 08:00)  HR: 85 (11 Jan 2020 22:45) (68 - 137)  BP: 102/63 (11 Jan 2020 22:00) (85/61 - 148/112)  BP(mean): 72 (11 Jan 2020 22:00) (63 - 119)  ABP: 104/72 (11 Jan 2020 22:45) (78/58 - 146/98)  ABP(mean): 83 (11 Jan 2020 22:45) (65 - 139)  RR: 19 (11 Jan 2020 22:45) (9 - 34)  SpO2: 100% (11 Jan 2020 22:45) (91% - 100%)      ABG - ( 11 Jan 2020 08:21 )  pH, Arterial: 7.36  pH, Blood: x     /  pCO2: 54    /  pO2: 58    / HCO3: 29    / Base Excess: 2.8   /  SaO2: 87                    01-10 @ 07:01  -  01-11 @ 07:00  --------------------------------------------------------  IN: 2245.9 mL / OUT: 1635 mL / NET: 610.9 mL        Mode: AC/ CMV (Assist Control/ Continuous Mandatory Ventilation)  RR (machine): 14  TV (machine): 500  FiO2: 40  PEEP: 5  ITime: 1  MAP: 12  PIP: 28      PHYSICAL EXAM:    GENERAL: male in bed   HEAD:  Atraumatic, Normocephalic  EYES: PERRLA, conjunctiva and sclera clear  NECK: Supple, normal appearance, No JVD; Normal thyroid; Trachea midline  NERVOUS SYSTEM:  Alert & Oriented X0  CHEST/LUNG: No chest deformity; Normal percussion bilaterally;   HEART: Regular rate and rhythm; No murmurs, rubs, or gallops  ABDOMEN: Soft, Nontender, Nondistended; Bowel sounds present  EXTREMITIES:  2+ Peripheral Pulses, No clubbing, cyanosis, or edema      LABS:  CBC Full  -  ( 11 Jan 2020 06:35 )  WBC Count : 4.28 K/uL  RBC Count : 4.66 M/uL  Hemoglobin : 14.9 g/dL  Hematocrit : 48.2 %  Platelet Count - Automated : 40 K/uL  Mean Cell Volume : 103.4 fl  Mean Cell Hemoglobin : 32.0 pg  Mean Cell Hemoglobin Concentration : 30.9 gm/dL  Auto Neutrophil # : x  Auto Lymphocyte # : x  Auto Monocyte # : x  Auto Eosinophil # : x  Auto Basophil # : x  Auto Neutrophil % : x  Auto Lymphocyte % : x  Auto Monocyte % : x  Auto Eosinophil % : x  Auto Basophil % : x    01-11    148<H>  |  108  |  57<H>  ----------------------------<  170<H>  4.6   |  31  |  1.01    Ca    11.0<H>      11 Jan 2020 06:35  Phos  2.0     01-11  Mg     2.5     01-11    TPro  6.3  /  Alb  1.9<L>  /  TBili  6.5<H>  /  DBili  x   /  AST  222<H>  /  ALT  228<H>  /  AlkPhos  182<H>  01-11    PT/INR - ( 10 Luiz 2020 09:27 )   PT: 14.3 sec;   INR: 1.28 ratio         PTT - ( 10 Luiz 2020 09:27 )  PTT:47.4 sec    Culture Results:   Normal Respiratory Sara present (01-10 @ 10:45)  Culture Results:   Normal Respiratory Sara present (01-10 @ 00:44)  Culture Results:   No growth (01-10 @ 00:36)  Culture Results:   No growth to date. (01-09 @ 10:08)  Culture Results:   No growth to date. (01-09 @ 10:08)      RADIOLOGY & ADDITIONAL STUDIES REVIEWED:      GOALS OF CARE DISCUSSION WITH PATIENT/FAMILY/PROXY:    CRITICAL CARE TIME SPENT: 35 minutes INTERVAL HPI/OVERNIGHT EVENTS: started on seroquel. Antibiotics d/c. Platelets dropped to 34k. Hit panel negative.   Repeat EKG in am for qtc    PRESSORS: [ x] YES [ ] NO  WHICH: NE     ANTIBIOTICS:                      Antimicrobial:    Cardiovascular:  norepinephrine Infusion 0.13 MICROgram(s)/kG/Min IV Continuous <Continuous>    Pulmonary:    Hematalogic:  aspirin  chewable 81 milliGRAM(s) Oral daily  heparin  Injectable 5000 Unit(s) SubCutaneous every 12 hours    Other:  atorvastatin 40 milliGRAM(s) Oral at bedtime  chlorhexidine 0.12% Liquid 15 milliLiter(s) Oral Mucosa every 12 hours  chlorhexidine 4% Liquid 1 Application(s) Topical daily  dexMEDEtomidine Infusion 0.8 MICROgram(s)/kG/Hr IV Continuous <Continuous>  folic acid 1 milliGRAM(s) Oral daily  insulin lispro (HumaLOG) corrective regimen sliding scale   SubCutaneous every 6 hours  morphine  - Injectable 4 milliGRAM(s) IV Push every 4 hours PRN  multivitamin 1 Tablet(s) Oral daily  nystatin Powder 1 Application(s) Topical three times a day  pantoprazole   Suspension 40 milliGRAM(s) Enteral Tube daily  PHENobarbital Injectable 130 milliGRAM(s) IV Push every 6 hours PRN  QUEtiapine 25 milliGRAM(s) Oral every 12 hours  sodium chloride 0.9% lock flush 10 milliLiter(s) IV Push every 1 hour PRN  thiamine IVPB 500 milliGRAM(s) IV Intermittent daily    aspirin  chewable 81 milliGRAM(s) Oral daily  atorvastatin 40 milliGRAM(s) Oral at bedtime  chlorhexidine 0.12% Liquid 15 milliLiter(s) Oral Mucosa every 12 hours  chlorhexidine 4% Liquid 1 Application(s) Topical daily  dexMEDEtomidine Infusion 0.8 MICROgram(s)/kG/Hr IV Continuous <Continuous>  folic acid 1 milliGRAM(s) Oral daily  heparin  Injectable 5000 Unit(s) SubCutaneous every 12 hours  insulin lispro (HumaLOG) corrective regimen sliding scale   SubCutaneous every 6 hours  morphine  - Injectable 4 milliGRAM(s) IV Push every 4 hours PRN  multivitamin 1 Tablet(s) Oral daily  norepinephrine Infusion 0.13 MICROgram(s)/kG/Min IV Continuous <Continuous>  nystatin Powder 1 Application(s) Topical three times a day  pantoprazole   Suspension 40 milliGRAM(s) Enteral Tube daily  PHENobarbital Injectable 130 milliGRAM(s) IV Push every 6 hours PRN  QUEtiapine 25 milliGRAM(s) Oral every 12 hours  sodium chloride 0.9% lock flush 10 milliLiter(s) IV Push every 1 hour PRN  thiamine IVPB 500 milliGRAM(s) IV Intermittent daily    Drug Dosing Weight  Height (cm): 180.34 (03 Jan 2020 15:39)  Weight (kg): 125 (05 Jan 2020 11:30)  BMI (kg/m2): 38.4 (05 Jan 2020 11:30)  BSA (m2): 2.42 (05 Jan 2020 11:30)    CENTRAL LINE: [ X] YES [ ] NO  LOCATION: [facility]  DATE INSERTED: 1/9  REMOVE: [ ] YES [ ] NO  EXPLAIN:    VILLATORO: [ X] YES [ ] NO    DATE INSERTED:  REMOVE:  [ ] YES [ ] NO  EXPLAIN:    A-LINE:  [ ] YES [ ] NO  LOCATION:   DATE INSERTED:  REMOVE:  [ ] YES [ ] NO  EXPLAIN:    PMH -reviewed admission note, no change since admission    ICU Vital Signs Last 24 Hrs  T(C): 37.2 (11 Jan 2020 13:15), Max: 38.4 (11 Jan 2020 08:00)  T(F): 98.9 (11 Jan 2020 13:15), Max: 101.2 (11 Jan 2020 08:00)  HR: 85 (11 Jan 2020 22:45) (68 - 137)  BP: 102/63 (11 Jan 2020 22:00) (85/61 - 148/112)  BP(mean): 72 (11 Jan 2020 22:00) (63 - 119)  ABP: 104/72 (11 Jan 2020 22:45) (78/58 - 146/98)  ABP(mean): 83 (11 Jan 2020 22:45) (65 - 139)  RR: 19 (11 Jan 2020 22:45) (9 - 34)  SpO2: 100% (11 Jan 2020 22:45) (91% - 100%)      ABG - ( 11 Jan 2020 08:21 )  pH, Arterial: 7.36  pH, Blood: x     /  pCO2: 54    /  pO2: 58    / HCO3: 29    / Base Excess: 2.8   /  SaO2: 87                    01-10 @ 07:01  -  01-11 @ 07:00  --------------------------------------------------------  IN: 2245.9 mL / OUT: 1635 mL / NET: 610.9 mL        Mode: AC/ CMV (Assist Control/ Continuous Mandatory Ventilation)  RR (machine): 14  TV (machine): 500  FiO2: 40  PEEP: 5  ITime: 1  MAP: 12  PIP: 28      PHYSICAL EXAM:    GENERAL: male in bed   HEAD:  Atraumatic, Normocephalic  EYES: PERRLA, conjunctiva and sclera clear  NECK: RSC line placed.   NERVOUS SYSTEM:  Alert & Oriented X0  CHEST/LUNG: laboured breathing. Crackles and mild wheezing b/l  HEART: Regular rate and rhythm; No murmurs, rubs, or gallops  ABDOMEN: Soft, Nontender, Nondistended; Bowel sounds present  EXTREMITIES:  2+ Peripheral Pulses, No clubbing, cyanosis, or edema      LABS:  CBC Full  -  ( 11 Jan 2020 06:35 )  WBC Count : 4.28 K/uL  RBC Count : 4.66 M/uL  Hemoglobin : 14.9 g/dL  Hematocrit : 48.2 %  Platelet Count - Automated : 40 K/uL  Mean Cell Volume : 103.4 fl  Mean Cell Hemoglobin : 32.0 pg  Mean Cell Hemoglobin Concentration : 30.9 gm/dL  Auto Neutrophil # : x  Auto Lymphocyte # : x  Auto Monocyte # : x  Auto Eosinophil # : x  Auto Basophil # : x  Auto Neutrophil % : x  Auto Lymphocyte % : x  Auto Monocyte % : x  Auto Eosinophil % : x  Auto Basophil % : x    01-11    148<H>  |  108  |  57<H>  ----------------------------<  170<H>  4.6   |  31  |  1.01    Ca    11.0<H>      11 Jan 2020 06:35  Phos  2.0     01-11  Mg     2.5     01-11    TPro  6.3  /  Alb  1.9<L>  /  TBili  6.5<H>  /  DBili  x   /  AST  222<H>  /  ALT  228<H>  /  AlkPhos  182<H>  01-11    PT/INR - ( 10 Luiz 2020 09:27 )   PT: 14.3 sec;   INR: 1.28 ratio         PTT - ( 10 Luiz 2020 09:27 )  PTT:47.4 sec    Culture Results:   Normal Respiratory Sara present (01-10 @ 10:45)  Culture Results:   Normal Respiratory Sara present (01-10 @ 00:44)  Culture Results:   No growth (01-10 @ 00:36)  Culture Results:   No growth to date. (01-09 @ 10:08)  Culture Results:   No growth to date. (01-09 @ 10:08)      RADIOLOGY & ADDITIONAL STUDIES REVIEWED:      GOALS OF CARE DISCUSSION WITH PATIENT/FAMILY/PROXY:    CRITICAL CARE TIME SPENT: 35 minutes

## 2020-01-12 NOTE — PROGRESS NOTE ADULT - SUBJECTIVE AND OBJECTIVE BOX
CC: Patient is unresponsive to tactile stimuli, on ventilator and pressor, NAD    Vital Signs Last 24 Hrs  T(C): 37.5 (12 Jan 2020 15:43), Max: 38.7 (12 Jan 2020 01:30)  T(F): 99.5 (12 Jan 2020 15:43), Max: 101.6 (12 Jan 2020 01:30)  HR: 90 (12 Jan 2020 18:00) (74 - 130)  BP: 94/60 (12 Jan 2020 18:00) (93/62 - 120/83)  BP(mean): 68 (12 Jan 2020 18:00) (66 - 92)  RR: 18 (12 Jan 2020 18:00) (10 - 31)  SpO2: 93% (12 Jan 2020 18:00) (89% - 100%)    01-11 @ 07:01 - 01-12 @ 07:00  --------------------------------------------------------  IN: 2274.5 mL / OUT: 1400 mL / NET: 874.5 mL    01-12 @ 07:01 - 01-12 @ 18:37  --------------------------------------------------------  IN: 1102.7 mL / OUT: 640 mL / NET: 462.7 mL    PHYSICAL EXAM:  Constitutional: well developed and nourished  HEENT: Sclera anicteric  Neck: No JVD  Respiratory: reduced air entry lower lungs with no rales, wheezing or rhonchi  Cardiovascular: S1 and S2 normally heard  Gastrointestinal: soft, nondistended, nontender and normal bowel sounds heard  Extremities: trace  peripheral edema present in both LEs  Neurological: sedated  Skin: Normal turgor    MEDICATIONS:  aspirin  chewable 81 milliGRAM(s) Oral daily  atorvastatin 40 milliGRAM(s) Oral at bedtime  chlorhexidine 0.12% Liquid 15 milliLiter(s) Oral Mucosa every 12 hours  chlorhexidine 4% Liquid 1 Application(s) Topical daily  dexMEDEtomidine Infusion 0.8 MICROgram(s)/kG/Hr IV Continuous <Continuous>  folic acid 1 milliGRAM(s) Oral daily  heparin  Injectable 5000 Unit(s) SubCutaneous every 12 hours  insulin lispro (HumaLOG) corrective regimen sliding scale   SubCutaneous every 6 hours  morphine  - Injectable 4 milliGRAM(s) IV Push every 4 hours PRN  multivitamin 1 Tablet(s) Oral daily  norepinephrine Infusion 0.13 MICROgram(s)/kG/Min IV Continuous <Continuous>  nystatin Powder 1 Application(s) Topical three times a day  pantoprazole   Suspension 40 milliGRAM(s) Enteral Tube daily  PHENobarbital Injectable 130 milliGRAM(s) IV Push every 6 hours PRN  QUEtiapine 25 milliGRAM(s) Oral every 12 hours  sodium chloride 0.9% lock flush 10 milliLiter(s) IV Push every 1 hour PRN  thiamine IVPB 500 milliGRAM(s) IV Intermittent daily    LABS:                        14.3   3.50  )-----------( 37       ( 12 Jan 2020 04:32 )             44.8     01-12    148<H>  |  111<H>  |  59<H>  ----------------------------<  153<H>  5.2   |  29  |  0.88    Ca    11.1<H>      12 Jan 2020 04:32  Phos  2.1     01-12  Mg     2.4     01-12    TPro  6.0  /  Alb  1.8<L>  /  TBili  5.8<H>  /  DBili  x   /  AST  228<H>  /  ALT  204<H>  /  AlkPhos  195<H>  01-12    ASSESSMENT AND PLAN:     REI: pre -renal azotemia resolving  -suggest to keep sbp>110 to avoid further renal injury   -Keep patient euvolemic and renal diet  -Avoid Nephrotoxic Meds/ Agents such as (NSAIDs, IV contrast, Aminoglycosides such as gentamicin, -Gadolinium contrast, Phosphate containing enemas, etc..)  -Adjust Medications according to eGFR  -f/u [facility] daily    HYPERCALCEMIA:   -suggest to get Vit D2/D3 level  ,ace level and Pth-related  protein  -f/u ca level daily  -suggest endocrine consult    EDEMA:multifactorial like chf/liver ds  -avoid iv fluid    HYPERNATREMIA: mild water deficit  -consider GT water supplementation  -follow BMP

## 2020-01-12 NOTE — PROGRESS NOTE ADULT - ASSESSMENT
70 y/o male with PMHx of afib (on Pradaxa), HTN and alcohol abuse presents to the ED with chief complaint of weakness and shortness of breath. Patient is initially admitted to medical floor for CHF exacerbation, afib with RVR, alcohol withdrawal and sepsis 2/2 cellulitis. Patient is AAOx2-3 at baseline, noted lethargic by nurse this morning. Patient is on CIWA protocol and received total 8 mg of ativan Iv push in last 24 hours. RRT was called for respiratory distress and lethargy, patient is minimally arousable to sternal rub, not able to follow commands.     Plan:     Neuro:   AAOx2-3 at baseline   Agitation getting better  Noted to be Altered mental status on admission likely from hypercarbia and medication induced (on ativan as per [facility])  pt on ativan and phenobarbital PRN  Intubated in ICU on 1/5     Respi:   Acute hypercapnic respiratory failure:   likely from combination of CHF and medication induced form ativan   FIO2 increased to 50% last night, was changed to 40%.   ABG - 1/9 - PH 7.36  Started on ETT on 1/5.     CVS:   # Afib:   history of Afib   rate controlled with metoprolol, on pradexa for anticoagulation   will hold pradexa for now   held Heparin drip from Thrombocytopenia.  ekg in am for qtc as seroquel was added    CHF:  admitted with CHF exacerbation   d/c lasix   stict I/O and daily weight   echo- EF - 55%    GI:   Tube fed  NG tube in place   Pt noted to be constipated, added on lactulose.  # Transaminitis:    /  on admission -stable   tbili 4.3 on admission  etiology unclear at this time  hepatitis panel - negative   CT abdomen shows now CBD dilatation, but does show hypodense lesions  USG abd- Contracted bladder  GI consulted - Dr. Martel.    Heme:  # Thrombocytopenia.    Platelets on admission 59 -> 55 -> 44->48->52->46  Blood smear 1/10- WNL.   slight elevation in D dimer 2200  f/u frey 13  f/u hit panel    Endo:   #DM:   HbA1c of 6.5  sliding scale q6 while NPO     ID:  # cellulitis:   Fever- 101F today   started on Vanco, zosyn  f/u VT, BLD CX  Sputum cx- GP cocci in pairs       Renal:   Creatine levels improved after IV bolus. REI resolving    urine lytes- WNL  Hypercalcemia+ f/u vit D, PTHrp    PPx:   was on pradexa - holding   protonix IV for GI  prophylaxis 70 y/o male with PMHx of afib (on Pradaxa), HTN and alcohol abuse presents to the ED with chief complaint of weakness and shortness of breath. Patient is initially admitted to medical floor for CHF exacerbation, afib with RVR, alcohol withdrawal and sepsis 2/2 cellulitis. Patient is AAOx2-3 at baseline, noted lethargic by nurse this morning. Patient is on CIWA protocol and received total 8 mg of ativan Iv push in last 24 hours. RRT was called for respiratory distress and lethargy, patient is minimally arousable to sternal rub, not able to follow commands.     Plan:     Neuro:   AAOx2-3 at baseline   Pt noted to be agitated, added Seroquel.   Noted to be Altered mental status on admission likely from hypercarbia and medication induced (on ativan as per CIWA)  pt on ativan and phenobarbital PRN    Intubated in ICU on 1/5     Respi:   Acute hypercapnic respiratory failure:   likely from combination of CHF and medication induced form ativan   FIO2 increased to 50% last night, was changed to 40%.   ABG - 1/9 - PH 7.36  Started on ETT on 1/5.     CVS:   # Afib:   history of Afib   rate controlled with metoprolol, on pradexa for anticoagulation   will hold pradexa for now   held Heparin drip from Thrombocytopenia.  ekg in am for qtc as seroquel was added    CHF:  admitted with CHF exacerbation   d/c lasix   stict I/O and daily weight   echo- EF - 55%    GI:   Tube fed  NG tube in place   # Transaminitis:    /  on admission -stable   tbili 4.3 on admission  etiology unclear at this time  hepatitis panel - negative   CT abdomen shows now CBD dilatation, but does show hypodense lesions  USG abd- Contracted bladder  GI consulted - Dr. Martel.    Heme:  # Thrombocytopenia.    Platelets on admission 59 -> 55 -> 44->48->52->46->34  Blood smear 1/10- WNL.   slight elevation in D dimer 2200  f/u frey 13  hit panel - negative    Endo:   #DM:   HbA1c of 6.5  sliding scale q6 while NPO     ID:  # cellulitis:   Fever- resolving   dc Vanco, zosyn (( asp pna)  bld cx- no growth   Sputum cx- GP cocci in pairs       Renal:   Creatine levels improved after IV bolus. REI resolving    urine lytes- WNL  Hypercalcemia+ f/u vit D, PTHrp    PPx:   was on pradexa - holding   protonix IV for GI  prophylaxis

## 2020-01-13 NOTE — PROGRESS NOTE ADULT - PROBLEM SELECTOR PLAN 4
Per family report, patient is retired, lives alone, ambulatory, + falls, independent of ADLs, + active smoker, ETOH dependence at baseline. Patient remains sedated/intubated and requiring pressor support; unweanable at this time. Family agreeable with trach/peg if needed. Prognosis guarded. Patient with multiple co-morbidities including G2DD, cirrhosis 2/2 ETOH abuse, + active smoker. Per family report, patient is retired, lives alone, ambulatory, + falls, independent of ADLs at baseline. Patient remains sedated/intubated and requiring pressor support; unweanable at this time. Family agreeable with trach/peg if needed. Prognosis guarded.

## 2020-01-13 NOTE — PROGRESS NOTE ADULT - PROBLEM SELECTOR PLAN 1
likely 2/2 cirrhosis, Alcohol use, Congestive hepatopathy and/ or possible mets to liver.  Please trend LFT's  Hep panel negative  Will need CT biopsy of liver lesions and MRI when stable.   Continue diuresis.   Start lactulose and Xifaxan if encephalopathic.

## 2020-01-13 NOTE — PROGRESS NOTE ADULT - SUBJECTIVE AND OBJECTIVE BOX
This note is incomplete  pt seen and examined.pts current chart reviewed and case discussed with resident covering.    SUBJECTIVE:  pt feels fine and denies cp,sob,gi or gu/uremic  symptons    REVIEW OF SYSTEMS:  CONSTITUTIONAL: No weakness, fevers or chills  EYES/ENT: No visual changes;  No vertigo or throat pain   NECK: No pain or stiffness  RESPIRATORY: No cough, wheezing, hemoptysis; No shortness of breath  CARDIOVASCULAR: No chest pain or palpitations  GASTROINTESTINAL: No abdominal or epigastric pain. No nausea, vomiting, or hematemesis; No diarrhea or constipation. No melena or hematochezia.  GENITOURINARY: No dysuria, frequency , hematuria, flank pain or nocturia  NEUROLOGICAL: No numbness or weakness  SKIN: No itching, burning, rashes, or lesions   All other review of systems is negative unless indicated above    Current meds:    aspirin  chewable 81 milliGRAM(s) Oral daily  atorvastatin 40 milliGRAM(s) Oral at bedtime  chlorhexidine 0.12% Liquid 15 milliLiter(s) Oral Mucosa every 12 hours  chlorhexidine 4% Liquid 1 Application(s) Topical daily  dexMEDEtomidine Infusion 0.6 MICROgram(s)/kG/Hr IV Continuous <Continuous>  folic acid 1 milliGRAM(s) Oral daily  heparin  Injectable 5000 Unit(s) SubCutaneous every 12 hours  insulin lispro (HumaLOG) corrective regimen sliding scale   SubCutaneous every 6 hours  morphine  - Injectable 4 milliGRAM(s) IV Push every 4 hours PRN  multivitamin 1 Tablet(s) Oral daily  norepinephrine Infusion 0.13 MICROgram(s)/kG/Min IV Continuous <Continuous>  nystatin Powder 1 Application(s) Topical three times a day  pantoprazole   Suspension 40 milliGRAM(s) Enteral Tube daily  QUEtiapine 25 milliGRAM(s) Oral every 12 hours  sodium chloride 0.45%. 1000 milliLiter(s) IV Continuous <Continuous>  sodium chloride 0.9% lock flush 10 milliLiter(s) IV Push every 1 hour PRN      Vital Signs    T(F): 97.5 (20 @ 11:15), Max: 99.7 (20 @ 07:15)  HR: 111 (20 @ 13:45) (82 - 139)  BP: 70/47 (20 @ 13:04) (70/47 - 130/82)  ABP: 82/59 (20 @ 13:45) (76/54 - 152/94)  RR: 14 (20 @ 13:45) (10 - 32)  SpO2: 97% (20 @ 13:45) (87% - 100%)  Wt(kg): --  CVP(cm H2O): --  CO: --  PCWP: --    I and O's:     @ 07:  -   @ 07:00  --------------------------------------------------------  IN:    dexmedetomidine Infusion: 600 mL    Enteral Tube Flush: 220 mL    Glucerna 1.5: 840 mL    norepinephrine Infusion: 264.5 mL    Solution: 100 mL    Solution: 250 mL  Total IN: 2274.5 mL    OUT:    Indwelling Catheter - Urethral: 1400 mL  Total OUT: 1400 mL    Total NET: 874.5 mL       @ 07: @ 07:00  --------------------------------------------------------  IN:    dexmedetomidine Infusion: 606.3 mL    Enteral Tube Flush: 625 mL    Glucerna 1.5: 805 mL    norepinephrine Infusion: 28.1 mL    norepinephrine Infusion: 64.4 mL    Solution: 100 mL  Total IN: 2228.7 mL    OUT:    Indwelling Catheter - Urethral: 1455 mL  Total OUT: 1455 mL    Total NET: 773.7 mL       @ 07: @ 14:29  --------------------------------------------------------  IN:    dexmedetomidine Infusion: 101.2 mL    dexmedetomidine Infusion: 31.2 mL    Enteral Tube Flush: 80 mL    Enteral Tube Flush: 400 mL    Glucerna 1.5: 245 mL    norepinephrine Infusion: 9.4 mL    norepinephrine Infusion: 3.5 mL    sodium chloride 0.45%.: 600 mL  Total IN: 1470.3 mL    OUT:    Indwelling Catheter - Urethral: 290 mL  Total OUT: 290 mL    Total NET: 1180.3 mL        Daily     Daily Weight in k (2020 07:15)    PHYSICAL EXAM:  Constitutional: well developed, well nourished  and in nad  HEENT: PERRLA,  no icteric sclera and mild pallor of conjunctiva noted  Neck: No JVD, thyromegaly or adenopathy  Respiratory: reduced air entry lower lungs with no rales, wheezing or rhonchi  Cardiovascular: S1 and S2 normally heard  Gastrointestinal: soft, nondistended, nontender and normal bowel sounds heard  Extremities: No peripheral edema or cyanosis  Neurological: A/O x 3, no focal deficits  : No flank or cva tenderness palpated.  Skin: No rashes      LABS:    CBC:                          13.4   2.37  )-----------( 31       ( 2020 11:21 )             43.4           BMP:        153<H>  |  116<H>  |  62<H>  ----------------------------<  151<H>  5.3   |  29  |  1.09      147<H>  |  113<H>  |  61<H>  ----------------------------<  170<H>  4.8   |  28  |  0.95      148<H>  |  111<H>  |  59<H>  ----------------------------<  153<H>  5.2   |  29  |  0.88      148<H>  |  108  |  57<H>  ----------------------------<  170<H>  4.6   |  31  |  1.01    Ca    11.5<H>      2020 05:21  Ca    11.2<H>      2020 00:22  Ca    11.1<H>      2020 04:32  Ca    11.0<H>      2020 06:35  Phos  3.0       Phos  2.1       Phos  2.1       Phos  2.0       Mg     2.6       Mg     2.4       Mg     2.4       Mg     2.5         TPro  5.9<L>  /  Alb  1.7<L>  /  TBili  5.5<H>  /  DBili  x   /  AST  245<H>  /  ALT  190<H>  /  AlkPhos  219<H>    TPro  6.0  /  Alb  1.8<L>  /  TBili  5.8<H>  /  DBili  x   /  AST  228<H>  /  ALT  204<H>  /  AlkPhos  195<H>    TPro  6.3  /  Alb  1.9<L>  /  TBili  6.5<H>  /  DBili  x   /  AST  222<H>  /  ALT  228<H>  /  AlkPhos  182<H>        Thyroid Stimulating Hormone, Serum: 1.25 uU/mL ( @ 11:20)      URINE STUDIES:        Eosinophil Count, Urine: Negative (01-10 @ 09:20)  Osmolality, Random Urine: 742 mos/kg ( @ 12:14)  Sodium, Random Urine: 46 mmol/L ( @ 12:14)  Creatinine, Random Urine: 88 mg/dL ( @ 12:14)  Chloride, Random Urine: <10 mmol/L ( @ 12:14)                  RADIOLOGY & ADDITIONAL STUDIES: pt seen and examined    SUBJECTIVE:  pt is intubated/sedated and in nad    REVIEW OF SYSTEMS:  Unobtainable  Current meds:    aspirin  chewable 81 milliGRAM(s) Oral daily  atorvastatin 40 milliGRAM(s) Oral at bedtime  chlorhexidine 0.12% Liquid 15 milliLiter(s) Oral Mucosa every 12 hours  chlorhexidine 4% Liquid 1 Application(s) Topical daily  dexMEDEtomidine Infusion 0.6 MICROgram(s)/kG/Hr IV Continuous <Continuous>  folic acid 1 milliGRAM(s) Oral daily  heparin  Injectable 5000 Unit(s) SubCutaneous every 12 hours  insulin lispro (HumaLOG) corrective regimen sliding scale   SubCutaneous every 6 hours  morphine  - Injectable 4 milliGRAM(s) IV Push every 4 hours PRN  multivitamin 1 Tablet(s) Oral daily  norepinephrine Infusion 0.13 MICROgram(s)/kG/Min IV Continuous <Continuous>  nystatin Powder 1 Application(s) Topical three times a day  pantoprazole   Suspension 40 milliGRAM(s) Enteral Tube daily  QUEtiapine 25 milliGRAM(s) Oral every 12 hours  sodium chloride 0.45%. 1000 milliLiter(s) IV Continuous <Continuous>  sodium chloride 0.9% lock flush 10 milliLiter(s) IV Push every 1 hour PRN      Vital Signs    T(F): 97.5 (20 @ 11:15), Max: 99.7 (20 @ 07:15)  HR: 111 (20 @ 13:45) (82 - 139)  BP: 70/47 (20 @ 13:04) (70/47 - 130/82)  ABP: 82/59 (20 @ 13:45) (76/54 - 152/94)  RR: 14 (20 @ 13:45) (10 - 32)  SpO2: 97% (20 @ 13:45) (87% - 100%)  Wt(kg): --  CVP(cm H2O): --  CO: --  PCWP: --    I and O's:     @ 07:01  -   @ 07:00  --------------------------------------------------------  IN:    dexmedetomidine Infusion: 600 mL    Enteral Tube Flush: 220 mL    Glucerna 1.5: 840 mL    norepinephrine Infusion: 264.5 mL    Solution: 100 mL    Solution: 250 mL  Total IN: 2274.5 mL    OUT:    Indwelling Catheter - Urethral: 1400 mL  Total OUT: 1400 mL    Total NET: 874.5 mL       @ 07: @ 07:00  --------------------------------------------------------  IN:    dexmedetomidine Infusion: 606.3 mL    Enteral Tube Flush: 625 mL    Glucerna 1.5: 805 mL    norepinephrine Infusion: 28.1 mL    norepinephrine Infusion: 64.4 mL    Solution: 100 mL  Total IN: 2228.7 mL    OUT:    Indwelling Catheter - Urethral: 1455 mL  Total OUT: 1455 mL    Total NET: 773.7 mL       @ 07: @ 14:29  --------------------------------------------------------  IN:    dexmedetomidine Infusion: 101.2 mL    dexmedetomidine Infusion: 31.2 mL    Enteral Tube Flush: 80 mL    Enteral Tube Flush: 400 mL    Glucerna 1.5: 245 mL    norepinephrine Infusion: 9.4 mL    norepinephrine Infusion: 3.5 mL    sodium chloride 0.45%.: 600 mL  Total IN: 1470.3 mL    OUT:    Indwelling Catheter - Urethral: 290 mL  Total OUT: 290 mL    Total NET: 1180.3 mL        Daily     Daily Weight in k (2020 07:15)    PHYSICAL EXAM:  Constitutional: well developed ,obese and in nad  HEENT: Sclera anicteric  Neck: No JVD  Respiratory: reduced air entry lower lungs with no rales, wheezing or rhonchi  Cardiovascular: S1 and S2 normally heard  Gastrointestinal: soft, nondistended, nontender and normal bowel sounds heard  Extremities: trace  peripheral edema present in both LEs  Neurological: sedated  Skin: Normal turgor    LABS:    CBC:                          13.4   2.37  )-----------( 31       ( 2020 11:21 )             43.4           BMP:        153<H>  |  116<H>  |  62<H>  ----------------------------<  151<H>  5.3   |  29  |  1.09      147<H>  |  113<H>  |  61<H>  ----------------------------<  170<H>  4.8   |  28  |  0.95      148<H>  |  111<H>  |  59<H>  ----------------------------<  153<H>  5.2   |  29  |  0.88      148<H>  |  108  |  57<H>  ----------------------------<  170<H>  4.6   |  31  |  1.01    Ca    11.5<H>      2020 05:21  Ca    11.2<H>      2020 00:22  Ca    11.1<H>      2020 04:32  Ca    11.0<H>      2020 06:35  Phos  3.0       Phos  2.1       Phos  2.1       Phos  2.0       Mg     2.6       Mg     2.4       Mg     2.4       Mg     2.5         TPro  5.9<L>  /  Alb  1.7<L>  /  TBili  5.5<H>  /  DBili  x   /  AST  245<H>  /  ALT  190<H>  /  AlkPhos  219<H>    TPro  6.0  /  Alb  1.8<L>  /  TBili  5.8<H>  /  DBili  x   /  AST  228<H>  /  ALT  204<H>  /  AlkPhos  195<H>    TPro  6.3  /  Alb  1.9<L>  /  TBili  6.5<H>  /  DBili  x   /  AST  222<H>  /  ALT  228<H>  /  AlkPhos  182<H>        Thyroid Stimulating Hormone, Serum: 1.25 uU/mL ( @ 11:20)                      RADIOLOGY & ADDITIONAL STUDIES:    < from: Xray Chest 1 View- PORTABLE-Routine (20 @ 06:53) >  EXAM:  XR CHEST PORTABLE ROUTINE 1V                            PROCEDURE DATE:  2020          INTERPRETATION:  Chest portable.    Clinical History: Status post intubation. COPD.    Comparison: 2020.    Single AP view submitted.  Patient's lines and tubes remain unchanged in position.    The evaluation of the cardiomediastinal silhouette is limited on portable technique.    There is patchy right lower lobe airspace consolidation which may reflect atelectasis and/or pneumonia in the appropriate clinical setting.    The left lung apex is not entirely included on this exam.    Impression:    Findings as discussed above.    < end of copied text >

## 2020-01-13 NOTE — PROGRESS NOTE ADULT - SUBJECTIVE AND OBJECTIVE BOX
Time of Visit:  Patient seen and examined.  intubated and + encephalopathy    MEDICATIONS  (STANDING):  aspirin  chewable 81 milliGRAM(s) Oral daily  atorvastatin 40 milliGRAM(s) Oral at bedtime  chlorhexidine 0.12% Liquid 15 milliLiter(s) Oral Mucosa every 12 hours  chlorhexidine 4% Liquid 1 Application(s) Topical daily  dexMEDEtomidine Infusion 0.6 MICROgram(s)/kG/Hr (18.75 mL/Hr) IV Continuous <Continuous>  folic acid 1 milliGRAM(s) Oral daily  heparin  Injectable 5000 Unit(s) SubCutaneous every 12 hours  insulin lispro (HumaLOG) corrective regimen sliding scale   SubCutaneous every 6 hours  multivitamin 1 Tablet(s) Oral daily  norepinephrine Infusion 0.13 MICROgram(s)/kG/Min (15.234 mL/Hr) IV Continuous <Continuous>  nystatin Powder 1 Application(s) Topical three times a day  pantoprazole   Suspension 40 milliGRAM(s) Enteral Tube daily  QUEtiapine 25 milliGRAM(s) Oral every 12 hours  sodium chloride 0.45%. 1000 milliLiter(s) (100 mL/Hr) IV Continuous <Continuous>      MEDICATIONS  (PRN):  morphine  - Injectable 4 milliGRAM(s) IV Push every 4 hours PRN respiratory distress  sodium chloride 0.9% lock flush 10 milliLiter(s) IV Push every 1 hour PRN Pre/post blood products, medications, blood draw, and to maintain line patency       Medications up to date at time of exam.      PHYSICAL EXAMINATION:  Patient has no new complaints.  GENERAL: The patient is a well-developed, well-nourished, in no apparent distress.     Vital Signs Last 24 Hrs  T(C): 36.4 (13 Jan 2020 11:15), Max: 37.6 (13 Jan 2020 07:15)  T(F): 97.5 (13 Jan 2020 11:15), Max: 99.7 (13 Jan 2020 07:15)  HR: 111 (13 Jan 2020 13:45) (82 - 139)  BP: 70/47 (13 Jan 2020 13:04) (70/47 - 130/82)  BP(mean): 53 (13 Jan 2020 13:04) (53 - 94)  RR: 14 (13 Jan 2020 13:45) (10 - 32)  SpO2: 97% (13 Jan 2020 13:45) (87% - 100%)  Mode: AC/ CMV (Assist Control/ Continuous Mandatory Ventilation)  RR (machine): 14  TV (machine): 500  FiO2: 40  PEEP: 5  ITime: 1  MAP: 11  PIP: 31   (if applicable)    Chest Tube (if applicable)    HEENT: Head is normocephalic and atraumatic. Extraocular muscles are intact. Mucous membranes are moist. + ett    NECK: Supple, no palpable adenopathy.    LUNGS: Clear to auscultation, no wheezing, rales, or rhonchi.    HEART: Regular rate and rhythm without murmur.    ABDOMEN: Soft, nontender, and nondistended.  No hepatosplenomegaly is noted.    : No painful voiding, no pelvic pain    EXTREMITIES: Without any cyanosis, clubbing, rash, lesions or edema.    NEUROLOGIC: eyes open, do not track or follow commands    SKIN: Warm, dry, good turgor.      LABS:                        13.4   2.37  )-----------( 31       ( 13 Jan 2020 11:21 )             43.4     01-13    153<H>  |  116<H>  |  62<H>  ----------------------------<  151<H>  5.3   |  29  |  1.09    Ca    11.5<H>      13 Jan 2020 05:21  Phos  3.0     01-13  Mg     2.6     01-13    TPro  5.9<L>  /  Alb  1.7<L>  /  TBili  5.5<H>  /  DBili  x   /  AST  245<H>  /  ALT  190<H>  /  AlkPhos  219<H>  01-13        ABG - ( 13 Jan 2020 03:59 )  pH, Arterial: 7.38  pH, Blood: x     /  pCO2: 51    /  pO2: 75    / HCO3: 29    / Base Excess: 3.4   /  SaO2: 93                CARDIAC MARKERS ( 13 Jan 2020 11:20 )  0.093 ng/mL / x     / 344 U/L / x     / 2.7 ng/mL  CARDIAC MARKERS ( 13 Jan 2020 05:21 )  0.089 ng/mL / x     / 334 U/L / x     / 2.4 ng/mL  CARDIAC MARKERS ( 13 Jan 2020 00:22 )  0.084 ng/mL / x     / 186 U/L / x     / 1.4 ng/mL                MICROBIOLOGY: (if applicable)    RADIOLOGY & ADDITIONAL STUDIES:  EKG:   CXR:  < from: Xray Chest 1 View- PORTABLE-Routine (01.13.20 @ 06:53) >    EXAM:  XR CHEST PORTABLE ROUTINE 1V                            PROCEDURE DATE:  01/13/2020          INTERPRETATION:  Chest portable.    Clinical History: Status post intubation. COPD.    Comparison: 1/12/2020.    Single AP view submitted.  Patient's lines and tubes remain unchanged in position.    The evaluation of the cardiomediastinal silhouette is limited on portable technique.    There is patchy right lower lobe airspace consolidation which may reflect atelectasis and/or pneumonia in the appropriate clinical setting.    The left lung apex is not entirely included on this exam.    Impression:    Findings as discussed above.                      CHERI SOLOMON M.D., ATTENDING RADIOLOGIST  This document has been electronically signed. Jan 13 2020 12:18PM                < end of copied text >  ECHO:    IMPRESSION: 69y Male PAST MEDICAL & SURGICAL HISTORY:  HTN (hypertension)  Atrial fibrillation  No significant past surgical history   p/w           IMP: This is a 69 yr old man with  afib (on Pradaxa), HTN and alcohol abuse presents to the ED with chief complaint of weakness and shortness of breath. Patient is initially admitted to medical floor for CHF exacerbation, afib with RVR, alcohol withdrawal and sepsis 2/2 cellulitis. Patient is AAOx2-3 at baseline, noted lethargic by nurse this morning. Patient is on CIWA protocol and received total 8 mg of ativan Iv push in last 24 hours. RRT was called for respiratory distress and lethargy, patient is minimally arousable to sternal rub, not able to follow commands.  + ASP PNA on antibx. . intubated 1/5. Pat is spiking high fevers, neg cultures and neg cxr... will hold antibx . Possible drug related fevers.    Pat requires multiple meds for sedation, will add seroquel after EKG    Pat completed course of antibx. + encephaoloathy and SBT. Pat with prolong intubation. failing daily sbt .      Sugg  -will benefit from trach  -continue daily sbt  -duonebs  -hemodynamic support  -continue care as per icu team    case dismissed with icu team

## 2020-01-13 NOTE — PROGRESS NOTE ADULT - ATTENDING COMMENTS
Patient seen and examined with resident, Addendum to above.    69 yr old man with afib (on Pradaxa), HTN and alcohol abuse presents to the ED with chief complaint of weakness and shortness of breath. Patient is initially admitted to medical floor for CHF exacerbation, afib with RVR, alcohol withdrawal. RRT was called for respiratory distress and lethargy, patient was minimally arousable to sternal rub, not able to follow commands.  + ASP PNA on antibx. . intubated 1/5.     Assessment;  1. Acute respiratory failure  2. Alcohol withdrawal syndrome   3. Atrial fibrillation   4. Heart failure with preserved EF   5. Thrombocytopenia   6. Cirrhosis   7. REI   8. Hypercalcemia    - Cont. mechanical ventilation  - Daily awakening and weaning trials   - Monitor for signs of withdrawal  - Taper down precedex   - Thiamine and Folate supplementation   - Cont. tube feeds   - Completed antibiotics, cultures are all negative  - Monitor for signs of bleeding, holding heparin given thrombocytopenia   - Monitor urine output  - Persistent hypercalcemia, and now with hypernatremia  - Genlte IV hydration  - Free water supplementation  - Work up for secondary hypercalcemia   - Avoid lasix given hypernatremia  - Nephrology follow up   - May need calcitonin  - Overall prognosis is guarded at this time, given prolonged intubation may need tracheostomy  - Palliative care consult   - DVT prophylaxis with SCD, and stress ulcer prophylaxis

## 2020-01-13 NOTE — PROGRESS NOTE ADULT - SUBJECTIVE AND OBJECTIVE BOX
OVERNIGHT EVENTS: Elevated Na; started on IVF. Family discussing trach.     Present Symptoms:         Review of Systems: Unable to obtain- patient sedated/intubated; minimally responsive to tactile stimuli    MEDICATIONS  (STANDING):  aspirin  chewable 81 milliGRAM(s) Oral daily  atorvastatin 40 milliGRAM(s) Oral at bedtime  chlorhexidine 0.12% Liquid 15 milliLiter(s) Oral Mucosa every 12 hours  chlorhexidine 4% Liquid 1 Application(s) Topical daily  dexMEDEtomidine Infusion 0.6 MICROgram(s)/kG/Hr (18.75 mL/Hr) IV Continuous <Continuous>  folic acid 1 milliGRAM(s) Oral daily  heparin  Injectable 5000 Unit(s) SubCutaneous every 12 hours  insulin lispro (HumaLOG) corrective regimen sliding scale   SubCutaneous every 6 hours  multivitamin 1 Tablet(s) Oral daily  norepinephrine Infusion 0.13 MICROgram(s)/kG/Min (15.234 mL/Hr) IV Continuous <Continuous>  nystatin Powder 1 Application(s) Topical three times a day  pantoprazole   Suspension 40 milliGRAM(s) Enteral Tube daily  QUEtiapine 25 milliGRAM(s) Oral every 12 hours  sodium chloride 0.45%. 1000 milliLiter(s) (100 mL/Hr) IV Continuous <Continuous>    MEDICATIONS  (PRN):  morphine  - Injectable 4 milliGRAM(s) IV Push every 4 hours PRN respiratory distress  sodium chloride 0.9% lock flush 10 milliLiter(s) IV Push every 1 hour PRN Pre/post blood products, medications, blood draw, and to maintain line patency    PHYSICAL EXAM:  Vital Signs Last 24 Hrs  T(C): 36.4 (13 Jan 2020 11:15), Max: 37.6 (13 Jan 2020 07:15)  T(F): 97.5 (13 Jan 2020 11:15), Max: 99.7 (13 Jan 2020 07:15)  HR: 111 (13 Jan 2020 13:45) (82 - 139)  BP: 70/47 (13 Jan 2020 13:04) (70/47 - 130/82)  BP(mean): 53 (13 Jan 2020 13:04) (53 - 94)  RR: 14 (13 Jan 2020 13:45) (10 - 32)  SpO2: 97% (13 Jan 2020 13:45) (87% - 100%)  General: patient sedated/intubated; minimally responsive to tactile stimuli, unable to follow commands, + agitation. No distress.   Karnofsky Performance Score/Palliative Performance Status Version2:     20%    HEENT:  dry mouth  ET tube   Lungs: Comfortable, on full vent support. On precedex.  CV: S1S2, IRR. tachycardia. On pressor  GI: soft, NTND, incontinent  : mansfield cath  Musculoskeletal: ambulatory at baseline; patient now sedated/intubated; minimally responsive to pain, unable to follow commands, dependent on all ADLs  Skin: warm, +BLE edema  Neuro: patient sedated/intubated; minimally responsive to tactile stimuli, unable to follow commands  Oral intake ability: unable/only mouth care    Diet: NPO; TF via NG tube        LABS:                          13.4   2.37  )-----------( 31       ( 13 Jan 2020 11:21 )             43.4     01-13    153<H>  |  116<H>  |  62<H>  ----------------------------<  151<H>  5.3   |  29  |  1.09    Ca    11.5<H>      13 Jan 2020 05:21  Phos  3.0     01-13  Mg     2.6     01-13    TPro  5.9<L>  /  Alb  1.7<L>  /  TBili  5.5<H>  /  DBili  x   /  AST  245<H>  /  ALT  190<H>  /  AlkPhos  219<H>  01-13    Albumin: 1.7    RADIOLOGY & ADDITIONAL STUDIES:  < from: Xray Chest 1 View- PORTABLE-Routine (01.13.20 @ 06:53) >  EXAM:  XR CHEST PORTABLE ROUTINE 1V                        PROCEDURE DATE:  01/13/2020    INTERPRETATION:  Chest portable.  Clinical History: Status post intubation. COPD.  Comparison: 1/12/2020.  Single AP view submitted.  Patient's lines and tubes remain unchanged in position.  The evaluation of the cardiomediastinal silhouette is limited on portable technique.  There is patchy right lower lobe airspace consolidation which may reflect atelectasis and/or pneumonia in the appropriate clinical setting.  The left lung apex is not entirely included on this exam.  Impression:  Findings as discussed above.  < end of copied text >      ADVANCE DIRECTIVES: MOLST completed as per surrogate's wishes: CPR / long term ventilation if needed / send to hospital / long term feeding tube if needed / trial IV fluids / use abx.

## 2020-01-13 NOTE — PROGRESS NOTE ADULT - SUBJECTIVE AND OBJECTIVE BOX
GI PROGRESS NOTE    Patient is a 69y old  Male who presents with a chief complaint of sob (12 Jan 2020 11:40)      HPI:  68 y/o male with PMHx of afib (on Pradaxa), HTN and alcohol abuse presents to the ED with chief complaint of weakness and shortness of breath. Patient is AAOx2 and a vague historian. He reports having multiple falls in the past 2 weeks. He thinks he has experienced LOC sometimes as well. He endorses left lower chest pain, described as worse with movement and pleuritic in nature. He reports lower extremitiy edema over the past few months. He also complains of left lower abdominal pain. He denies nausea or vomiting. He reports currently drinking 1 bottle of wine daily with a shot of scotch. He lives alone and says he ambulates independently.     ED course:  EKG shows afib with RVR  RLE noted to be cool to touch compared to left - bedside doppler confirmed pulses of both LE. (04 Jan 2020 00:23)          ______________________________________________________________________  PMH/PSH:  PAST MEDICAL & SURGICAL HISTORY:  HTN (hypertension)  Atrial fibrillation  No significant past surgical history    ______________________________________________________________________  MEDS:  MEDICATIONS  (STANDING):  aspirin  chewable 81 milliGRAM(s) Oral daily  atorvastatin 40 milliGRAM(s) Oral at bedtime  chlorhexidine 0.12% Liquid 15 milliLiter(s) Oral Mucosa every 12 hours  chlorhexidine 4% Liquid 1 Application(s) Topical daily  dexMEDEtomidine Infusion 0.6 MICROgram(s)/kG/Hr (18.75 mL/Hr) IV Continuous <Continuous>  folic acid 1 milliGRAM(s) Oral daily  heparin  Injectable 5000 Unit(s) SubCutaneous every 12 hours  insulin lispro (HumaLOG) corrective regimen sliding scale   SubCutaneous every 6 hours  multivitamin 1 Tablet(s) Oral daily  norepinephrine Infusion 0.13 MICROgram(s)/kG/Min (15.234 mL/Hr) IV Continuous <Continuous>  nystatin Powder 1 Application(s) Topical three times a day  pantoprazole   Suspension 40 milliGRAM(s) Enteral Tube daily  QUEtiapine 25 milliGRAM(s) Oral every 12 hours  sodium chloride 0.45%. 1000 milliLiter(s) (100 mL/Hr) IV Continuous <Continuous>    MEDICATIONS  (PRN):  morphine  - Injectable 4 milliGRAM(s) IV Push every 4 hours PRN respiratory distress  sodium chloride 0.9% lock flush 10 milliLiter(s) IV Push every 1 hour PRN Pre/post blood products, medications, blood draw, and to maintain line patency    ______________________________________________________________________  ALL:   Allergies    No Known Allergies    Intolerances      ______________________________________________________________________  SH: Social History:  Former smoker - 1ppd for 40 years; quit at age 57; drinks heavily - 1 bottle of wine daily; lives alone (04 Jan 2020 00:23)    ______________________________________________________________________  FH:  FAMILY HISTORY:  No pertinent family history in first degree relatives    ______________________________________________________________________  ROS:    CONSTITUTIONAL:  No weight loss, fever, chills, weakness or fatigue.    HEENT:  Eyes:  No visual loss, blurred vision, double vision or yellow sclerae. Ears, Nose, Throat:  No hearing loss, sneezing, congestion, runny nose or sore throat.    SKIN:  No rash or itching.    CARDIOVASCULAR:  No chest pain, chest pressure or chest discomfort. No palpitations or edema.    RESPIRATORY:  No shortness of breath, cough or sputum.    GASTROINTESTINAL:  SEE HPI    GENITOURINARY:  No dysuria, hematuria, urinary frequency    NEUROLOGICAL:  No headache, dizziness, syncope, paralysis, ataxia, numbness or tingling in the extremities. No change in bowel or bladder control.    MUSCULOSKELETAL:  No muscle, back pain, joint pain or stiffness.    HEMATOLOGIC:  No anemia, bleeding or bruising.    LYMPHATICS:  No enlarged nodes. No history of splenectomy.    PSYCHIATRIC:  No history of depression or anxiety.    ENDOCRINOLOGIC:  No reports of sweating, cold or heat intolerance. No polyuria or polydipsia.    ALLERGIES:  No history of asthma, hives, eczema or rhinitis.  ______________________________________________________________________  PHYSICAL EXAM:  T(C): 36.4 (01-13-20 @ 11:15), Max: 37.6 (01-13-20 @ 07:15)  HR: 122 (01-13-20 @ 12:21)  BP: 109/59 (01-13-20 @ 12:00)  RR: 15 (01-13-20 @ 12:21)  SpO2: 95% (01-13-20 @ 12:21)  Wt(kg): --    01-12 - 01-13  --------------------------------------------------------  IN:    dexmedetomidine Infusion: 606.3 mL    Enteral Tube Flush: 625 mL    Glucerna 1.5: 805 mL    norepinephrine Infusion: 28.1 mL    norepinephrine Infusion: 64.4 mL    Solution: 100 mL  Total IN: 2228.7 mL    OUT:    Indwelling Catheter - Urethral: 1455 mL  Total OUT: 1455 mL    Total NET: 773.7 mL      01-13 - 01-13  --------------------------------------------------------  IN:    dexmedetomidine Infusion: 101.2 mL    dexmedetomidine Infusion: 15.6 mL    Enteral Tube Flush: 80 mL    Enteral Tube Flush: 300 mL    Glucerna 1.5: 210 mL    norepinephrine Infusion: 9.4 mL    sodium chloride 0.45%.: 500 mL  Total IN: 1216.2 mL    OUT:    Indwelling Catheter - Urethral: 270 mL  Total OUT: 270 mL    Total NET: 946.2 mL          GEN: agitated  CVS- S1 S2  ABD: soft nontender, non distended, bowel sounds+  LUNGS: intubated, vent  NEURO: does not follow commands  Extremities: no cyanosis, no calf tenderness, no edema, no clubbing      ______________________________________________________________________  LABS:                        13.4   2.37  )-----------( 31       ( 13 Jan 2020 11:21 )             43.4     01-13    153<H>  |  116<H>  |  62<H>  ----------------------------<  151<H>  5.3   |  29  |  1.09    Ca    11.5<H>      13 Jan 2020 05:21  Phos  3.0     01-13  Mg     2.6     01-13    TPro  5.9<L>  /  Alb  1.7<L>  /  TBili  5.5<H>  /  DBili  x   /  AST  245<H>  /  ALT  190<H>  /  AlkPhos  219<H>  01-13    LIVER FUNCTIONS - ( 13 Jan 2020 05:21 )  Alb: 1.7 g/dL / Pro: 5.9 g/dL / ALK PHOS: 219 U/L / ALT: 190 U/L DA / AST: 245 U/L / GGT: x           ______________________________________________________________________

## 2020-01-13 NOTE — PROGRESS NOTE ADULT - PROBLEM SELECTOR PLAN 5
Spoke with patient's sister/Juliann ([phone] / [phone]) on the phone. Discussed status; risks vs benefits of resuscitation, trach and PEG. Offered to schedule family meeting, but she reports that after speaking with her siblings, they are all in agreement to proceed with trach and PEG if needed to give him "a chance." Reviewed MOLST and completed as per family's wishes: CPR / long term ventilation if needed / send to hospital / long term feeding tube if needed / trial IV fluids / use abx. Aware that pending status, patient may need placement.  All questions answered; supportive counseling provided.

## 2020-01-13 NOTE — PROGRESS NOTE ADULT - SUBJECTIVE AND OBJECTIVE BOX
INTERVAL HPI/OVERNIGHT EVENTS: Pt sodium levels were noted to be elevated likey due to free water deficit. Started on 0.45NS.  Work up for Hypercalcemia in progress. Spoke to sister Mrs. Humphreys about possibilities of Tracheostomy. She needs more time to discuss with other family members.     PRESSORS: [ x] YES [ ] NO  WHICH:    ANTIBIOTICS:                  DATE STARTED:  ANTIBIOTICS:                  DATE STARTED:  ANTIBIOTICS:                  DATE STARTED:    Antimicrobial:    Cardiovascular:  norepinephrine Infusion 0.13 MICROgram(s)/kG/Min IV Continuous <Continuous>    Pulmonary:    Hematalogic:  aspirin  chewable 81 milliGRAM(s) Oral daily  heparin  Injectable 5000 Unit(s) SubCutaneous every 12 hours    Other:  atorvastatin 40 milliGRAM(s) Oral at bedtime  chlorhexidine 0.12% Liquid 15 milliLiter(s) Oral Mucosa every 12 hours  chlorhexidine 4% Liquid 1 Application(s) Topical daily  dexMEDEtomidine Infusion 0.6 MICROgram(s)/kG/Hr IV Continuous <Continuous>  folic acid 1 milliGRAM(s) Oral daily  insulin lispro (HumaLOG) corrective regimen sliding scale   SubCutaneous every 6 hours  morphine  - Injectable 4 milliGRAM(s) IV Push every 4 hours PRN  multivitamin 1 Tablet(s) Oral daily  nystatin Powder 1 Application(s) Topical three times a day  pantoprazole   Suspension 40 milliGRAM(s) Enteral Tube daily  QUEtiapine 25 milliGRAM(s) Oral every 12 hours  sodium chloride 0.45%. 1000 milliLiter(s) IV Continuous <Continuous>  sodium chloride 0.9% lock flush 10 milliLiter(s) IV Push every 1 hour PRN    aspirin  chewable 81 milliGRAM(s) Oral daily  atorvastatin 40 milliGRAM(s) Oral at bedtime  chlorhexidine 0.12% Liquid 15 milliLiter(s) Oral Mucosa every 12 hours  chlorhexidine 4% Liquid 1 Application(s) Topical daily  dexMEDEtomidine Infusion 0.6 MICROgram(s)/kG/Hr IV Continuous <Continuous>  folic acid 1 milliGRAM(s) Oral daily  heparin  Injectable 5000 Unit(s) SubCutaneous every 12 hours  insulin lispro (HumaLOG) corrective regimen sliding scale   SubCutaneous every 6 hours  morphine  - Injectable 4 milliGRAM(s) IV Push every 4 hours PRN  multivitamin 1 Tablet(s) Oral daily  norepinephrine Infusion 0.13 MICROgram(s)/kG/Min IV Continuous <Continuous>  nystatin Powder 1 Application(s) Topical three times a day  pantoprazole   Suspension 40 milliGRAM(s) Enteral Tube daily  QUEtiapine 25 milliGRAM(s) Oral every 12 hours  sodium chloride 0.45%. 1000 milliLiter(s) IV Continuous <Continuous>  sodium chloride 0.9% lock flush 10 milliLiter(s) IV Push every 1 hour PRN    Drug Dosing Weight  Height (cm): 180.34 (03 Jan 2020 15:39)  Weight (kg): 125 (05 Jan 2020 11:30)  BMI (kg/m2): 38.4 (05 Jan 2020 11:30)  BSA (m2): 2.42 (05 Jan 2020 11:30)    CENTRAL LINE: [ ] YES [ ] NO  LOCATION:         VILLATORO: [ ] YES [ ] NO          A-LINE:  [ ] YES [ ] NO  LOCATION:             ICU Vital Signs Last 24 Hrs  T(C): 36.4 (13 Jan 2020 11:15), Max: 37.6 (13 Jan 2020 07:15)  T(F): 97.5 (13 Jan 2020 11:15), Max: 99.7 (13 Jan 2020 07:15)  HR: 111 (13 Jan 2020 13:45) (82 - 139)  BP: 70/47 (13 Jan 2020 13:04) (70/47 - 130/82)  BP(mean): 53 (13 Jan 2020 13:04) (53 - 94)  ABP: 82/59 (13 Jan 2020 13:45) (76/54 - 152/94)  ABP(mean): 67 (13 Jan 2020 13:45) (61 - 115)  RR: 14 (13 Jan 2020 13:45) (10 - 32)  SpO2: 97% (13 Jan 2020 13:45) (87% - 100%)      ABG - ( 13 Jan 2020 03:59 )  pH, Arterial: 7.38  pH, Blood: x     /  pCO2: 51    /  pO2: 75    / HCO3: 29    / Base Excess: 3.4   /  SaO2: 93                    01-12 @ 07:01  -  01-13 @ 07:00  --------------------------------------------------------  IN: 2228.7 mL / OUT: 1455 mL / NET: 773.7 mL        Mode: AC/ CMV (Assist Control/ Continuous Mandatory Ventilation)  RR (machine): 14  TV (machine): 500  FiO2: 40  PEEP: 5  ITime: 1  MAP: 11  PIP: 31      REVIEW OF SYSTEMS:    could not be performed as pt is sedated for Intubation    PHYSICAL EXAM:    GENERAL: NAD, Obese  EYES: EOMI, PERRLA,   NECK: Supple, No JVD; Normal thyroid; RSC in place  NERVOUS SYSTEM:  sedated for intubation  CHEST/LUNG: No rales, rhonchi, wheezing   HEART: Regular rate and rhythm; No murmurs,   ABDOMEN: Soft, Nontender, Nondistended; Bowel sounds present  EXTREMITIES:  2+ Peripheral Pulses, No clubbing, cyanosis, or edema        LABS:  CBC Full  -  ( 13 Jan 2020 11:21 )  WBC Count : 2.37 K/uL  RBC Count : 4.18 M/uL  Hemoglobin : 13.4 g/dL  Hematocrit : 43.4 %  Platelet Count - Automated : 31 K/uL  Mean Cell Volume : 103.8 fl  Mean Cell Hemoglobin : 32.1 pg  Mean Cell Hemoglobin Concentration : 30.9 gm/dL  Auto Neutrophil # : 1.52 K/uL  Auto Lymphocyte # : 0.76 K/uL  Auto Monocyte # : 0.09 K/uL  Auto Eosinophil # : 0.00 K/uL  Auto Basophil # : 0.00 K/uL  Auto Neutrophil % : 55.0 %  Auto Lymphocyte % : 32.0 %  Auto Monocyte % : 4.0 %  Auto Eosinophil % : 0.0 %  Auto Basophil % : 0.0 %    01-13    153<H>  |  116<H>  |  62<H>  ----------------------------<  151<H>  5.3   |  29  |  1.09    Ca    11.5<H>      13 Jan 2020 05:21  Phos  3.0     01-13  Mg     2.6     01-13    TPro  5.9<L>  /  Alb  1.7<L>  /  TBili  5.5<H>  /  DBili  x   /  AST  245<H>  /  ALT  190<H>  /  AlkPhos  219<H>  01-13            RADIOLOGY & ADDITIONAL STUDIES REVIEWED:      [ ]GOALS OF CARE DISCUSSION WITH PATIENT/FAMILY/PROXY:    CRITICAL CARE TIME SPENT: 35 minutes

## 2020-01-13 NOTE — PROGRESS NOTE ADULT - PROBLEM SELECTOR PLAN 2
2/2 aspiration. Per CXY, patient with patchy right lower lobe airspace consolidation. Patient remains sedated/intubated; on pressor support. Full code.

## 2020-01-13 NOTE — PROGRESS NOTE ADULT - PROBLEM SELECTOR PLAN 1
2/2 aspiration. Patient remains sedated/intubated; on pressor support; morphine available PRN. Patient unweanable at this time. Spoke with patient's surrogate/sister, Juliann - she reports after speaking with her other siblings, they are agreeable for trach/PEG if needed. MOLST completed: FULL code, long term vent, long term feeding tube. 2/2 aspiration; comorbid G2DD, active smoker. Patient remains sedated/intubated; on pressor support; morphine available PRN. Patient unweanable at this time. Spoke with patient's surrogate/sister, Juliann - she reports after speaking with her other siblings, they are agreeable for trach/PEG if needed. MOLST completed: FULL code, long term vent, long term feeding tube.

## 2020-01-13 NOTE — PROGRESS NOTE ADULT - ASSESSMENT
REI: pre -renal azotemia resolving  -suggest to keep sbp>110 to avoid further renal injury   -Keep patient euvolemic and renal diet  -Avoid Nephrotoxic Meds/ Agents such as (NSAIDs, IV contrast, Aminoglycosides such as gentamicin, -Gadolinium contrast, Phosphate containing enemas, etc..)  -Adjust Medications according to eGFR  -f/u bmp daily    HYPERCALCEMIA:   -suggest to get Vit D2/D3 level  ,ace level and Pth-related  protein  -f/u ca level daily  -suggest endocrine consult    EDEMA:multifactorial like chf/liver ds  -avoid iv fluid    HYPERNATREMIA: mild water deficit  -consider GT water supplementation  -follow BMP A/P:  REI: pre -renal azotemia ,unchanged last 24 hrs  -suggest to keep sbp>110 to avoid further renal injury   -Keep patient euvolemic and renal diet  -Avoid Nephrotoxic Meds/ Agents such as (NSAIDs, IV contrast, Aminoglycosides such as gentamicin, -Gadolinium contrast, Phosphate containing enemas, etc..)  -Adjust Medications according to eGFR  -f/u bmp daily    HYPERCALCEMIA: worsening  -may add Calcitonin to control hypercalcemia if no ci as per micu team  -may add Prednisone if no ci   -suggest to get Vit D2/D3 level  ,ace level and Pth-related  protein  -f/u ca level daily  -suggest endocrine consult    EDEMA:multifactorial like chf/liver ds, mild  -avoid iv fluid    HYPERNATREMIA: secondary to  water deficit, worsening last 24 hrs  -add free water via  GT   -follow BMP daily A/P:  REI: pre -renal azotemia ,unchanged last 24 hrs  -suggest to keep sbp>110 to avoid further renal injury   -Keep patient euvolemic and renal diet  -Avoid Nephrotoxic Meds/ Agents such as (NSAIDs, IV contrast, Aminoglycosides such as gentamicin, -Gadolinium contrast, Phosphate containing enemas, etc..)  -Adjust Medications according to eGFR  -f/u bmp daily    HYPERCALCEMIA: worsening  -may add Calcitonin to control hypercalcemia if no ci as per micu team  -may add Prednisone if no ci   -suggest to get Vit D2/D3 level  ,ace level and Pth-related  protein  -f/u ca level daily  -suggest endocrine consult    EDEMA:multifactorial like chf/liver ds, mild  -avoid iv fluid    HYPERNATREMIA: secondary to  water deficit, worsening last 24 hrs  -add free water via  GT   -follow BMP daily  HYPERKALEMIA; add Lokelma prn for k>5.3

## 2020-01-13 NOTE — PROGRESS NOTE ADULT - ASSESSMENT
68 y/o male with PMHx of afib (on Pradaxa), HTN and alcohol abuse presents to the ED with chief complaint of weakness and shortness of breath. Patient is initially admitted to medical floor for CHF exacerbation, afib with RVR, alcohol withdrawal and sepsis 2/2 cellulitis. Patient is AAOx2-3 at baseline, noted lethargic by nurse this morning. Patient is on CIWA protocol and received total 8 mg of ativan Iv push in last 24 hours. RRT was called for respiratory distress and lethargy, patient is minimally arousable to sternal rub, not able to follow commands.     Plan:     Neuro:   AAOx2-3 at baseline   Pt noted to be agitated, added Seroquel.   Noted to be Altered mental status on admission likely from hypercarbia and medication induced (on ativan as per CIWA)  on  phenobarbital PRN  Intubated in ICU on 1/5. Spoken to family about Tracheostomy tube. Family needs time to decide    Respi:   Acute hypercapnic respiratory failure:   likely from combination of CHF and medication induced form ativan   FIO2 - 40%  ABG - 1/13 - PH 7.39  Started on ETT on 1/5.     CVS:   # Afib:   history of Afib   hold metoprolol as pt on levophed  AC held for Thrombocytopenia.      CHF:  admitted with CHF exacerbation   d/c lasix   stict I/O and daily weight   echo- EF - 55%    GI:   Tube fed  NG tube in place   # Transaminitis: AST/ALT/ALP elevated 2/2 shock liver  hepatitis panel - negative   CT abdomen shows now CBD dilatation, but does show hypodense lesions  USG abd- Contracted bladder  GI consulted - Dr. Martel.    Heme:  # Thrombocytopenia.    Platelets on admission 59 -> 55 -> 44->48->52->46->34->33  Blood smear 1/10- WNL.   slight elevation in D dimer 2200  f/u frey 13  hit panel - negative    Endo:   #DM:   HbA1c of 6.5  sliding scale q6 while NPO     ID:  # cellulitis:   Fever- resolving   dc Vanco, zosyn (( asp pna)  bld cx- no growth   Sputum cx- GP cocci in pairs       Renal:   Creatine levels improved after IV bolus. REI resolving    urine lytes- WNL  hypernatrmia 153, likely due to free water deficit. Started on 0.45 ns  Hypercalcemia+ f/u vit D, PTHrp    PPx:   was on pradexa - holding   protonix IV for GI  prophylaxis

## 2020-01-14 NOTE — PROGRESS NOTE ADULT - SUBJECTIVE AND OBJECTIVE BOX
This note is incomplete  pt seen and examined.    SUBJECTIVE:  pt is sedated on vent and in nad  pt has low bp on pressor     REVIEW OF SYSTEMS:  Unobtainable    Current meds:    aspirin  chewable 81 milliGRAM(s) Oral daily  atorvastatin 40 milliGRAM(s) Oral at bedtime  chlorhexidine 0.12% Liquid 15 milliLiter(s) Oral Mucosa every 12 hours  chlorhexidine 4% Liquid 1 Application(s) Topical daily  folic acid 1 milliGRAM(s) Oral daily  furosemide   Injectable 40 milliGRAM(s) IV Push two times a day  insulin lispro (HumaLOG) corrective regimen sliding scale   SubCutaneous every 6 hours  midodrine 5 milliGRAM(s) Oral every 8 hours  morphine  - Injectable 4 milliGRAM(s) IV Push every 4 hours PRN  multivitamin 1 Tablet(s) Oral daily  norepinephrine Infusion 0.13 MICROgram(s)/kG/Min IV Continuous <Continuous>  nystatin Powder 1 Application(s) Topical three times a day  pantoprazole   Suspension 40 milliGRAM(s) Enteral Tube daily  predniSONE   Tablet 40 milliGRAM(s) Oral daily  QUEtiapine 25 milliGRAM(s) Oral every 12 hours  sodium chloride 0.45%. 1000 milliLiter(s) IV Continuous <Continuous>  sodium chloride 0.9% lock flush 10 milliLiter(s) IV Push every 1 hour PRN      Vital Signs    T(F): 98.6 (20 @ 07:15), Max: 99.8 (20 @ 16:06)  HR: 110 (20 @ 09:15) (90 - 147)  BP: 108/61 (20 @ 09:15) (70/47 - 138/75)  ABP: 134/78 (20 @ 09:15) (76/54 - 151/85)  RR: 21 (20 @ 09:15) (10 - 30)  SpO2: 100% (20 @ 09:15) (91% - 100%)  Wt(kg): --  CVP(cm H2O): --  CO: --  PCWP: --    I and O's:     @ 07:01  -   @ 07:00  --------------------------------------------------------  IN:    dexmedetomidine Infusion: 606.3 mL    Enteral Tube Flush: 625 mL    Glucerna 1.5: 805 mL    norepinephrine Infusion: 28.1 mL    norepinephrine Infusion: 64.4 mL    Solution: 100 mL  Total IN: 2228.7 mL    OUT:    Indwelling Catheter - Urethral: 1455 mL  Total OUT: 1455 mL    Total NET: 773.7 mL       @ 07:  -   @ 07:00  --------------------------------------------------------  IN:    dexmedetomidine Infusion: 101.2 mL    dexmedetomidine Infusion: 531.2 mL    Enteral Tube Flush: 80 mL    Enteral Tube Flush: 2100 mL    Glucerna 1.5: 840 mL    norepinephrine Infusion: 9.4 mL    norepinephrine Infusion: 42.9 mL    sodium chloride 0.45%: 1300 mL  Total IN: 5004.7 mL    OUT:    Indwelling Catheter - Urethral: 1545 mL  Total OUT: 1545 mL    Total NET: 3459.7 mL        Daily     Daily Weight in k (2020 07:15)    PHYSICAL EXAM:  Constitutional: well developed ,obese and in nad  HEENT: Sclera anicteric  Neck: No JVD  Respiratory: reduced air entry lower lungs with no rales, wheezing or rhonchi  Cardiovascular: S1 and S2 normally heard  Gastrointestinal: soft, nondistended, nontender and normal bowel sounds heard  Extremities: trace  peripheral edema present in both LEs  Neurological: sedated  Skin: Normal turgor      LABS:    CBC:                          12.1   2.69  )-----------( 32       ( 2020 04:10 )             38.4       BMP:        146<H>  |  110<H>  |  62<H>  ----------------------------<  133<H>  5.4<H>   |  30  |  1.08      151<H>  |  115<H>  |  64<H>  ----------------------------<  133<H>  5.6<H>   |  31  |  1.10  -13    152<H>  |  117<H>  |  66<H>  ----------------------------<  135<H>  5.8<H>   |  30  |  1.02  -13    153<H>  |  116<H>  |  62<H>  ----------------------------<  151<H>  5.3   |  29  |  1.09  -    147<H>  |  113<H>  |  61<H>  ----------------------------<  170<H>  4.8   |  28  |  0.95      148<H>  |  111<H>  |  59<H>  ----------------------------<  153<H>  5.2   |  29  |  0.88    Ca    12.3<H>      2020 04:10  Ca    11.8<H>      2020 21:51  Ca    11.9<H>      2020 16:00  Ca    11.5<H>      2020 05:21  Ca    11.2<H>      2020 00:22  Ca    11.1<H>      2020 04:32  Phos  2.7     -  Phos  3.0       Phos  2.1       Phos  2.1     -  Mg     2.4     -  Mg     2.6     -  Mg     2.4     -  Mg     2.4     -12    TPro  5.4<L>  /  Alb  1.5<L>  /  TBili  6.1<H>  /  DBili  x   /  AST  248<H>  /  ALT  170<H>  /  AlkPhos  203<H>    TPro  5.8<L>  /  Alb  1.6<L>  /  TBili  6.5<H>  /  DBili  x   /  AST  264<H>  /  ALT  176<H>  /  AlkPhos  216<H>    TPro  5.9<L>  /  Alb  1.7<L>  /  TBili  5.5<H>  /  DBili  x   /  AST  245<H>  /  ALT  190<H>  /  AlkPhos  219<H>    TPro  6.0  /  Alb  1.8<L>  /  TBili  5.8<H>  /  DBili  x   /  AST  228<H>  /  ALT  204<H>  /  AlkPhos  195<H>        Vitamin D, 1, 25-Dihydroxy: 19.0 pg/mL ( @ 00:01)  Intact PTH: 14 pg/mL ( @ 20:07)  Vitamin D, 25-Hydroxy: 26.8 ng/mL ( @ 19:25)  Thyroid Stimulating Hormone, Serum: 1.25 uU/mL ( @ 11:20)

## 2020-01-14 NOTE — PROGRESS NOTE ADULT - ATTENDING COMMENTS
Patient seen and examined with resident, Addendum to above.    69 yr old man with afib (on Pradaxa), HTN and alcohol abuse presents to the ED with chief complaint of weakness and shortness of breath. Patient is initially admitted to medical floor for CHF exacerbation, afib with RVR, alcohol withdrawal. RRT was called for respiratory distress and lethargy, patient was minimally arousable to sternal rub, not able to follow commands.  + ASP PNA on antibx. . intubated 1/5.     Assessment;  1. Acute respiratory failure  2. Alcohol withdrawal syndrome   3. Atrial fibrillation   4. Heart failure with preserved EF   5. Thrombocytopenia   6. Cirrhosis   7. REI   8. Hypercalcemia    - Cont. mechanical ventilation  - Daily awakening and weaning trials   - Minimally arousable off precedex  - Encephalopathy likely multifactorial, given increased ammonia and hypercalcemia  - Taper down precedex   - Obtain CT scan of the head with out IV contrast   - Start lactulose  - Thiamine and Folate supplementation   - Cont. tube feeds   - Completed antibiotics, cultures are all negative  - Monitor for signs of bleeding, holding heparin given thrombocytopenia   - Monitor urine output  - Persistent hypercalcemia, and now with hypernatremia  - IV lasix with continued free water supplementation  - Monitor BMP  - Work up for secondary hypercalcemia   - Nephrology follow up   - Cont. Calcitonin and add Zometa   - Overall prognosis is guarded at this time, given prolonged intubation may need tracheostomy  - Thoracic surgery consult  - Palliative care consult   - DVT prophylaxis with SCD, and stress ulcer prophylaxis Patient seen and examined with resident, Addendum to above.    69 yr old man with afib (on Pradaxa), HTN and alcohol abuse presents to the ED with chief complaint of weakness and shortness of breath. Patient is initially admitted to medical floor for CHF exacerbation, afib with RVR, alcohol withdrawal. RRT was called for respiratory distress and lethargy, patient was minimally arousable to sternal rub, not able to follow commands.  + ASP PNA on antibx. . intubated 1/5.     Assessment;  1. Acute respiratory failure  2. Alcohol withdrawal syndrome   3. Atrial fibrillation   4. Heart failure with preserved EF   5. Thrombocytopenia   6. Cirrhosis   7. REI   8. Hypercalcemia    - Cont. mechanical ventilation  - Daily awakening and weaning trials   - Minimally arousable off precedex  - Encephalopathy likely multifactorial, given increased ammonia and hypercalcemia  - Taper down precedex   - Obtain CT scan of the head with out IV contrast   - Start lactulose  - Thiamine and Folate supplementation   - Cont. tube feeds   - Completed antibiotics, cultures are all negative  - Monitor for signs of bleeding, holding heparin given thrombocytopenia   - Monitor urine output  - Persistent hypercalcemia, and now with hypernatremia  - IV lasix with continued free water supplementation  - Monitor BMP  - Work up for secondary hypercalcemia   - Check SPEP/UPEP given pancytopenia  - Nephrology follow up   - Cont. Calcitonin and add Zometa   - Overall prognosis is guarded at this time, given prolonged intubation may need tracheostomy  - Thoracic surgery consult  - Palliative care consult   - DVT prophylaxis with SCD, and stress ulcer prophylaxis

## 2020-01-14 NOTE — PROGRESS NOTE ADULT - ASSESSMENT
A/P:  REI: pre -renal azotemia ,unchanged last 24 hrs  -suggest to keep sbp>110 to avoid further renal injury   -Keep patient euvolemic and renal diet  -Avoid Nephrotoxic Meds/ Agents such as (NSAIDs, IV contrast, Aminoglycosides such as gentamicin, -Gadolinium contrast, Phosphate containing enemas, etc..)  -Adjust Medications according to eGFR  -f/u bmp daily    HYPERCALCEMIA: worsening  -continue Calcitonin and Prednisone if no ci   -add Lasix 40mg iv if bp is ok  -f/u Pth-related  protein  -f/u ca level daily  -suggest endocrine consult    EDEMA:multifactorial like chf/liver ds, mild  -avoid iv fluid  -continue lasix prn    HYPERNATREMIA: secondary to  water deficit, improving last 24 hrs  - free water via  GT  prn , avoid hyperosmolar tube feeding if possible switch to isocal   -follow BMP daily  HYPERKALEMIA; mild , r/o secondary to constipation?  -add Laxatives prn   -continue Lokelma prn for k>5.3  -add low k tube feeding

## 2020-01-14 NOTE — PROGRESS NOTE ADULT - SUBJECTIVE AND OBJECTIVE BOX
INTERVAL HPI/OVERNIGHT EVENTS: pt noted to be Tachycardic. Hematuria noted. Persistent hypercalcemia. Hypernatremia resolved. Midodrien added before lowering the pressors. A line removed.    PRESSORS: [ x] YES [ ] NO  WHICH:    ANTIBIOTICS:                  DATE STARTED:  ANTIBIOTICS:                  DATE STARTED:  ANTIBIOTICS:                  DATE STARTED:    Antimicrobial:    Cardiovascular:  furosemide   Injectable 40 milliGRAM(s) IV Push two times a day  midodrine 5 milliGRAM(s) Oral every 8 hours  norepinephrine Infusion 0.13 MICROgram(s)/kG/Min IV Continuous <Continuous>    Pulmonary:    Hematalogic:  aspirin  chewable 81 milliGRAM(s) Oral daily    Other:  atorvastatin 40 milliGRAM(s) Oral at bedtime  calcitonin Injectable 400 International Unit(s) IntraMuscular every 12 hours  chlorhexidine 0.12% Liquid 15 milliLiter(s) Oral Mucosa every 12 hours  chlorhexidine 4% Liquid 1 Application(s) Topical daily  folic acid 1 milliGRAM(s) Oral daily  insulin lispro (HumaLOG) corrective regimen sliding scale   SubCutaneous every 6 hours  lactulose Syrup 30 Gram(s) Oral every 8 hours PRN  morphine  - Injectable 4 milliGRAM(s) IV Push every 4 hours PRN  multivitamin 1 Tablet(s) Oral daily  nystatin Powder 1 Application(s) Topical three times a day  pantoprazole   Suspension 40 milliGRAM(s) Enteral Tube daily  predniSONE   Tablet 40 milliGRAM(s) Oral daily  QUEtiapine 25 milliGRAM(s) Oral every 12 hours  sodium chloride 0.45%. 1000 milliLiter(s) IV Continuous <Continuous>  sodium chloride 0.9% lock flush 10 milliLiter(s) IV Push every 1 hour PRN  zoledronic acid IVPB (ZOMETA) (Non - oncologic) 4 milliGRAM(s) IV Intermittent once    aspirin  chewable 81 milliGRAM(s) Oral daily  atorvastatin 40 milliGRAM(s) Oral at bedtime  calcitonin Injectable 400 International Unit(s) IntraMuscular every 12 hours  chlorhexidine 0.12% Liquid 15 milliLiter(s) Oral Mucosa every 12 hours  chlorhexidine 4% Liquid 1 Application(s) Topical daily  folic acid 1 milliGRAM(s) Oral daily  furosemide   Injectable 40 milliGRAM(s) IV Push two times a day  insulin lispro (HumaLOG) corrective regimen sliding scale   SubCutaneous every 6 hours  lactulose Syrup 30 Gram(s) Oral every 8 hours PRN  midodrine 5 milliGRAM(s) Oral every 8 hours  morphine  - Injectable 4 milliGRAM(s) IV Push every 4 hours PRN  multivitamin 1 Tablet(s) Oral daily  norepinephrine Infusion 0.13 MICROgram(s)/kG/Min IV Continuous <Continuous>  nystatin Powder 1 Application(s) Topical three times a day  pantoprazole   Suspension 40 milliGRAM(s) Enteral Tube daily  predniSONE   Tablet 40 milliGRAM(s) Oral daily  QUEtiapine 25 milliGRAM(s) Oral every 12 hours  sodium chloride 0.45%. 1000 milliLiter(s) IV Continuous <Continuous>  sodium chloride 0.9% lock flush 10 milliLiter(s) IV Push every 1 hour PRN  zoledronic acid IVPB (ZOMETA) (Non - oncologic) 4 milliGRAM(s) IV Intermittent once    Drug Dosing Weight  Height (cm): 180.34 (2020 15:39)  Weight (kg): 125 (2020 11:30)  BMI (kg/m2): 38.4 (2020 11:30)  BSA (m2): 2.42 (2020 11:30)    CENTRAL LINE: [x ] YES [ ] NO  LOCATION:         VILLATORO: [ x] YES [ ] NO          A-LINE:  [ ] YES [ ] NO  LOCATION:             ICU Vital Signs Last 24 Hrs  T(C): 36.9 (2020 12:00), Max: 37.7 (2020 16:06)  T(F): 98.4 (2020 12:00), Max: 99.8 (2020 16:06)  HR: 128 (2020 13:00) (95 - 147)  BP: 107/62 (2020 13:00) (80/53 - 138/75)  BP(mean): 72 (2020 13:00) (56 - 93)  ABP: 110/72 (2020 13:00) (77/51 - 151/85)  ABP(mean): 83 (2020 13:00) (60 - 111)  RR: 15 (2020 13:00) (12 - 27)  SpO2: 96% (2020 13:00) (91% - 100%)      ABG - ( 2020 06:41 )  pH, Arterial: 7.35  pH, Blood: x     /  pCO2: 55    /  pO2: 59    / HCO3: 30    / Base Excess: 3.4   /  SaO2: 88                     @ 07:01  -   @ 07:00  --------------------------------------------------------  IN: 5004.7 mL / OUT: 1545 mL / NET: 3459.7 mL        Mode: AC/ CMV (Assist Control/ Continuous Mandatory Ventilation)  RR (machine): 14  TV (machine): 500  FiO2: 40  PEEP: 5  ITime: 1  MAP: 11  PIP: 28      ROS- could not be removed as pt is sedated for Intubation.     PHYSICAL EXAM:    GENERAL:sedated for intubation  EYES: EOMI, PERRLA,   NECK: Supple, No JVD; Normal thyroid; Trachea midline  NERVOUS SYSTEM:  sedated for intubation  CHEST/LUNG: laboured breathing. Harsh breath sounds.  HEART: tachycardic.  ABDOMEN: Soft, Nontender, Nondistended; Bowel sounds present  EXTREMITIES:  2+ Peripheral Pulses, No clubbing, cyanosis, or edema        LABS:  CBC Full  -  ( 2020 04:10 )  WBC Count : 2.69 K/uL  RBC Count : 3.70 M/uL  Hemoglobin : 12.1 g/dL  Hematocrit : 38.4 %  Platelet Count - Automated : 32 K/uL  Mean Cell Volume : 103.8 fl  Mean Cell Hemoglobin : 32.7 pg  Mean Cell Hemoglobin Concentration : 31.5 gm/dL  Auto Neutrophil # : x  Auto Lymphocyte # : x  Auto Monocyte # : x  Auto Eosinophil # : x  Auto Basophil # : x  Auto Neutrophil % : x  Auto Lymphocyte % : x  Auto Monocyte % : x  Auto Eosinophil % : x  Auto Basophil % : x        146<H>  |  110<H>  |  62<H>  ----------------------------<  133<H>  5.4<H>   |  30  |  1.08    Ca    12.3<H>      2020 04:10  Phos  2.7     -  Mg     2.4         TPro  5.4<L>  /  Alb  1.5<L>  /  TBili  6.1<H>  /  DBili  x   /  AST  248<H>  /  ALT  170<H>  /  AlkPhos  203<H>        Urinalysis Basic - ( 2020 05:54 )    Color: Red / Appearance: Slightly Turbid / S.020 / pH: x  Gluc: x / Ketone: Negative  / Bili: Moderate / Urobili: 4   Blood: x / Protein: 100 / Nitrite: Negative   Leuk Esterase: Small / RBC: >50 /HPF / WBC 0-2 /HPF   Sq Epi: x / Non Sq Epi: Negative /HPF / Bacteria: Trace /HPF          RADIOLOGY & ADDITIONAL STUDIES REVIEWED:      [ ]GOALS OF CARE DISCUSSION WITH PATIENT/FAMILY/PROXY:    CRITICAL CARE TIME SPENT: 35 minutes

## 2020-01-14 NOTE — PROGRESS NOTE ADULT - SUBJECTIVE AND OBJECTIVE BOX
Time of Visit:  Patient seen and examined.     MEDICATIONS  (STANDING):  aspirin  chewable 81 milliGRAM(s) Oral daily  atorvastatin 40 milliGRAM(s) Oral at bedtime  calcitonin Injectable 400 International Unit(s) IntraMuscular every 12 hours  chlorhexidine 0.12% Liquid 15 milliLiter(s) Oral Mucosa every 12 hours  chlorhexidine 4% Liquid 1 Application(s) Topical daily  folic acid 1 milliGRAM(s) Oral daily  furosemide   Injectable 40 milliGRAM(s) IV Push two times a day  insulin lispro (HumaLOG) corrective regimen sliding scale   SubCutaneous every 6 hours  metoclopramide 5 milliGRAM(s) Oral every 8 hours  midodrine 5 milliGRAM(s) Oral every 8 hours  multivitamin 1 Tablet(s) Oral daily  norepinephrine Infusion 0.13 MICROgram(s)/kG/Min (15.234 mL/Hr) IV Continuous <Continuous>  nystatin Powder 1 Application(s) Topical three times a day  pantoprazole   Suspension 40 milliGRAM(s) Enteral Tube daily  predniSONE   Tablet 40 milliGRAM(s) Oral daily  QUEtiapine 25 milliGRAM(s) Oral every 12 hours      MEDICATIONS  (PRN):  lactulose Syrup 30 Gram(s) Oral every 8 hours PRN Titrate upto 3 BMs  morphine  - Injectable 4 milliGRAM(s) IV Push every 4 hours PRN respiratory distress  sodium chloride 0.9% lock flush 10 milliLiter(s) IV Push every 1 hour PRN Pre/post blood products, medications, blood draw, and to maintain line patency       Medications up to date at time of exam.      PHYSICAL EXAMINATION:  Patient has no new complaints.  GENERAL: The patient is a well-developed, well-nourished, in no apparent distress.     Vital Signs Last 24 Hrs  T(C): 37.7 (2020 15:00), Max: 37.7 (2020 15:00)  T(F): 99.8 (2020 15:00), Max: 99.8 (2020 15:00)  HR: 139 (2020 16:32) (95 - 147)  BP: 125/73 (2020 16:30) (80/53 - 138/75)  BP(mean): 85 (2020 16:30) (56 - 98)  RR: 17 (2020 16:32) (12 - 26)  SpO2: 94% (2020 16:32) (91% - 100%)  Mode: AC/ CMV (Assist Control/ Continuous Mandatory Ventilation)  RR (machine): 14  TV (machine): 500  FiO2: 40  PEEP: 5  ITime: 1  MAP: 9  PIP: 20   (if applicable)    Chest Tube (if applicable)    HEENT: Head is normocephalic and atraumatic. Extraocular muscles are intact. Mucous membranes are moist.     NECK: Supple, no palpable adenopathy.    LUNGS: Clear to auscultation, no wheezing, rales, or rhonchi.     HEART: Regular rate and rhythm without murmur.    ABDOMEN: Soft, nontender, and nondistended.  No hepatosplenomegaly is noted.    : No painful voiding, no pelvic pain    EXTREMITIES: Without any cyanosis, clubbing, rash, lesions or edema.    NEUROLOGIC: Awake, alert, oriented, grossly intact    SKIN: Warm, dry, good turgor.      LABS:                        12.1   2.69  )-----------( 32       ( 2020 04:10 )             38.4         150<H>  |  112<H>  |  62<H>  ----------------------------<  113<H>  5.6<H>   |  29  |  1.10    Ca    12.3<H>      2020 15:53  Phos  2.7       Mg     2.4         TPro  5.7<L>  /  Alb  1.6<L>  /  TBili  7.2<H>  /  DBili  x   /  AST  279<H>  /  ALT  173<H>  /  AlkPhos  212<H>        Urinalysis Basic - ( 2020 05:54 )    Color: Red / Appearance: Slightly Turbid / S.020 / pH: x  Gluc: x / Ketone: Negative  / Bili: Moderate / Urobili: 4   Blood: x / Protein: 100 / Nitrite: Negative   Leuk Esterase: Small / RBC: >50 /HPF / WBC 0-2 /HPF   Sq Epi: x / Non Sq Epi: Negative /HPF / Bacteria: Trace /HPF      ABG - ( 2020 06:41 )  pH, Arterial: 7.35  pH, Blood: x     /  pCO2: 55    /  pO2: 59    / HCO3: 30    / Base Excess: 3.4   /  SaO2: 88                CARDIAC MARKERS ( 2020 04:10 )  0.108 ng/mL / x     / 252 U/L / x     / 2.2 ng/mL  CARDIAC MARKERS ( 2020 21:51 )  0.123 ng/mL / x     / x     / x     / x      CARDIAC MARKERS ( 2020 16:00 )  0.098 ng/mL / x     / 400 U/L / x     / 3.2 ng/mL  CARDIAC MARKERS ( 2020 11:20 )  0.093 ng/mL / x     / 344 U/L / x     / 2.7 ng/mL  CARDIAC MARKERS ( 2020 05:21 )  0.089 ng/mL / x     / 334 U/L / x     / 2.4 ng/mL  CARDIAC MARKERS ( 2020 00:22 )  0.084 ng/mL / x     / 186 U/L / x     / 1.4 ng/mL                MICROBIOLOGY: (if applicable)    RADIOLOGY & ADDITIONAL STUDIES:  EKG:   CXR:  < from: Xray Chest 1 View- PORTABLE-Routine (20 @ 06:53) >    EXAM:  XR CHEST PORTABLE ROUTINE 1V                            PROCEDURE DATE:  2020          INTERPRETATION:  Chest portable.    Clinical History: Status post intubation. COPD.    Comparison: 2020.    Single AP view submitted.  Patient's lines and tubes remain unchanged in position.    The evaluation of the cardiomediastinal silhouette is limited on portable technique.    There is patchy right lower lobe airspace consolidation which may reflect atelectasis and/or pneumonia in the appropriate clinical setting.    The left lung apex is not entirely included on this exam.    Impression:    Findings as discussed above.                      CHERI SOLOMON M.D., ATTENDING RADIOLOGIST  This document has been electronically signed. 2020 12:18PM                < end of copied text >  ECHO:    IMPRESSION: 69y Male PAST MEDICAL & SURGICAL HISTORY:  HTN (hypertension)  Atrial fibrillation  No significant past surgical history   p/w           IMP: This is a 69 yr old man with  afib (on Pradaxa), HTN and alcohol abuse presents to the ED with chief complaint of weakness and shortness of breath. Patient is initially admitted to medical floor for CHF exacerbation, afib with RVR, alcohol withdrawal and sepsis 2/2 cellulitis. Patient is AAOx2-3 at baseline, noted lethargic by nurse this morning. Patient is on CIWA protocol and received total 8 mg of ativan Iv push in last 24 hours. RRT was called for respiratory distress and lethargy, patient is minimally arousable to sternal rub, not able to follow commands.  + ASP PNA on antibx. . intubated . Pat is spiking high fevers, neg cultures and neg cxr... will hold antibx . Possible drug related fevers.    Pat requires multiple meds for sedation, will add seroquel after EKG    Pat completed course of antibx. + encephaoloathy and SBT. Pat with prolong intubation. failing daily sbt .      Sugg  -will benefit from trach  -continue daily sbt  -duonebs  -hemodynamic support  -continue care as per icu team    case dismissed with icu team

## 2020-01-15 NOTE — PROGRESS NOTE ADULT - PROBLEM SELECTOR PLAN 2
ikely 2/2 cirrhosis, Alcohol use, Congestive hepatopathy and/ or possible mets to liver.  Please trend LFT's  Hep panel negative  Will need CT biopsy of liver lesions and MRI when stable.   Continue diuresis. likely 2/2 cirrhosis, Alcohol use, Congestive hepatopathy and/ or possible mets to liver.  Please trend LFT's  Hep panel negative  Will need CT biopsy of liver lesions and MRI when stable.   Continue diuresis.

## 2020-01-15 NOTE — CONSULT NOTE ADULT - SUBJECTIVE AND OBJECTIVE BOX
Patient has not been responsive to the primary team today.    Patient is intubated but not sedated, groans to sternal rub, but does not open eyes to voice or withdraw any of the four extremities to nailbed pressure.    Routine EEG shows GBS.    Recs:  - Continue metabolic and infectious workup  - No role for empiric AEDs at this time  - Approved for continuous EEG to evaluate for subclinical seizures overnight      NOTE TO BE COMPLETED - PLEASE REFER TO ABOVE ONLY AND IGNORE INFORMATION BELOW    Neurology Follow up note    Name  CONNER FLORES    HPI:  68 y/o male with PMHx of afib (on Pradaxa), HTN and alcohol abuse presents to the ED with chief complaint of weakness and shortness of breath. Patient is AAOx2 and a vague historian. He reports having multiple falls in the past 2 weeks. He thinks he has experienced LOC sometimes as well. He endorses left lower chest pain, described as worse with movement and pleuritic in nature. He reports lower extremitiy edema over the past few months. He also complains of left lower abdominal pain. He denies nausea or vomiting. He reports currently drinking 1 bottle of wine daily with a shot of scotch. He lives alone and says he ambulates independently.     ED course:  EKG shows afib with RVR  RLE noted to be cool to touch compared to left - bedside doppler confirmed pulses of both LE. (04 Jan 2020 00:23)      Interval History -        Subjective:        MEDICATIONS  (STANDING):  atorvastatin 40 milliGRAM(s) Oral at bedtime  cefepime   IVPB      cefepime   IVPB 2000 milliGRAM(s) IV Intermittent every 8 hours  chlorhexidine 0.12% Liquid 15 milliLiter(s) Oral Mucosa two times a day  chlorhexidine 4% Liquid 1 Application(s) Topical daily  dextrose 5% + sodium chloride 0.45%. 1000 milliLiter(s) (75 mL/Hr) IV Continuous <Continuous>  folic acid 1 milliGRAM(s) Oral daily  insulin lispro (HumaLOG) corrective regimen sliding scale   SubCutaneous every 6 hours  metoclopramide 5 milliGRAM(s) Oral every 8 hours  metoclopramide Injectable 10 milliGRAM(s) IV Push every 6 hours  midodrine 5 milliGRAM(s) Oral every 8 hours  multivitamin 1 Tablet(s) Oral daily  nystatin Powder 1 Application(s) Topical three times a day  pantoprazole   Suspension 40 milliGRAM(s) Enteral Tube daily  pantoprazole  Injectable 40 milliGRAM(s) IV Push two times a day  phenylephrine    Infusion 0.5 MICROgram(s)/kG/Min (23.438 mL/Hr) IV Continuous <Continuous>  predniSONE   Tablet 40 milliGRAM(s) Oral daily  QUEtiapine 25 milliGRAM(s) Oral every 12 hours    MEDICATIONS  (PRN):  fentaNYL    Injectable 25 MICROGram(s) IV Push every 4 hours PRN Severe Pain (7 - 10)  lactulose Syrup 30 Gram(s) Oral every 8 hours PRN Titrate upto 3 BMs  morphine  - Injectable 4 milliGRAM(s) IV Push every 4 hours PRN respiratory distress  sodium chloride 0.9% lock flush 10 milliLiter(s) IV Push every 1 hour PRN Pre/post blood products, medications, blood draw, and to maintain line patency      Allergies    No Known Allergies    Intolerances        Review of Systems:  General: [ ] None, [ ] chills, [ ]fatigue, [ ] fevers  Skin: [ ] None, [ ] rash   HEENT: [ ] None, [ ] head injury, [ ] blurred vision, [ ] double vision, [ ] eye pain, [ ] visual loss, [ ] hearing loss, [ ] deafness, [ ] ear pain, [ ] ringing in the ears, [ ] vertigo, [ ] sinus pain, [ ] voice changes  Neck: [ ] None, [ ] neck stiffness  Respiratory: [ ] None, [ ] cough, [ ] difficulty breathing  Cardiovascular: [ ] None, [ ] calf cramps, [ ] chest pain, [ ] leg pain, [ ] swelling, [ ] rapid heart rate, [ ] shortness of breath  Gastrointestinal: [ ] None, [ ] abdominal pain, [ ] nausea, [ ] vomiting  Musculoskeletal: [ ] None, [ ] back pain, [ ] joint pain, [ ] joint stiffness, [ ] leg cramps, [ ] muscle atrophy, [ ] muscle cramps, [ ] muscle weakness, [ ] swelling of extremities  Neurological: [ ] None, [ ] Dizziness, [ ] decreased memory, [ ] fainting, [ ] focal neurological symptoms, [ ] headaches, [ ] incontinence of stool, [ ] incontinence of urine, [ ] loss of consciousness, [ ] numbness, [ ] seizures, [ ] spinning sensation, [ ] stroke, [ ] trouble walking, [ ] unsteadiness, [ ] visual changes, [ ] weakness  Psychiatric: [ ] None,  [ ] depression, [ ] anxiety, [ ] hallucinations, [ ] inability to concentrate, [ ] mood changes, [ ] panic attacks  Hematology: [ ] None,  [ ] blood clots, [ ] spontaneous bleeding      Objective:   Vital Signs Last 24 Hrs  T(C): 37.2 (16 Jan 2020 00:03), Max: 37.4 (15 Luiz 2020 03:00)  T(F): 99 (16 Jan 2020 00:03), Max: 99.4 (15 Luiz 2020 03:00)  HR: 129 (16 Jan 2020 01:15) (109 - 143)  BP: 115/74 (16 Jan 2020 01:15) (68/41 - 119/46)  BP(mean): 83 (16 Jan 2020 01:15) (46 - 89)  RR: 26 (16 Jan 2020 01:15) (16 - 39)  SpO2: 100% (16 Jan 2020 01:00) (86% - 100%)    General Exam:   General appearance: No acute distress                 Cardiovascular: Pedal dorsalis pulses intact bilaterally    Neurological Exam:  Mental Status: Orientated to self, date and place.  Attention intact.  No dysarthria, aphasia or neglect.  Knowledge intact.  Registration intact.  Short and long term memory grossly intact.      Cranial Nerves: CN I - not tested.  PERRL, EOMI, VFF, no nystagmus or diplopia.  No APD.  Fundi not visualized bilaterally.  CN V1-3 intact to light touch and pinprick.  No facial asymmetry.  Hearing intact to finger rub bilaterally.  Tongue, uvula and palate midline.  Sternocleidomastoid and Trapezius intact bilaterally.    Motor:   Tone: normal.                  Strength: intact throughout  Pronator drift: none                 Dysmeria: None to finger-nose-finger or heel-shin-heel  No truncal ataxia.    Tremor: No resting, postural or action tremor.  No myoclonus.    Sensation: intact to light touch, pinprick, vibration and proprioception    Deep Tendon Reflexes: 1+ bilateral biceps, triceps, brachioradialis, knee and ankle  Toes flexor bilaterally    Gait: normal and stable.      Other:    01-15    144  |  108  |  90<H>  ----------------------------<  93  5.9<H>   |  27  |  2.02<H>    Ca    11.3<H>      15 Luiz 2020 21:58  Phos  3.0     01-15  Mg     2.2     01-15    TPro  5.7<L>  /  Alb  1.5<L>  /  TBili  8.2<H>  /  DBili  x   /  AST  344<H>  /  ALT  188<H>  /  AlkPhos  213<H>  01-15    01-15    144  |  108  |  90<H>  ----------------------------<  93  5.9<H>   |  27  |  2.02<H>    Ca    11.3<H>      15 Luzi 2020 21:58  Phos  3.0     01-15  Mg     2.2     01-15    TPro  5.7<L>  /  Alb  1.5<L>  /  TBili  8.2<H>  /  DBili  x   /  AST  344<H>  /  ALT  188<H>  /  AlkPhos  213<H>  01-15    LIVER FUNCTIONS - ( 15 Luiz 2020 06:27 )  Alb: 1.5 g/dL / Pro: 5.7 g/dL / ALK PHOS: 213 U/L / ALT: 188 U/L DA / AST: 344 U/L / GGT: x             Radiology    EKG:  tele:  TTE:  EEG:                 Please contact the Neurology consult service with any questions.    Vin Hassan MD   of Neurology  [facility] [employer] [facility] at [facility] Neurology Consult Note    Name  CONNER FLORES    HPI:  68 y/o male with PMHx of afib (on Pradaxa), HTN and alcohol abuse presents to the ED with chief complaint of weakness and shortness of breath. Patient is AAOx2 and a vague historian. He reports having multiple falls in the past 2 weeks. He thinks he has experienced LOC sometimes as well. He endorses left lower chest pain, described as worse with movement and pleuritic in nature. He reports lower extremity edema over the past few months. He also complains of left lower abdominal pain. He denies nausea or vomiting. He reports currently drinking 1 bottle of wine daily with a shot of scotch. He lives alone and says he ambulates independently.  ED course:  EKG shows afib with RVR  RLE noted to be cool to touch compared to left - bedside doppler confirmed pulses of both LE. (04 Jan 2020 00:23)    NEURO HPI:  69M who presented with shortness of breath and generalized weakness, requiring intubation, who has become unresponsive on exam by the primary team today.    PAST MEDICAL & SURGICAL HISTORY:  HTN (hypertension)  Atrial fibrillation  No significant past surgical history    MEDICATIONS  (STANDING):  atorvastatin 40 milliGRAM(s) Oral at bedtime  cefepime   IVPB      cefepime   IVPB 2000 milliGRAM(s) IV Intermittent every 8 hours  chlorhexidine 0.12% Liquid 15 milliLiter(s) Oral Mucosa two times a day  chlorhexidine 4% Liquid 1 Application(s) Topical daily  dextrose 5% + sodium chloride 0.45%. 1000 milliLiter(s) (75 mL/Hr) IV Continuous <Continuous>  folic acid 1 milliGRAM(s) Oral daily  insulin lispro (HumaLOG) corrective regimen sliding scale   SubCutaneous every 6 hours  metoclopramide 5 milliGRAM(s) Oral every 8 hours  metoclopramide Injectable 10 milliGRAM(s) IV Push every 6 hours  midodrine 5 milliGRAM(s) Oral every 8 hours  multivitamin 1 Tablet(s) Oral daily  nystatin Powder 1 Application(s) Topical three times a day  pantoprazole   Suspension 40 milliGRAM(s) Enteral Tube daily  pantoprazole  Injectable 40 milliGRAM(s) IV Push two times a day  phenylephrine    Infusion 0.5 MICROgram(s)/kG/Min (23.438 mL/Hr) IV Continuous <Continuous>  predniSONE   Tablet 40 milliGRAM(s) Oral daily  QUEtiapine 25 milliGRAM(s) Oral every 12 hours    MEDICATIONS  (PRN):  fentaNYL    Injectable 25 MICROGram(s) IV Push every 4 hours PRN Severe Pain (7 - 10)  lactulose Syrup 30 Gram(s) Oral every 8 hours PRN Titrate upto 3 BMs  morphine  - Injectable 4 milliGRAM(s) IV Push every 4 hours PRN respiratory distress  sodium chloride 0.9% lock flush 10 milliLiter(s) IV Push every 1 hour PRN Pre/post blood products, medications, blood draw, and to maintain line patency    ALLERGIES:  No Known Allergies    FAMILY HISTORY:  No reported family history of seizures    SOCIAL HISTORY:  History of alcohol abuse present  No reported history of tobacco or illicit drug use    REVIEW OF SYSTEMS:  GENERAL: Unresponsive, Generalized weakness  EYES: No eye  discharge  EAR/NOSE/MOUTH/THROAT: No reported sinus or throat pain  NECK: No stiffness  RESPIRATORY: Recent left lower chest pain  CARDIOVASCULAR: Recent shortness of breath and lower extremity edema  GASTROINTESTINAL: Recent left lower abdominal pain  GENITOURINARY: No reported dysuria, frequency  SKIN: No rashes or lesions  ENDOCRINE: No reported heat or cold intolerance  HEMATOLOGIC: No reported easy bruising or bleeding  PSYCHIATRIC: H/o alcohol abuse  MUSCULOSKELETAL: No joint swelling  NEUROLOGICAL: As per HPI      Objective:   Vital Signs Last 24 Hrs  T(C): 37.2 (16 Jan 2020 00:03), Max: 37.4 (15 Luiz 2020 03:00)  T(F): 99 (16 Jan 2020 00:03), Max: 99.4 (15 Luiz 2020 03:00)  HR: 129 (16 Jan 2020 01:15) (109 - 143)  BP: 115/74 (16 Jan 2020 01:15) (68/41 - 119/46)  BP(mean): 83 (16 Jan 2020 01:15) (46 - 89)  RR: 26 (16 Jan 2020 01:15) (16 - 39)  SpO2: 100% (16 Jan 2020 01:00) (86% - 100%)    General Exam:  General: Intubated, unresponsive  Respiratory: Rapid rate, Diminished breath sounds b/l  Cardiovascular: Rapid rate, Irregular rhythm, Weak b/l radial and pedal pulses    Neurological Exam:  General / Mental Status: Intubated, Not sedated, Does not follow commands.  Neurological exam is limited.  Cranial Nerves: Eyelids closed at baseline, with no resistance to passive opening b/l.  Sluggish pupillary responses to light b/l.  Blink to threat absent b/l, Does not track finger movements with eyes b/l.  No response to pinprick at b/l V1-V3 nor to snap at b/l ears.  No apparent facial asymmetry.  Midline palate and tongue.  No resistance to passive motion of neck b/l.  Motor: Diminished bulk and tone in all four extremities.  All four extremities drop to bed after passive elevation.  No spontaneous movement, rigidity, or spasticity in any of the four extremities.  Sensation: Groans in response to sternal rub.  No response to nailbed pressure in any of the four extremities.  Reflexes: 1+ and symmetric at b/l biceps, triceps, brachioradialis, patellae, and ankles.  Toes mute b/l.  Unable to assess coordination, Romberg sign, or gait in unresponsive patient.      Labs:    01-15    144  |  108  |  90<H>  ----------------------------<  93  5.9<H>   |  27  |  2.02<H>    Ca    11.3<H>      15 Luiz 2020 21:58  Phos  3.0     01-15  Mg     2.2     01-15    TPro  5.7<L>  /  Alb  1.5<L>  /  TBili  8.2<H>  /  DBili  x   /  AST  344<H>  /  ALT  188<H>  /  AlkPhos  213<H>  01-15      Neuroimaging:    CT Head (1/15/20):  - Left basal ganglia hypodensity  - Left frontal partially calcified meningioma    EEG (1/15/20):  Abnormal EEG in an altered patient.  - Generalized background slowing  - Discontinuity    - No evidence of subclinical seizures or status epilepticus      Assessment:  69M with unresponsiveness following shortness of breath requiring intubation, concerning for toxic/metabolic encephalopathy vs. anoxic brain injury, with possible subacute left basal ganglia stroke      Recommendations:    - Continue metabolic and infectious workup    - No role for empiric antiepileptic medications at this time given absence of seizures on EEG    - Approved for continuous EEG to evaluate for subclinical seizures overnight      Please contact the Neurology consult service with any questions.    Vin Hassan MD   of Neurology  [facility] [employer] [facility] at [facility]

## 2020-01-15 NOTE — PROGRESS NOTE ADULT - SUBJECTIVE AND OBJECTIVE BOX
INTERVAL HPI/OVERNIGHT EVENTS: ***    PRESSORS: [ ] YES [ ] NO  WHICH:    ANTIBIOTICS:                  DATE STARTED:  ANTIBIOTICS:                  DATE STARTED:  ANTIBIOTICS:                  DATE STARTED:    Antimicrobial:  cefepime   IVPB 2000 milliGRAM(s) IV Intermittent every 8 hours  cefepime   IVPB        Cardiovascular:  furosemide   Injectable 40 milliGRAM(s) IV Push two times a day  midodrine 5 milliGRAM(s) Oral every 8 hours    Pulmonary:    Hematalogic:    Other:  atorvastatin 40 milliGRAM(s) Oral at bedtime  calcitonin Injectable 400 International Unit(s) IntraMuscular every 12 hours  chlorhexidine 0.12% Liquid 15 milliLiter(s) Oral Mucosa every 12 hours  chlorhexidine 4% Liquid 1 Application(s) Topical daily  dextrose 5% + sodium chloride 0.45%. 1000 milliLiter(s) IV Continuous <Continuous>  folic acid 1 milliGRAM(s) Oral daily  insulin lispro (HumaLOG) corrective regimen sliding scale   SubCutaneous every 6 hours  lactulose Syrup 30 Gram(s) Oral every 8 hours PRN  metoclopramide 5 milliGRAM(s) Oral every 8 hours  morphine  - Injectable 4 milliGRAM(s) IV Push every 4 hours PRN  multivitamin 1 Tablet(s) Oral daily  nystatin Powder 1 Application(s) Topical three times a day  pantoprazole   Suspension 40 milliGRAM(s) Enteral Tube daily  predniSONE   Tablet 40 milliGRAM(s) Oral daily  QUEtiapine 25 milliGRAM(s) Oral every 12 hours  sodium chloride 0.9% lock flush 10 milliLiter(s) IV Push every 1 hour PRN    atorvastatin 40 milliGRAM(s) Oral at bedtime  calcitonin Injectable 400 International Unit(s) IntraMuscular every 12 hours  cefepime   IVPB 2000 milliGRAM(s) IV Intermittent every 8 hours  cefepime   IVPB      chlorhexidine 0.12% Liquid 15 milliLiter(s) Oral Mucosa every 12 hours  chlorhexidine 4% Liquid 1 Application(s) Topical daily  dextrose 5% + sodium chloride 0.45%. 1000 milliLiter(s) IV Continuous <Continuous>  folic acid 1 milliGRAM(s) Oral daily  furosemide   Injectable 40 milliGRAM(s) IV Push two times a day  insulin lispro (HumaLOG) corrective regimen sliding scale   SubCutaneous every 6 hours  lactulose Syrup 30 Gram(s) Oral every 8 hours PRN  metoclopramide 5 milliGRAM(s) Oral every 8 hours  midodrine 5 milliGRAM(s) Oral every 8 hours  morphine  - Injectable 4 milliGRAM(s) IV Push every 4 hours PRN  multivitamin 1 Tablet(s) Oral daily  nystatin Powder 1 Application(s) Topical three times a day  pantoprazole   Suspension 40 milliGRAM(s) Enteral Tube daily  predniSONE   Tablet 40 milliGRAM(s) Oral daily  QUEtiapine 25 milliGRAM(s) Oral every 12 hours  sodium chloride 0.9% lock flush 10 milliLiter(s) IV Push every 1 hour PRN    Drug Dosing Weight  Height (cm): 180.34 (2020 15:39)  Weight (kg): 125 (2020 11:30)  BMI (kg/m2): 38.4 (2020 11:30)  BSA (m2): 2.42 (2020 11:30)    CENTRAL LINE: [ ] YES [ ] NO  LOCATION:         VILLATORO: [ ] YES [ ] NO          A-LINE:  [ ] YES [ ] NO  LOCATION:             ICU Vital Signs Last 24 Hrs  T(C): 37.4 (15 Luiz 2020 03:00), Max: 38.1 (2020 18:30)  T(F): 99.4 (15 Luiz 2020 03:00), Max: 100.5 (2020 18:30)  HR: 130 (15 Luiz 2020 07:00) (95 - 153)  BP: 97/61 (15 Luiz 2020 07:00) (87/53 - 133/91)  BP(mean): 68 (15 Luiz 2020 07:00) (60 - 98)  ABP: 105/63 (15 Luiz 2020 07:00) (94/55 - 142/95)  ABP(mean): 77 (15 Luiz 2020 07:00) (68 - 144)  RR: 27 (15 Luiz 2020 07:00) (12 - 38)  SpO2: 93% (15 Luiz 2020 07:00) (86% - 100%)      ABG - ( 15 Luiz 2020 06:15 )  pH, Arterial: 7.33  pH, Blood: x     /  pCO2: 50    /  pO2: 51    / HCO3: 26    / Base Excess: -0.1  /  SaO2: 82                    -14 @ 07:01  -  -15 @ 07:00  --------------------------------------------------------  IN: 4113.9 mL / OUT: 995 mL / NET: 3118.9 mL        Mode: AC/ CMV (Assist Control/ Continuous Mandatory Ventilation)  RR (machine): 14  TV (machine): 500  FiO2: 40  PEEP: 5  ITime: 1  MAP: 0.1  PIP: 23      REVIEW OF SYSTEMS:    CONSTITUTIONAL: No weakness, fevers or chills  NECK: No pain or stiffness  RESPIRATORY: No cough, wheezing, hemoptysis; No shortness of breath  CARDIOVASCULAR: No chest pain or palpitations  GASTROINTESTINAL: No abdominal or epigastric pain. No nausea, vomiting, No diarrhea or constipation. No melena or hematochezia.  GENITOURINARY: No dysuria, frequency or hematuria  NEUROLOGICAL: No numbness or weakness  All other review of systems is negative unless indicated above      PHYSICAL EXAM:    GENERAL: NAD, well-groomed, well-developed  EYES: EOMI, PERRLA,   NECK: Supple, No JVD; Normal thyroid; Trachea midline  NERVOUS SYSTEM:  Alert & Oriented X3,  Motor Strength 5/5 B/L upper and lower extremities; DTRs 2+ intact and symmetric  CHEST/LUNG: No rales, rhonchi, wheezing   HEART: Regular rate and rhythm; No murmurs,   ABDOMEN: Soft, Nontender, Nondistended; Bowel sounds present  EXTREMITIES:  2+ Peripheral Pulses, No clubbing, cyanosis, or edema        LABS:  CBC Full  -  ( 15 Luiz 2020 06:27 )  WBC Count : 3.67 K/uL  RBC Count : 3.82 M/uL  Hemoglobin : 12.2 g/dL  Hematocrit : 39.1 %  Platelet Count - Automated : 35 K/uL  Mean Cell Volume : 102.4 fl  Mean Cell Hemoglobin : 31.9 pg  Mean Cell Hemoglobin Concentration : 31.2 gm/dL  Auto Neutrophil # : x  Auto Lymphocyte # : x  Auto Monocyte # : x  Auto Eosinophil # : x  Auto Basophil # : x  Auto Neutrophil % : x  Auto Lymphocyte % : x  Auto Monocyte % : x  Auto Eosinophil % : x  Auto Basophil % : x    01-15    144  |  107  |  76<H>  ----------------------------<  114<H>  5.2   |  28  |  1.49<H>    Ca    12.0<H>      15 Luiz 2020 06:27  Phos  3.0     01-15  Mg     2.2     01-15    TPro  5.7<L>  /  Alb  1.5<L>  /  TBili  8.2<H>  /  DBili  x   /  AST  344<H>  /  ALT  188<H>  /  AlkPhos  213<H>  01-15      Urinalysis Basic - ( 2020 05:54 )    Color: Red / Appearance: Slightly Turbid / S.020 / pH: x  Gluc: x / Ketone: Negative  / Bili: Moderate / Urobili: 4   Blood: x / Protein: 100 / Nitrite: Negative   Leuk Esterase: Small / RBC: >50 /HPF / WBC 0-2 /HPF   Sq Epi: x / Non Sq Epi: Negative /HPF / Bacteria: Trace /HPF          RADIOLOGY & ADDITIONAL STUDIES REVIEWED:  ***    [ ]GOALS OF CARE DISCUSSION WITH PATIENT/FAMILY/PROXY:    CRITICAL CARE TIME SPENT: 35 minutes INTERVAL HPI/OVERNIGHT EVENTS: Pt noted to be Tachycardic. Had a 3rd Bloody BM today with brownish oral secretions. To undergo Platelet transfusion today. Pt off pressors and sedation. Bld cx - GN rods. Central lines and A lines removed.     PRESSORS: [ ] YES [x ] NO  WHICH:    ANTIBIOTICS:                  DATE STARTED:  ANTIBIOTICS:                  DATE STARTED:  ANTIBIOTICS:                  DATE STARTED:    Antimicrobial:  cefepime   IVPB 2000 milliGRAM(s) IV Intermittent every 8 hours  cefepime   IVPB        Cardiovascular:  furosemide   Injectable 40 milliGRAM(s) IV Push two times a day  midodrine 5 milliGRAM(s) Oral every 8 hours    Pulmonary:    Hematalogic:    Other:  atorvastatin 40 milliGRAM(s) Oral at bedtime  calcitonin Injectable 400 International Unit(s) IntraMuscular every 12 hours  chlorhexidine 0.12% Liquid 15 milliLiter(s) Oral Mucosa every 12 hours  chlorhexidine 4% Liquid 1 Application(s) Topical daily  dextrose 5% + sodium chloride 0.45%. 1000 milliLiter(s) IV Continuous <Continuous>  folic acid 1 milliGRAM(s) Oral daily  insulin lispro (HumaLOG) corrective regimen sliding scale   SubCutaneous every 6 hours  lactulose Syrup 30 Gram(s) Oral every 8 hours PRN  metoclopramide 5 milliGRAM(s) Oral every 8 hours  morphine  - Injectable 4 milliGRAM(s) IV Push every 4 hours PRN  multivitamin 1 Tablet(s) Oral daily  nystatin Powder 1 Application(s) Topical three times a day  pantoprazole   Suspension 40 milliGRAM(s) Enteral Tube daily  predniSONE   Tablet 40 milliGRAM(s) Oral daily  QUEtiapine 25 milliGRAM(s) Oral every 12 hours  sodium chloride 0.9% lock flush 10 milliLiter(s) IV Push every 1 hour PRN    atorvastatin 40 milliGRAM(s) Oral at bedtime  calcitonin Injectable 400 International Unit(s) IntraMuscular every 12 hours  cefepime   IVPB 2000 milliGRAM(s) IV Intermittent every 8 hours  cefepime   IVPB      chlorhexidine 0.12% Liquid 15 milliLiter(s) Oral Mucosa every 12 hours  chlorhexidine 4% Liquid 1 Application(s) Topical daily  dextrose 5% + sodium chloride 0.45%. 1000 milliLiter(s) IV Continuous <Continuous>  folic acid 1 milliGRAM(s) Oral daily  furosemide   Injectable 40 milliGRAM(s) IV Push two times a day  insulin lispro (HumaLOG) corrective regimen sliding scale   SubCutaneous every 6 hours  lactulose Syrup 30 Gram(s) Oral every 8 hours PRN  metoclopramide 5 milliGRAM(s) Oral every 8 hours  midodrine 5 milliGRAM(s) Oral every 8 hours  morphine  - Injectable 4 milliGRAM(s) IV Push every 4 hours PRN  multivitamin 1 Tablet(s) Oral daily  nystatin Powder 1 Application(s) Topical three times a day  pantoprazole   Suspension 40 milliGRAM(s) Enteral Tube daily  predniSONE   Tablet 40 milliGRAM(s) Oral daily  QUEtiapine 25 milliGRAM(s) Oral every 12 hours  sodium chloride 0.9% lock flush 10 milliLiter(s) IV Push every 1 hour PRN    Drug Dosing Weight  Height (cm): 180.34 (2020 15:39)  Weight (kg): 125 (2020 11:30)  BMI (kg/m2): 38.4 (2020 11:30)  BSA (m2): 2.42 (2020 11:30)    CENTRAL LINE: [ ] YES [x ] NO  LOCATION:         VILLATORO: [x ] YES [ ] NO          A-LINE:  [ ] YES [ x] NO  LOCATION:             ICU Vital Signs Last 24 Hrs  T(C): 37.4 (15 Luiz 2020 03:00), Max: 38.1 (2020 18:30)  T(F): 99.4 (15 Luiz 2020 03:00), Max: 100.5 (2020 18:30)  HR: 130 (15 Luiz 2020 07:00) (95 - 153)  BP: 97/61 (15 Luiz 2020 07:00) (87/53 - 133/91)  BP(mean): 68 (15 Luiz 2020 07:00) (60 - 98)  ABP: 105/63 (15 Luiz 2020 07:00) (94/55 - 142/95)  ABP(mean): 77 (15 Luiz 2020 07:00) (68 - 144)  RR: 27 (15 Luiz 2020 07:00) (12 - 38)  SpO2: 93% (15 Luiz 2020 07:00) (86% - 100%)      ABG - ( 15 Luiz 2020 06:15 )  pH, Arterial: 7.33  pH, Blood: x     /  pCO2: 50    /  pO2: 51    / HCO3: 26    / Base Excess: -0.1  /  SaO2: 82                     @ 07:01  -  01-15 @ 07:00  --------------------------------------------------------  IN: 4113.9 mL / OUT: 995 mL / NET: 3118.9 mL        Mode: AC/ CMV (Assist Control/ Continuous Mandatory Ventilation)  RR (machine): 14  TV (machine): 500  FiO2: 40  PEEP: 5  ITime: 1  MAP: 0.1  PIP: 23      REVIEW OF SYSTEMS:    could not be assesed as the pt is on Ventilation.       PHYSICAL EXAM:    GENERAL: Obese, lethargic man on ventillation.   EYES: EOMI, PERRLA,   NECK: Supple, No JVD; Normal thyroid; Trachea midline  NERVOUS SYSTEM:   off pressors he is still not awake or responding to verbal and pain stimulus. minimal movements of limbs.  CHEST/LUNG: Brownish secretions noted in the suction tube. Harsh BS noted b/l+, laboured breathing+  HEART: tachycardia+ 130's  ABDOMEN: Bloody bowel movement again. Palpable mass in RUQ  EXTREMITIES: Pedal edema resolving, edema noted b/l hands        LABS:  CBC Full  -  ( 15 Luiz 2020 06:27 )  WBC Count : 3.67 K/uL  RBC Count : 3.82 M/uL  Hemoglobin : 12.2 g/dL  Hematocrit : 39.1 %  Platelet Count - Automated : 35 K/uL  Mean Cell Volume : 102.4 fl  Mean Cell Hemoglobin : 31.9 pg  Mean Cell Hemoglobin Concentration : 31.2 gm/dL  Auto Neutrophil # : x  Auto Lymphocyte # : x  Auto Monocyte # : x  Auto Eosinophil # : x  Auto Basophil # : x  Auto Neutrophil % : x  Auto Lymphocyte % : x  Auto Monocyte % : x  Auto Eosinophil % : x  Auto Basophil % : x    01-15    144  |  107  |  76<H>  ----------------------------<  114<H>  5.2   |  28  |  1.49<H>    Ca    12.0<H>      15 Luiz 2020 06:27  Phos  3.0     01-15  Mg     2.2     01-15    TPro  5.7<L>  /  Alb  1.5<L>  /  TBili  8.2<H>  /  DBili  x   /  AST  344<H>  /  ALT  188<H>  /  AlkPhos  213<H>  01-15      Urinalysis Basic - ( 2020 05:54 )    Color: Red / Appearance: Slightly Turbid / S.020 / pH: x  Gluc: x / Ketone: Negative  / Bili: Moderate / Urobili: 4   Blood: x / Protein: 100 / Nitrite: Negative   Leuk Esterase: Small / RBC: >50 /HPF / WBC 0-2 /HPF   Sq Epi: x / Non Sq Epi: Negative /HPF / Bacteria: Trace /HPF          RADIOLOGY & ADDITIONAL STUDIES REVIEWED:      [ ]GOALS OF CARE DISCUSSION WITH PATIENT/FAMILY/PROXY:    CRITICAL CARE TIME SPENT: 35 minutes

## 2020-01-15 NOTE — PROGRESS NOTE ADULT - ASSESSMENT
70 y/o male with PMHx of afib (on Pradaxa), HTN and alcohol abuse presents to the ED with chief complaint of weakness and shortness of breath. Patient is initially admitted to medical floor for CHF exacerbation, afib with RVR, alcohol withdrawal and sepsis 2/2 cellulitis. Patient is AAOx2-3 at baseline, noted lethargic by nurse this morning. Patient is on CIWA protocol and received total 8 mg of ativan Iv push in last 24 hours. RRT was called for respiratory distress and lethargy, patient is minimally arousable to sternal rub, not able to follow commands.     Plan:     Neuro:   AAOx2-3 at baseline   Pt noted to be less responsive and more lethargi despite being off the pressors,  CT head f/u   Intubated in ICU on 1/5. Spoken to family about Tracheostomy tube. Family agreed with Tracheostomy.     Respi:   Acute hypercapnic respiratory failure: CHF exc VS Benzo overdose  Concerns of new Aspiration Pna. started on Vanco. f/u CT chest  FIO2 - 50%  ABG - 1/14 - PH 7.33  Started on ETT on 1/5.     CVS:   # Afib:   history of Afib   AC held for Thrombocytopenia.  #CHF:  admitted with CHF exacerbation   On lasix 40mg iv bd   stict I/O and daily weight   echo- EF - 55%  # tachycardia: ranging between 110- 130.    GI:   Pt having bloody BM and brownish oral secretions  Tube fed held   # Transaminitis: AST/ALT/ALP elevated 2/2 shock liver  hepatitis panel - negative   CT abdomen shows now CBD dilatation, but does show hypodense lesions  USG abd- Contracted bladder. f/u repeat Ct abd  GI consulted - Dr. Martel.    Heme:  # Thrombocytopenia and Leucopenia  plan for platelet transfusion today  Platelets on admission 59 -> 55 -> 44->48->52->46->34->33->35  Blood smear 1/10- WNL.   slight elevation in D dimer 2200, frey 13- vehit panel - negative  f/u SPEP and UPEP    Endo:   #DM:   HbA1c of 6.5  sliding scale q6 w    ID:  # cellulitis:   Fever- resolving   On Vanco and cefepime  bld cx- GN rods 1/15  Sputum cx- GP cocci in pairs       Renal:   Creatine levels trended up 1.47   urine lytes- WNL  hypernatrmia- resolving  d/c free water  Hypercalcemia+ f/u vit D, PTHrp  f/u Onco surgeon    PPx:   was on pradexa - holding   protonix IV for GI  prophylaxis

## 2020-01-15 NOTE — PROGRESS NOTE ADULT - SUBJECTIVE AND OBJECTIVE BOX
I would recommend an office visit, she needs a FBOT or guaiac in clinic and a CBC.    Note is incomplete  pt seen and examined.pts current chart reviewed and case discussed with resident covering.    SUBJECTIVE:  pt feels fine and denies cp,sob,gi or gu/uremic  symptons    REVIEW OF SYSTEMS:  CONSTITUTIONAL: No weakness, fevers or chills  EYES/ENT: No visual changes;  No vertigo or throat pain   NECK: No pain or stiffness  RESPIRATORY: No cough, wheezing, hemoptysis; No shortness of breath  CARDIOVASCULAR: No chest pain or palpitations  GASTROINTESTINAL: No abdominal or epigastric pain. No nausea, vomiting, or hematemesis; No diarrhea or constipation. No melena or hematochezia.  GENITOURINARY: No dysuria, frequency , hematuria, flank pain or nocturia  NEUROLOGICAL: No numbness or weakness  SKIN: No itching, burning, rashes, or lesions   All other review of systems is negative unless indicated above    Current meds:    atorvastatin 40 milliGRAM(s) Oral at bedtime  calcitonin Injectable 400 International Unit(s) IntraMuscular every 12 hours  cefepime   IVPB      cefepime   IVPB 2000 milliGRAM(s) IV Intermittent every 8 hours  chlorhexidine 0.12% Liquid 15 milliLiter(s) Oral Mucosa every 12 hours  chlorhexidine 4% Liquid 1 Application(s) Topical daily  dextrose 5% + sodium chloride 0.45%. 1000 milliLiter(s) IV Continuous <Continuous>  folic acid 1 milliGRAM(s) Oral daily  furosemide   Injectable 40 milliGRAM(s) IV Push two times a day  insulin lispro (HumaLOG) corrective regimen sliding scale   SubCutaneous every 6 hours  lactulose Syrup 30 Gram(s) Oral every 8 hours PRN  metoclopramide 5 milliGRAM(s) Oral every 8 hours  midodrine 5 milliGRAM(s) Oral every 8 hours  morphine  - Injectable 4 milliGRAM(s) IV Push every 4 hours PRN  multivitamin 1 Tablet(s) Oral daily  nystatin Powder 1 Application(s) Topical three times a day  pantoprazole   Suspension 40 milliGRAM(s) Enteral Tube daily  predniSONE   Tablet 40 milliGRAM(s) Oral daily  QUEtiapine 25 milliGRAM(s) Oral every 12 hours  sodium chloride 0.9% lock flush 10 milliLiter(s) IV Push every 1 hour PRN  vancomycin  IVPB 1250 milliGRAM(s) IV Intermittent every 12 hours      Vital Signs    T(F): 98.9 (01-15-20 @ 08:00), Max: 100.5 (01-14-20 @ 18:30)  HR: 122 (01-15-20 @ 12:15) (109 - 153)  BP: 98/57 (01-15-20 @ 11:00) (91/60 - 133/91)  ABP: 103/63 (01-15-20 @ 09:00) (94/55 - 142/95)  RR: 24 (01-15-20 @ 11:00) (16 - 38)  SpO2: 97% (01-15-20 @ 12:15) (86% - 100%)  Wt(kg): --  CVP(cm H2O): --  CO: --  PCWP: --    I and O's:    01-13 @ 07:01  -  01-14 @ 07:00  --------------------------------------------------------  IN:    dexmedetomidine Infusion: 101.2 mL    dexmedetomidine Infusion: 531.2 mL    Enteral Tube Flush: 2100 mL    Enteral Tube Flush: 80 mL    Glucerna 1.5: 840 mL    norepinephrine Infusion: 42.9 mL    norepinephrine Infusion: 9.4 mL    sodium chloride 0.45%: 1300 mL    Solution: 100 mL  Total IN: 5104.7 mL    OUT:    Indwelling Catheter - Urethral: 1545 mL  Total OUT: 1545 mL    Total NET: 3559.7 mL      01-14 @ 07:01  -  01-15 @ 07:00  --------------------------------------------------------  IN:    dexmedetomidine Infusion: 21.9 mL    dextrose 5% + sodium chloride 0.45%.: 750 mL    Enteral Tube Flush: 1200 mL    Enteral Tube Flush: 80 mL    Glucerna 1.5: 420 mL    norepinephrine Infusion: 42 mL    sodium chloride 0.45%: 500 mL    Solution: 1000 mL    Solution: 100 mL  Total IN: 4113.9 mL    OUT:    Indwelling Catheter - Urethral: 995 mL  Total OUT: 995 mL    Total NET: 3118.9 mL        Daily     Daily     PHYSICAL EXAM:  Constitutional: well developed, well nourished  and in nad  HEENT: PERRLA,  no icteric sclera and mild pallor of conjunctiva noted  Neck: No JVD, thyromegaly or adenopathy  Respiratory: reduced air entry lower lungs with no rales, wheezing or rhonchi  Cardiovascular: S1 and S2 normally heard  Gastrointestinal: soft, nondistended, nontender and normal bowel sounds heard  Extremities: No peripheral edema or cyanosis  Neurological: A/O x 3, no focal deficits  : No flank or cva tenderness palpated.  Skin: No rashes      LABS:    CBC:                          11.5   3.15  )-----------( 33       ( 15 Luiz 2020 12:32 )             36.3           BMP:    01-15    144  |  107  |  76<H>  ----------------------------<  114<H>  5.2   |  28  |  1.49<H>  01-14    143  |  107  |  71<H>  ----------------------------<  148<H>  5.2   |  28  |  1.40<H>  01-14    150<H>  |  112<H>  |  62<H>  ----------------------------<  113<H>  5.6<H>   |  29  |  1.10  01-14    146<H>  |  110<H>  |  62<H>  ----------------------------<  133<H>  5.4<H>   |  30  |  1.08  01-13    151<H>  |  115<H>  |  64<H>  ----------------------------<  133<H>  5.6<H>   |  31  |  1.10  01-13    152<H>  |  117<H>  |  66<H>  ----------------------------<  135<H>  5.8<H>   |  30  |  1.02  01-13    153<H>  |  116<H>  |  62<H>  ----------------------------<  151<H>  5.3   |  29  |  1.09  01-13    147<H>  |  113<H>  |  61<H>  ----------------------------<  170<H>  4.8   |  28  |  0.95    Ca    12.0<H>      15 Luiz 2020 06:27  Ca    12.5<H>      14 Jan 2020 23:20  Ca    12.3<H>      14 Jan 2020 15:53  Ca    12.3<H>      14 Jan 2020 04:10  Ca    11.8<H>      13 Jan 2020 21:51  Ca    11.9<H>      13 Jan 2020 16:00  Ca    11.5<H>      13 Jan 2020 05:21  Ca    11.2<H>      13 Jan 2020 00:22  Phos  3.0     01-15  Phos  2.7     01-14  Phos  3.0     01-13  Phos  2.1     01-13  Mg     2.2     01-15  Mg     2.4     01-14  Mg     2.6     01-13  Mg     2.4     01-13    TPro  5.7<L>  /  Alb  1.5<L>  /  TBili  8.2<H>  /  DBili  x   /  AST  344<H>  /  ALT  188<H>  /  AlkPhos  213<H>  01-15  TPro  5.8<L>  /  Alb  1.6<L>  /  TBili  7.6<H>  /  DBili  x   /  AST  345<H>  /  ALT  193<H>  /  AlkPhos  217<H>  01-14  TPro  5.7<L>  /  Alb  1.6<L>  /  TBili  7.2<H>  /  DBili  x   /  AST  279<H>  /  ALT  173<H>  /  AlkPhos  212<H>  01-14  TPro  5.4<L>  /  Alb  1.5<L>  /  TBili  6.1<H>  /  DBili  x   /  AST  248<H>  /  ALT  170<H>  /  AlkPhos  203<H>  01-14  TPro  5.8<L>  /  Alb  1.6<L>  /  TBili  6.5<H>  /  DBili  x   /  AST  264<H>  /  ALT  176<H>  /  AlkPhos  216<H>  01-13  TPro  5.9<L>  /  Alb  1.7<L>  /  TBili  5.5<H>  /  DBili  x   /  AST  245<H>  /  ALT  190<H>  /  AlkPhos  219<H>  01-13      Vitamin D, 1, 25-Dihydroxy: 19.0 pg/mL (01-14 @ 00:01)  Intact PTH: 14 pg/mL (01-13 @ 20:07)  Vitamin D, 25-Hydroxy: 26.8 ng/mL (01-13 @ 19:25)  Thyroid Stimulating Hormone, Serum: 1.25 uU/mL (01-13 @ 11:20)      URINE STUDIES:        Eosinophil Count, Urine: Negative (01-10 @ 09:20)  Osmolality, Random Urine: 742 mos/kg (01-09 @ 12:14)  Sodium, Random Urine: 46 mmol/L (01-09 @ 12:14)  Creatinine, Random Urine: 88 mg/dL (01-09 @ 12:14)  Chloride, Random Urine: <10 mmol/L (01-09 @ 12:14)                  RADIOLOGY & ADDITIONAL STUDIES: pt seen and examined.    SUBJECTIVE:  pt is sedated on ventilator and in nad  pt is non oliguric with dark colored urine via mansfield cath    REVIEW OF SYSTEMS:  Unobtainable  Current meds:    atorvastatin 40 milliGRAM(s) Oral at bedtime  calcitonin Injectable 400 International Unit(s) IntraMuscular every 12 hours  cefepime   IVPB      cefepime   IVPB 2000 milliGRAM(s) IV Intermittent every 8 hours  chlorhexidine 0.12% Liquid 15 milliLiter(s) Oral Mucosa every 12 hours  chlorhexidine 4% Liquid 1 Application(s) Topical daily  dextrose 5% + sodium chloride 0.45%. 1000 milliLiter(s) IV Continuous <Continuous>  folic acid 1 milliGRAM(s) Oral daily  furosemide   Injectable 40 milliGRAM(s) IV Push two times a day  insulin lispro (HumaLOG) corrective regimen sliding scale   SubCutaneous every 6 hours  lactulose Syrup 30 Gram(s) Oral every 8 hours PRN  metoclopramide 5 milliGRAM(s) Oral every 8 hours  midodrine 5 milliGRAM(s) Oral every 8 hours  morphine  - Injectable 4 milliGRAM(s) IV Push every 4 hours PRN  multivitamin 1 Tablet(s) Oral daily  nystatin Powder 1 Application(s) Topical three times a day  pantoprazole   Suspension 40 milliGRAM(s) Enteral Tube daily  predniSONE   Tablet 40 milliGRAM(s) Oral daily  QUEtiapine 25 milliGRAM(s) Oral every 12 hours  sodium chloride 0.9% lock flush 10 milliLiter(s) IV Push every 1 hour PRN  vancomycin  IVPB 1250 milliGRAM(s) IV Intermittent every 12 hours      Vital Signs    T(F): 98.9 (01-15-20 @ 08:00), Max: 100.5 (01-14-20 @ 18:30)  HR: 122 (01-15-20 @ 12:15) (109 - 153)  BP: 98/57 (01-15-20 @ 11:00) (91/60 - 133/91)  ABP: 103/63 (01-15-20 @ 09:00) (94/55 - 142/95)  RR: 24 (01-15-20 @ 11:00) (16 - 38)  SpO2: 97% (01-15-20 @ 12:15) (86% - 100%)  Wt(kg): --  CVP(cm H2O): --  CO: --  PCWP: --    I and O's:    01-13 @ 07:01 - 01-14 @ 07:00  --------------------------------------------------------  IN:    dexmedetomidine Infusion: 101.2 mL    dexmedetomidine Infusion: 531.2 mL    Enteral Tube Flush: 2100 mL    Enteral Tube Flush: 80 mL    Glucerna 1.5: 840 mL    norepinephrine Infusion: 42.9 mL    norepinephrine Infusion: 9.4 mL    sodium chloride 0.45%: 1300 mL    Solution: 100 mL  Total IN: 5104.7 mL    OUT:    Indwelling Catheter - Urethral: 1545 mL  Total OUT: 1545 mL    Total NET: 3559.7 mL      01-14 @ 07:01  -  01-15 @ 07:00  --------------------------------------------------------  IN:    dexmedetomidine Infusion: 21.9 mL    dextrose 5% + sodium chloride 0.45%.: 750 mL    Enteral Tube Flush: 1200 mL    Enteral Tube Flush: 80 mL    Glucerna 1.5: 420 mL    norepinephrine Infusion: 42 mL    sodium chloride 0.45%: 500 mL    Solution: 1000 mL    Solution: 100 mL  Total IN: 4113.9 mL    OUT:    Indwelling Catheter - Urethral: 995 mL  Total OUT: 995 mL    Total NET: 3118.9 mL        Daily     Daily     PHYSICAL EXAM:  Constitutional: well developed ,obese and in nad  HEENT: Sclera anicteric  Neck: No JVD  Respiratory: reduced air entry lower lungs with no rales, wheezing or rhonchi  Cardiovascular: S1 and S2 normally heard  Gastrointestinal: soft, nondistended, nontender and normal bowel sounds heard  Extremities: trace  peripheral edema present in both LEs  Neurological: sedated  Skin: Normal turgor      LABS:    CBC:                          11.5   3.15  )-----------( 33       ( 15 Luiz 2020 12:32 )             36.3           BMP:    01-15    144  |  107  |  76<H>  ----------------------------<  114<H>  5.2   |  28  |  1.49<H>  01-14    143  |  107  |  71<H>  ----------------------------<  148<H>  5.2   |  28  |  1.40<H>  01-14    150<H>  |  112<H>  |  62<H>  ----------------------------<  113<H>  5.6<H>   |  29  |  1.10  01-14    146<H>  |  110<H>  |  62<H>  ----------------------------<  133<H>  5.4<H>   |  30  |  1.08  01-13    151<H>  |  115<H>  |  64<H>  ----------------------------<  133<H>  5.6<H>   |  31  |  1.10  01-13    152<H>  |  117<H>  |  66<H>  ----------------------------<  135<H>  5.8<H>   |  30  |  1.02  01-13    153<H>  |  116<H>  |  62<H>  ----------------------------<  151<H>  5.3   |  29  |  1.09  01-13    147<H>  |  113<H>  |  61<H>  ----------------------------<  170<H>  4.8   |  28  |  0.95    Ca    12.0<H>      15 Jan 2020 06:27  Ca    12.5<H>      14 Jan 2020 23:20  Ca    12.3<H>      14 Jan 2020 15:53  Ca    12.3<H>      14 Jan 2020 04:10  Ca    11.8<H>      13 Jan 2020 21:51  Ca    11.9<H>      13 Jan 2020 16:00  Ca    11.5<H>      13 Jan 2020 05:21  Ca    11.2<H>      13 Jan 2020 00:22  Phos  3.0     01-15  Phos  2.7     01-14  Phos  3.0     01-13  Phos  2.1     01-13  Mg     2.2     01-15  Mg     2.4     01-14  Mg     2.6     01-13  Mg     2.4     01-13    TPro  5.7<L>  /  Alb  1.5<L>  /  TBili  8.2<H>  /  DBili  x   /  AST  344<H>  /  ALT  188<H>  /  AlkPhos  213<H>  01-15  TPro  5.8<L>  /  Alb  1.6<L>  /  TBili  7.6<H>  /  DBili  x   /  AST  345<H>  /  ALT  193<H>  /  AlkPhos  217<H>  01-14  TPro  5.7<L>  /  Alb  1.6<L>  /  TBili  7.2<H>  /  DBili  x   /  AST  279<H>  /  ALT  173<H>  /  AlkPhos  212<H>  01-14  TPro  5.4<L>  /  Alb  1.5<L>  /  TBili  6.1<H>  /  DBili  x   /  AST  248<H>  /  ALT  170<H>  /  AlkPhos  203<H>  01-14  TPro  5.8<L>  /  Alb  1.6<L>  /  TBili  6.5<H>  /  DBili  x   /  AST  264<H>  /  ALT  176<H>  /  AlkPhos  216<H>  01-13  TPro  5.9<L>  /  Alb  1.7<L>  /  TBili  5.5<H>  /  DBili  x   /  AST  245<H>  /  ALT  190<H>  /  AlkPhos  219<H>  01-13      Vitamin D, 1, 25-Dihydroxy: 19.0 pg/mL (01-14 @ 00:01)  Intact PTH: 14 pg/mL (01-13 @ 20:07)  Vitamin D, 25-Hydroxy: 26.8 ng/mL (01-13 @ 19:25)  Thyroid Stimulating Hormone, Serum: 1.25 uU/mL (01-13 @ 11:20)                        RADIOLOGY & ADDITIONAL STUDIES:

## 2020-01-15 NOTE — CONSULT NOTE ADULT - ATTENDING COMMENTS
I counseled the primary team about EEG findings and the importance of maintaining continuous EEG monitoring and completing a metabolic and infectious workup for potential causes of his unresponsiveness.

## 2020-01-15 NOTE — PROGRESS NOTE ADULT - ASSESSMENT
A/P:  REI: pre -renal azotemia ,unchanged last 24 hrs  -suggest to keep sbp>110 to avoid further renal injury   -Keep patient euvolemic and renal diet  -Avoid Nephrotoxic Meds/ Agents such as (NSAIDs, IV contrast, Aminoglycosides such as gentamicin, -Gadolinium contrast, Phosphate containing enemas, etc..)  -Adjust Medications according to eGFR  -f/u bmp daily    HYPERCALCEMIA: worsening  -continue Calcitonin and Prednisone if no ci   -add Lasix 40mg iv if bp is ok  -f/u Pth-related  protein  -f/u ca level daily  -suggest endocrine consult    EDEMA:multifactorial like chf/liver ds, mild  -avoid iv fluid  -continue lasix prn    HYPERNATREMIA: secondary to  water deficit, improving last 24 hrs  - free water via  GT  prn , avoid hyperosmolar tube feeding if possible switch to isocal   -follow BMP daily  HYPERKALEMIA; mild , r/o secondary to constipation?  -add Laxatives prn   -continue Lokelma prn for k>5.3  -add low k tube feeding A/P:  REI: pre -renal azotemia ,worsening  last 24 hrs  -suggest to keep sbp>110 to avoid further renal injury   -Keep patient euvolemic and renal diet  -Avoid Nephrotoxic Meds/ Agents such as (NSAIDs, IV contrast, Aminoglycosides such as gentamicin, -Gadolinium contrast, Phosphate containing enemas, etc..)  -Adjust Medications according to eGFR  -f/u bmp daily    HYPERCALCEMIA: mildly better   -continue Calcitonin and Prednisone if no ci   - Lasix 40mg iv if bp is ok  -f/u Pth-related  protein  -f/u ca level daily  -suggest endocrine consult    EDEMA:multifactorial like chf/liver ds, mild-stable  -avoid iv fluid  -continue lasix prn    HYPERNATREMIA: secondary to  water deficit, improved  - free water via  GT  prn , avoid hyperosmolar tube feeding if possible switch to isocal   -follow BMP daily  HYPERKALEMIA; mild    -continue Lokelma prn for k>5.3  - low k tube feeding

## 2020-01-15 NOTE — PROGRESS NOTE ADULT - PROBLEM SELECTOR PLAN 1
Pt having bloody BM  H/H stable Pt having bloody BM this am  coffee ground emesis also noted in suction container   H/H stable  f/u repeat CT A/P

## 2020-01-15 NOTE — PROGRESS NOTE ADULT - ASSESSMENT
68 y/o male with PMHx of afib (on Pradaxa), HTN and alcohol abuse presents to the ED with chief complaint of weakness and shortness of breath. Patient is initially admitted to medical floor for CHF exacerbation, afib with RVR, alcohol withdrawal and sepsis 2/2 cellulitis. He was transferred to ICU after RRT for respiratory distress. He is s/p CT showing nodular liver with multiple lucencies which could be mets. Plts remain very low which could be seen in sepsis or cirrhosis, ITP etc. No biopsy of liver or lesions done. Patient remains intubated off sedation but lethargic. 70 y/o male with PMHx of afib (on Pradaxa), HTN and alcohol abuse presents to the ED with chief complaint of weakness and shortness of breath. Patient is initially admitted to medical floor for CHF exacerbation, afib with RVR, alcohol withdrawal and sepsis 2/2 cellulitis. He was transferred to ICU after RRT for respiratory distress. He is s/p CT showing nodular liver with multiple lucencies which could be mets. Plts remain very low which could be seen in sepsis or cirrhosis, ITP etc. No biopsy of liver or lesions done. Patient remains intubated off sedation but lethargic.  Prognosis is poor in this patient with underlying cirrhosis, renal failure, encephalopathy, and respiratory failure.

## 2020-01-15 NOTE — PROGRESS NOTE ADULT - SUBJECTIVE AND OBJECTIVE BOX
GI PROGRESS NOTE    Patient is a 69y old  Male who presents with a chief complaint of fever (14 Jan 2020 12:42)      HPI:  68 y/o male with PMHx of afib (on Pradaxa), HTN and alcohol abuse presents to the ED with chief complaint of weakness and shortness of breath. Patient is AAOx2 and a vague historian. He reports having multiple falls in the past 2 weeks. He thinks he has experienced LOC sometimes as well. He endorses left lower chest pain, described as worse with movement and pleuritic in nature. He reports lower extremitiy edema over the past few months. He also complains of left lower abdominal pain. He denies nausea or vomiting. He reports currently drinking 1 bottle of wine daily with a shot of scotch. He lives alone and says he ambulates independently.     ED course:  EKG shows afib with RVR  RLE noted to be cool to touch compared to left - bedside doppler confirmed pulses of both LE. (04 Jan 2020 00:23)          ______________________________________________________________________  PMH/PSH:  PAST MEDICAL & SURGICAL HISTORY:  HTN (hypertension)  Atrial fibrillation  No significant past surgical history    ______________________________________________________________________  MEDS:  MEDICATIONS  (STANDING):  atorvastatin 40 milliGRAM(s) Oral at bedtime  calcitonin Injectable 400 International Unit(s) IntraMuscular every 12 hours  cefepime   IVPB      cefepime   IVPB 2000 milliGRAM(s) IV Intermittent every 8 hours  chlorhexidine 0.12% Liquid 15 milliLiter(s) Oral Mucosa every 12 hours  chlorhexidine 4% Liquid 1 Application(s) Topical daily  dextrose 5% + sodium chloride 0.45%. 1000 milliLiter(s) (75 mL/Hr) IV Continuous <Continuous>  folic acid 1 milliGRAM(s) Oral daily  furosemide   Injectable 40 milliGRAM(s) IV Push two times a day  insulin lispro (HumaLOG) corrective regimen sliding scale   SubCutaneous every 6 hours  metoclopramide 5 milliGRAM(s) Oral every 8 hours  midodrine 5 milliGRAM(s) Oral every 8 hours  multivitamin 1 Tablet(s) Oral daily  nystatin Powder 1 Application(s) Topical three times a day  pantoprazole   Suspension 40 milliGRAM(s) Enteral Tube daily  predniSONE   Tablet 40 milliGRAM(s) Oral daily  QUEtiapine 25 milliGRAM(s) Oral every 12 hours  vancomycin  IVPB 1250 milliGRAM(s) IV Intermittent every 12 hours    MEDICATIONS  (PRN):  lactulose Syrup 30 Gram(s) Oral every 8 hours PRN Titrate upto 3 BMs  morphine  - Injectable 4 milliGRAM(s) IV Push every 4 hours PRN respiratory distress  sodium chloride 0.9% lock flush 10 milliLiter(s) IV Push every 1 hour PRN Pre/post blood products, medications, blood draw, and to maintain line patency    ______________________________________________________________________  ALL:   Allergies    No Known Allergies    Intolerances      ______________________________________________________________________  SH: Social History:  Former smoker - 1ppd for 40 years; quit at age 57; drinks heavily - 1 bottle of wine daily; lives alone (04 Jan 2020 00:23)    ______________________________________________________________________  FH:  FAMILY HISTORY:  No pertinent family history in first degree relatives    ______________________________________________________________________  ROS:    CONSTITUTIONAL:  No weight loss, fever, chills, weakness or fatigue.    HEENT:  Eyes:  No visual loss, blurred vision, double vision or yellow sclerae. Ears, Nose, Throat:  No hearing loss, sneezing, congestion, runny nose or sore throat.    SKIN:  No rash or itching.    CARDIOVASCULAR:  No chest pain, chest pressure or chest discomfort. No palpitations or edema.    RESPIRATORY:  No shortness of breath, cough or sputum.    GASTROINTESTINAL:  SEE HPI    GENITOURINARY:  No dysuria, hematuria, urinary frequency    NEUROLOGICAL:  No headache, dizziness, syncope, paralysis, ataxia, numbness or tingling in the extremities. No change in bowel or bladder control.    MUSCULOSKELETAL:  No muscle, back pain, joint pain or stiffness.    HEMATOLOGIC:  No anemia, bleeding or bruising.    LYMPHATICS:  No enlarged nodes. No history of splenectomy.    PSYCHIATRIC:  No history of depression or anxiety.    ENDOCRINOLOGIC:  No reports of sweating, cold or heat intolerance. No polyuria or polydipsia.    ALLERGIES:  No history of asthma, hives, eczema or rhinitis.  ______________________________________________________________________  PHYSICAL EXAM:  T(C): 37.2 (01-15-20 @ 08:00), Max: 38.1 (01-14-20 @ 18:30)  HR: 118 (01-15-20 @ 13:00)  BP: 103/53 (01-15-20 @ 13:00)  RR: 27 (01-15-20 @ 13:00)  SpO2: 89% (01-15-20 @ 13:00)  Wt(kg): --    01-14  -  01-15  --------------------------------------------------------  IN:    dexmedetomidine Infusion: 21.9 mL    dextrose 5% + sodium chloride 0.45%.: 750 mL    Enteral Tube Flush: 80 mL    Enteral Tube Flush: 1200 mL    Glucerna 1.5: 420 mL    norepinephrine Infusion: 42 mL    sodium chloride 0.45%: 500 mL    Solution: 1000 mL    Solution: 100 mL  Total IN: 4113.9 mL    OUT:    Indwelling Catheter - Urethral: 995 mL  Total OUT: 995 mL    Total NET: 3118.9 mL          GEN: Lethargic  CVS- S1 S2  ABD: soft nontender, non distended, bowel sounds+  LUNGS: clear to auscultation  NEURO: Non-responsive does not follow commands   Extremities: no cyanosis, no calf tenderness, +edema, no clubbing      ______________________________________________________________________  LABS:                        11.5   3.15  )-----------( 33       ( 15 Luiz 2020 12:32 )             36.3     01-15    144  |  107  |  76<H>  ----------------------------<  114<H>  5.2   |  28  |  1.49<H>    Ca    12.0<H>      15 Luiz 2020 06:27  Phos  3.0     01-15  Mg     2.2     01-15    TPro  5.7<L>  /  Alb  1.5<L>  /  TBili  8.2<H>  /  DBili  x   /  AST  344<H>  /  ALT  188<H>  /  AlkPhos  213<H>  01-15    LIVER FUNCTIONS - ( 15 Luiz 2020 06:27 )  Alb: 1.5 g/dL / Pro: 5.7 g/dL / ALK PHOS: 213 U/L / ALT: 188 U/L DA / AST: 344 U/L / GGT: x           ______________________________________________________________________  IMAGING:    ______________________________________________________________________  ASSESSMENT:  69y Male    PLAN:

## 2020-01-15 NOTE — PROGRESS NOTE ADULT - SUBJECTIVE AND OBJECTIVE BOX
Time of Visit:  Patient seen and examined.     MEDICATIONS  (STANDING):  atorvastatin 40 milliGRAM(s) Oral at bedtime  calcitonin Injectable 400 International Unit(s) IntraMuscular every 12 hours  cefepime   IVPB      cefepime   IVPB 2000 milliGRAM(s) IV Intermittent every 8 hours  chlorhexidine 0.12% Liquid 15 milliLiter(s) Oral Mucosa every 12 hours  chlorhexidine 4% Liquid 1 Application(s) Topical daily  dextrose 5% + sodium chloride 0.45%. 1000 milliLiter(s) (75 mL/Hr) IV Continuous <Continuous>  folic acid 1 milliGRAM(s) Oral daily  furosemide   Injectable 80 milliGRAM(s) IV Push once  insulin lispro (HumaLOG) corrective regimen sliding scale   SubCutaneous every 6 hours  metoclopramide 5 milliGRAM(s) Oral every 8 hours  midodrine 5 milliGRAM(s) Oral every 8 hours  multivitamin 1 Tablet(s) Oral daily  nystatin Powder 1 Application(s) Topical three times a day  pantoprazole   Suspension 40 milliGRAM(s) Enteral Tube daily  phenylephrine    Infusion 0.5 MICROgram(s)/kG/Min (23.438 mL/Hr) IV Continuous <Continuous>  predniSONE   Tablet 40 milliGRAM(s) Oral daily  QUEtiapine 25 milliGRAM(s) Oral every 12 hours      MEDICATIONS  (PRN):  fentaNYL    Injectable 25 MICROGram(s) IV Push every 4 hours PRN Severe Pain (7 - 10)  lactulose Syrup 30 Gram(s) Oral every 8 hours PRN Titrate upto 3 BMs  morphine  - Injectable 4 milliGRAM(s) IV Push every 4 hours PRN respiratory distress  sodium chloride 0.9% lock flush 10 milliLiter(s) IV Push every 1 hour PRN Pre/post blood products, medications, blood draw, and to maintain line patency       Medications up to date at time of exam.      PHYSICAL EXAMINATION:  Patient has no new complaints.  GENERAL: The patient is a well-developed, well-nourished, in no apparent distress.     Vital Signs Last 24 Hrs  T(C): 37.1 (15 Luiz 2020 14:00), Max: 38.1 (2020 18:30)  T(F): 98.7 (15 Luiz 2020 14:00), Max: 100.5 (2020 18:30)  HR: 124 (15 Luiz 2020 16:33) (109 - 153)  BP: 105/56 (15 Luiz 2020 15:00) (91/60 - 129/76)  BP(mean): 67 (15 Luiz 2020 15:00) (65 - 89)  RR: 28 (15 Luiz 2020 16:00) (16 - 39)  SpO2: 100% (15 Luiz 2020 16:33) (86% - 100%)  Mode: AC/ CMV (Assist Control/ Continuous Mandatory Ventilation)  RR (machine): 14  TV (machine): 500  FiO2: 40  PEEP: 5  ITime: 1  MAP: 11  PIP: 21   (if applicable)    Chest Tube (if applicable)    HEENT: Head is normocephalic and atraumatic. Extraocular muscles are intact. Mucous membranes are moist.  +ETT    NECK: Supple, no palpable adenopathy.    LUNGS: Clear to auscultation, no wheezing, rales, or rhonchi.    HEART: Regular rate and rhythm without murmur.    ABDOMEN: Soft, nontender, and nondistended.  No hepatosplenomegaly is noted.    : No painful voiding, no pelvic pain    EXTREMITIES: Without any cyanosis, clubbing, rash, lesions or edema.    NEUROLOGIC: not improving     SKIN: Warm, dry, good turgor.      LABS:                        11.5   3.15  )-----------( 33       ( 15 Luiz 2020 12:32 )             36.3     01-15    144  |  107  |  76<H>  ----------------------------<  114<H>  5.2   |  28  |  1.49<H>    Ca    12.0<H>      15 Luiz 2020 06:27  Phos  3.0     01-15  Mg     2.2     01-15    TPro  5.7<L>  /  Alb  1.5<L>  /  TBili  8.2<H>  /  DBili  x   /  AST  344<H>  /  ALT  188<H>  /  AlkPhos  213<H>  01-15    PT/INR - ( 15 Luiz 2020 09:07 )   PT: 16.0 sec;   INR: 1.43 ratio           Urinalysis Basic - ( 2020 05:54 )    Color: Red / Appearance: Slightly Turbid / S.020 / pH: x  Gluc: x / Ketone: Negative  / Bili: Moderate / Urobili: 4   Blood: x / Protein: 100 / Nitrite: Negative   Leuk Esterase: Small / RBC: >50 /HPF / WBC 0-2 /HPF   Sq Epi: x / Non Sq Epi: Negative /HPF / Bacteria: Trace /HPF      ABG - ( 15 Luiz 2020 06:15 )  pH, Arterial: 7.33  pH, Blood: x     /  pCO2: 50    /  pO2: 51    / HCO3: 26    / Base Excess: -0.1  /  SaO2: 82                CARDIAC MARKERS ( 2020 04:10 )  0.108 ng/mL / x     / 252 U/L / x     / 2.2 ng/mL  CARDIAC MARKERS ( 2020 21:51 )  0.123 ng/mL / x     / x     / x     / x                    MICROBIOLOGY: (if applicable)    RADIOLOGY & ADDITIONAL STUDIES:  EKG:   CXR:  ECHO:    IMPRESSION: 69y Male PAST MEDICAL & SURGICAL HISTORY:  HTN (hypertension)  Atrial fibrillation  No significant past surgical history   p/w         IMP: This is a 69 yr old man with  afib (on Pradaxa), HTN and alcohol abuse presents to the ED with chief complaint of weakness and shortness of breath. Patient is initially admitted to medical floor for CHF exacerbation, afib with RVR, alcohol withdrawal and sepsis 2/2 cellulitis. Patient is AAOx2-3 at baseline, noted lethargic by nurse this morning. Patient is on CIWA protocol and received total 8 mg of ativan Iv push in last 24 hours. RRT was called for respiratory distress and lethargy, patient is minimally arousable to sternal rub, not able to follow commands.  + ASP PNA on antibx. . intubated /. Pat is spiking high fevers, neg cultures and neg cxr... will hold antibx . Possible drug related fevers.    Pat requires multiple meds for sedation, will add seroquel after EKG    Pat completed course of antibx. + encephalopathy and SBT. Pat with prolong intubation. failing daily sbt .      Sugg  -will benefit from trach  -continue daily sbt  -duonebs  -hemodynamic support  -continue care as per icu team    case dismissed with icu team

## 2020-01-15 NOTE — PROGRESS NOTE ADULT - ATTENDING COMMENTS
Patient seen and examined with resident, Addendum to above.    69 yr old man with afib (on Pradaxa), HTN and alcohol abuse presents to the ED with chief complaint of weakness and shortness of breath. Patient is initially admitted to medical floor for CHF exacerbation, afib with RVR, alcohol withdrawal. RRT was called for respiratory distress and lethargy, patient was minimally arousable to sternal rub, not able to follow commands.  + ASP PNA on antibx. . intubated 1/5.     Assessment;  1. Acute respiratory failure  2. Alcohol withdrawal syndrome   3. Atrial fibrillation   4. Heart failure with preserved EF   5. Thrombocytopenia   6. Cirrhosis   7. REI with worsening creatinine  8. Hypercalcemia  9. Hematochezia    - Cont. mechanical ventilation  - No improvement in mentation despite holding all sedatives  - Bloody BM overnight, though hemoglobin is stable   - Given thrombocytopenia, will transfuse 1 unit platelet to > 50   - Encephalopathy likely multifactorial, given increased ammonia and hypercalcemia  - Overnight blood cultures obtained growning gram negative bacteremia  - D/c central line and arterial line  - Obtain CT scan of the head/Chest/Abdomen to evaluate for source of infection with out IV contrast   - Cont. lactulose  - Cont. tube feeds   - Restarted on antibiotics, dose per GFR  - Monitor urine output, though now with renal failure as well  - Persistent hypercalcemia, and now with hypernatremia  - IV lasix with continued free water supplementation  - Monitor BMP  - Work up for secondary hypercalcemia   - Check SPEP/UPEP given pancytopenia  - Nephrology follow up   - Cont. Calcitonin and Zometa   - Palliative care consult   - DVT prophylaxis with SCD, and stress ulcer prophylaxis  - Prognosis is guarded as patient with multi organ dysfunction. Underlying cirrhosis, now with renal failure, encephalopathy, respiratory failure. Minimal improvement in functional status. ?oncologic history per new information obtained from family.

## 2020-01-16 NOTE — PROGRESS NOTE ADULT - PROBLEM SELECTOR PROBLEM 3
Alcohol use
Alcohol withdrawal syndrome with complication
Septic shock
Abnormal LFTs (liver function tests)
COPD exacerbation
Encephalopathy

## 2020-01-16 NOTE — PROGRESS NOTE ADULT - ATTENDING COMMENTS
Patient seen and examined with resident, Addendum to above.    69 yr old man with afib (on Pradaxa), HTN and alcohol abuse presents to the ED with chief complaint of weakness and shortness of breath. Patient is initially admitted to medical floor for CHF exacerbation, afib with RVR, alcohol withdrawal. RRT was called for respiratory distress and lethargy, patient was minimally arousable to sternal rub, not able to follow commands.  + ASP PNA on antibx. . intubated 1/5.     Assessment;  1. Acute respiratory failure  2. Alcohol withdrawal syndrome   3. Atrial fibrillation   4. Heart failure with preserved EF   5. Thrombocytopenia   6. Cirrhosis   7. REI with worsening creatinine  8. Hypercalcemia  9. Hematochezia    - CT CHest/Abdomen and pelvis with large perihilar mass with possible mets to mediastinum and liver.   - CT head with out acute strokes, but unable to determine if possible underlying mets to the brain  - Now with worsening shock, renal dysfunction, skin failure.   - Cont. mechanical ventilation and titrate down Fio2 to maintain saturation > 90%  - No improvement in mentation despite holding all sedatives, possibly metabolic encephalopathy due to septic shock and hypercalcemia, vs. paraneoplastic syndrome related to malignancy.  - Hemoglobin is stable at this time   - Hematology consult recs appreciated  - Cont. lactulose  - Hold tube feeds   - Cont. antibiotics for now  - Cont. Calcitonin and Zometa   - Palliative care consult   - DVT prophylaxis with SCD, and stress ulcer prophylaxis  - Prognosis poor given multiple organ dysfunction. Discussed with surrogate who is the sister, and she expressed no further escalation of care. Family has made him DNR at this time. Will come to the hospital for possible withdrawal of care.

## 2020-01-16 NOTE — PROGRESS NOTE ADULT - REASON FOR ADMISSION
Acute hypercapnic respiratory failure.
CHF exacerbation, afib with RVR, alcohol withdrawal and sepsis 2/2 cellulitis. then had respiratory distress and now in ICU
PNA
SOB
arm pain
fever
resp failure
sob
Resp distress
sob
SOB and Weakness  Afib RVR
Elevated LFT's
Liver  issues
CHF exacerbation
CHF exacerbation

## 2020-01-16 NOTE — CONSULT NOTE ADULT - CONSULT REASON
68 y/o M with ETOH abuse, remains sedated/intubated. Lace12; on star list. Request to identify surrogate/goals of care.
ABNL LFTS
Resp failure/ Encephalopahty
Unresponsiveness
lethargy and respiratory distress
leukopenia and thrombocytopenia
sob
pt with hyponatremia

## 2020-01-16 NOTE — PROGRESS NOTE ADULT - PROBLEM SELECTOR PLAN 1
2/2 shock due to PNA; comorbid metastatic disease; involving lungs (patient intubated); heart (on pressor); kidneys (BUN/Cr: 95/2.15); liver (AST: 344/ALT: 188); skin (multiple issac terminal ulcers). Patient remains off sedation but unresponsive; continues IV abx and pressor support. Spoke with patient's sister/Juliann on the phone - they are now requesting DNR / DNI / no escalation of care. They are leaning toward withdrawal of care and will arrive around 2PM this afternoon.

## 2020-01-16 NOTE — CONSULT NOTE ADULT - PROBLEM SELECTOR RECOMMENDATION 3
Continue diuresis
can be from alcohol intoxication and sepsis.  but it is 4 now, no intervention
Patient reportedly drinks at least 1 bottle wine and a shot of scotch each day. Patient remains sedated/intubated. Continues CIWA protocol; ativan changed to phenobarbital PRN; continues Precedex, thiamine and Folate supplementation.

## 2020-01-16 NOTE — PROGRESS NOTE ADULT - SUBJECTIVE AND OBJECTIVE BOX
Note is incomplete  pt seen and examined.pts current chart reviewed and case discussed with resident covering.    SUBJECTIVE:  pt feels fine and denies cp,sob,gi or gu/uremic  symptons    REVIEW OF SYSTEMS:  CONSTITUTIONAL: No weakness, fevers or chills  EYES/ENT: No visual changes;  No vertigo or throat pain   NECK: No pain or stiffness  RESPIRATORY: No cough, wheezing, hemoptysis; No shortness of breath  CARDIOVASCULAR: No chest pain or palpitations  GASTROINTESTINAL: No abdominal or epigastric pain. No nausea, vomiting, or hematemesis; No diarrhea or constipation. No melena or hematochezia.  GENITOURINARY: No dysuria, frequency , hematuria, flank pain or nocturia  NEUROLOGICAL: No numbness or weakness  SKIN: No itching, burning, rashes, or lesions   All other review of systems is negative unless indicated above    Current meds:    atorvastatin 40 milliGRAM(s) Oral at bedtime  cefepime   IVPB      cefepime   IVPB 2000 milliGRAM(s) IV Intermittent every 8 hours  chlorhexidine 0.12% Liquid 15 milliLiter(s) Oral Mucosa two times a day  chlorhexidine 0.12% Liquid 15 milliLiter(s) Oral Mucosa every 12 hours  dextrose 5% + sodium chloride 0.45%. 1000 milliLiter(s) IV Continuous <Continuous>  fentaNYL    Injectable 25 MICROGram(s) IV Push every 4 hours PRN  fentaNYL   Infusion 1 MICROgram(s)/kG/Hr IV Continuous <Continuous>  folic acid 1 milliGRAM(s) Oral daily  insulin lispro (HumaLOG) corrective regimen sliding scale   SubCutaneous every 6 hours  lactulose Syrup 30 Gram(s) Oral every 8 hours PRN  metoclopramide 5 milliGRAM(s) Oral every 8 hours  metoclopramide Injectable 10 milliGRAM(s) IV Push every 6 hours  midodrine 5 milliGRAM(s) Oral every 8 hours  morphine  - Injectable 4 milliGRAM(s) IV Push every 4 hours PRN  multivitamin 1 Tablet(s) Oral daily  nystatin Powder 1 Application(s) Topical three times a day  pantoprazole   Suspension 40 milliGRAM(s) Enteral Tube daily  pantoprazole  Injectable 40 milliGRAM(s) IV Push two times a day  phenylephrine    Infusion 2.7 MICROgram(s)/kG/Min IV Continuous <Continuous>  predniSONE   Tablet 40 milliGRAM(s) Oral daily  QUEtiapine 25 milliGRAM(s) Oral every 12 hours  sodium chloride 0.9% lock flush 10 milliLiter(s) IV Push every 1 hour PRN  sodium zirconium cyclosilicate 5 Gram(s) Oral every 8 hours      Vital Signs    T(F): 98 (20 @ 07:00), Max: 99.4 (01-15-20 @ 21:00)  HR: 111 (20 @ 13:00) (92 - 139)  BP: 71/32 (20 @ 13:00) (64/52 - 131/113)  ABP: 105/56 (01-15-20 @ 16:00) (105/56 - 105/56)  RR: 35 (20 @ 13:00) (15 - 39)  SpO2: 97% (20 @ 13:00) (85% - 100%)  Wt(kg): --  CVP(cm H2O): --  CO: --  PCWP: --    I and O's:     @ 07:01  -  01-15 @ 07:00  --------------------------------------------------------  IN:    dexmedetomidine Infusion: 21.9 mL    dextrose 5% + sodium chloride 0.45%.: 750 mL    Enteral Tube Flush: 1200 mL    Enteral Tube Flush: 80 mL    Glucerna 1.5: 420 mL    norepinephrine Infusion: 42 mL    sodium chloride 0.45%: 500 mL    Solution: 1000 mL    Solution: 100 mL  Total IN: 4113.9 mL    OUT:    Indwelling Catheter - Urethral: 995 mL  Total OUT: 995 mL    Total NET: 3118.9 mL      01-15 @ 07: @ 07:00  --------------------------------------------------------  IN:    dextrose 5% + sodium chloride 0.45%.: 825 mL    Enteral Tube Flush: 150 mL    phenylephrine   Infusion: 506.2 mL    Sodium Chloride 0.9% IV Bolus: 1000 mL    Solution: 200 mL  Total IN: 2681.2 mL    OUT:    Emesis: 900 mL    Indwelling Catheter - Urethral: 250 mL    Nasoenteral Tube: 200 mL  Total OUT: 1350 mL    Total NET: 1331.2 mL       @ 07:01  -   @ 13:34  --------------------------------------------------------  IN:    Enteral Tube Flush: 250 mL    Enteral Tube Flush: 150 mL    phenylephrine   Infusion: 63.3 mL    phenylephrine   Infusion: 325 mL  Total IN: 788.3 mL    OUT:    Indwelling Catheter - Urethral: 45 mL    Nasoenteral Tube: 460 mL  Total OUT: 505 mL    Total NET: 283.3 mL        Daily     Daily Weight in k.8 (2020 07:00)    PHYSICAL EXAM:  Constitutional: well developed, well nourished  and in nad  HEENT: PERRLA,  no icteric sclera and mild pallor of conjunctiva noted  Neck: No JVD, thyromegaly or adenopathy  Respiratory: reduced air entry lower lungs with no rales, wheezing or rhonchi  Cardiovascular: S1 and S2 normally heard  Gastrointestinal: soft, nondistended, nontender and normal bowel sounds heard  Extremities: No peripheral edema or cyanosis  Neurological: A/O x 3, no focal deficits  : No flank or cva tenderness palpated.  Skin: No rashes      LABS:    CBC:                          11.6   4.97  )-----------(        ( 2020 04:19 )             36.5           BMP:        142  |  109<H>  |  95<H>  ----------------------------<  89  6.0<H>   |  23  |  2.15<H>  01-15    144  |  108  |  90<H>  ----------------------------<  93  5.9<H>   |  27  |  2.02<H>  01-15    146<H>  |  112<H>  |  90<H>  ----------------------------<  93  6.4<HH>   |  25  |  1.97<H>  01-15    146<H>  |  111<H>  |  82<H>  ----------------------------<  130<H>  5.7<H>   |  28  |  1.90<H>  01-15    144  |  107  |  76<H>  ----------------------------<  114<H>  5.2   |  28  |  1.49<H>  -    143  |  107  |  71<H>  ----------------------------<  148<H>  5.2   |  28  |  1.40<H>  -14    150<H>  |  112<H>  |  62<H>  ----------------------------<  113<H>  5.6<H>   |  29  |  1.10  -    146<H>  |  110<H>  |  62<H>  ----------------------------<  133<H>  5.4<H>   |  30  |  1.08  -    151<H>  |  115<H>  |  64<H>  ----------------------------<  133<H>  5.6<H>   |  31  |  1.10  -    152<H>  |  117<H>  |  66<H>  ----------------------------<  135<H>  5.8<H>   |  30  |  1.02    Ca    11.3<H>      2020 04:19  Ca    11.3<H>      15 2020 21:58  Ca    11.6<H>      15 Luiz 2020 21:27  Ca    11.8<H>      15 Luiz 2020 16:48  Ca    12.0<H>      15 Luiz 2020 06:27  Ca    12.5<H>      2020 23:20  Ca    12.3<H>      2020 15:53  Ca    12.3<H>      2020 04:10  Ca    11.8<H>      2020 21:51  Ca    11.9<H>      2020 16:00  Phos  3.9       Phos  3.0     01-15  Phos  2.7       Mg     2.4       Mg     2.2     01-15  Mg     2.4         TPro  5.7<L>  /  Alb  1.5<L>  /  TBili  8.2<H>  /  DBili  x   /  AST  344<H>  /  ALT  188<H>  /  AlkPhos  213<H>  01-15  TPro  5.8<L>  /  Alb  1.6<L>  /  TBili  7.6<H>  /  DBili  x   /  AST  345<H>  /  ALT  193<H>  /  AlkPhos  217<H>    TPro  5.1<L>  /  Alb  2.3<L>  /  TBili  x   /  DBili  x   /  AST  x   /  ALT  x   /  AlkPhos  x     TPro  5.7<L>  /  Alb  1.6<L>  /  TBili  7.2<H>  /  DBili  x   /  AST  279<H>  /  ALT  173<H>  /  AlkPhos  212<H>    TPro  5.4<L>  /  Alb  1.5<L>  /  TBili  6.1<H>  /  DBili  x   /  AST  248<H>  /  ALT  170<H>  /  AlkPhos  203<H>    TPro  5.8<L>  /  Alb  1.6<L>  /  TBili  6.5<H>  /  DBili  x   /  AST  264<H>  /  ALT  176<H>  /  AlkPhos  216<H>        Vitamin D, 1, 25-Dihydroxy: 19.0 pg/mL ( @ 00:01)  Intact PTH: 14 pg/mL ( @ 20:07)  Vitamin D, 25-Hydroxy: 26.8 ng/mL (01-13 @ 19:25)      URINE STUDIES:        Eosinophil Count, Urine: Negative (01-10 @ 09:20)                  RADIOLOGY & ADDITIONAL STUDIES: FINAL NOTE    Pt is not doing well with worsening multi-organ failure.pts family wants comfort care only  we will sign off

## 2020-01-16 NOTE — CONSULT NOTE ADULT - PROBLEM SELECTOR RECOMMENDATION 5
Continue to follow and likely multifactorial as above. Send AFP level
alcoholic hepatitis  ?mets in liver
Spoke with patient's sister/Juliann ([phone] / [phone]) on the phone; Dr. Wagoner present. Discussed status. She reports patient is not , no children, parents ; has 5 living sisters and 1 brother. She reports she has been patient's emergency contact "for years" and has been identified as the primary surrogate. Patient's 2nd sister/Radha Early ([phone]) is alternate. At this time, patient remains a full code. Will continue to follow. All questions answered; supportive counseling provided.

## 2020-01-16 NOTE — PROGRESS NOTE ADULT - SUBJECTIVE AND OBJECTIVE BOX
INTERVAL HPI/OVERNIGHT EVENTS: ***    PRESSORS: [ ] YES [ ] NO  WHICH:    ANTIBIOTICS:                  DATE STARTED:  ANTIBIOTICS:                  DATE STARTED:  ANTIBIOTICS:                  DATE STARTED:    Antimicrobial:  cefepime   IVPB      cefepime   IVPB 2000 milliGRAM(s) IV Intermittent every 8 hours    Cardiovascular:  midodrine 5 milliGRAM(s) Oral every 8 hours  phenylephrine    Infusion 0.5 MICROgram(s)/kG/Min IV Continuous <Continuous>    Pulmonary:    Hematalogic:    Other:  atorvastatin 40 milliGRAM(s) Oral at bedtime  chlorhexidine 0.12% Liquid 15 milliLiter(s) Oral Mucosa two times a day  dextrose 5% + sodium chloride 0.45%. 1000 milliLiter(s) IV Continuous <Continuous>  fentaNYL    Injectable 25 MICROGram(s) IV Push every 4 hours PRN  folic acid 1 milliGRAM(s) Oral daily  insulin lispro (HumaLOG) corrective regimen sliding scale   SubCutaneous every 6 hours  lactulose Syrup 30 Gram(s) Oral every 8 hours PRN  metoclopramide 5 milliGRAM(s) Oral every 8 hours  metoclopramide Injectable 10 milliGRAM(s) IV Push every 6 hours  morphine  - Injectable 4 milliGRAM(s) IV Push every 4 hours PRN  multivitamin 1 Tablet(s) Oral daily  nystatin Powder 1 Application(s) Topical three times a day  pantoprazole   Suspension 40 milliGRAM(s) Enteral Tube daily  pantoprazole  Injectable 40 milliGRAM(s) IV Push two times a day  predniSONE   Tablet 40 milliGRAM(s) Oral daily  QUEtiapine 25 milliGRAM(s) Oral every 12 hours  sodium chloride 0.9% lock flush 10 milliLiter(s) IV Push every 1 hour PRN  sodium zirconium cyclosilicate 5 Gram(s) Oral every 8 hours    atorvastatin 40 milliGRAM(s) Oral at bedtime  cefepime   IVPB      cefepime   IVPB 2000 milliGRAM(s) IV Intermittent every 8 hours  chlorhexidine 0.12% Liquid 15 milliLiter(s) Oral Mucosa two times a day  dextrose 5% + sodium chloride 0.45%. 1000 milliLiter(s) IV Continuous <Continuous>  fentaNYL    Injectable 25 MICROGram(s) IV Push every 4 hours PRN  folic acid 1 milliGRAM(s) Oral daily  insulin lispro (HumaLOG) corrective regimen sliding scale   SubCutaneous every 6 hours  lactulose Syrup 30 Gram(s) Oral every 8 hours PRN  metoclopramide 5 milliGRAM(s) Oral every 8 hours  metoclopramide Injectable 10 milliGRAM(s) IV Push every 6 hours  midodrine 5 milliGRAM(s) Oral every 8 hours  morphine  - Injectable 4 milliGRAM(s) IV Push every 4 hours PRN  multivitamin 1 Tablet(s) Oral daily  nystatin Powder 1 Application(s) Topical three times a day  pantoprazole   Suspension 40 milliGRAM(s) Enteral Tube daily  pantoprazole  Injectable 40 milliGRAM(s) IV Push two times a day  phenylephrine    Infusion 0.5 MICROgram(s)/kG/Min IV Continuous <Continuous>  predniSONE   Tablet 40 milliGRAM(s) Oral daily  QUEtiapine 25 milliGRAM(s) Oral every 12 hours  sodium chloride 0.9% lock flush 10 milliLiter(s) IV Push every 1 hour PRN  sodium zirconium cyclosilicate 5 Gram(s) Oral every 8 hours    Drug Dosing Weight  Height (cm): 180.34 (03 Jan 2020 15:39)  Weight (kg): 125 (05 Jan 2020 11:30)  BMI (kg/m2): 38.4 (05 Jan 2020 11:30)  BSA (m2): 2.42 (05 Jan 2020 11:30)    CENTRAL LINE: [ ] YES [ ] NO  LOCATION:         VILLATORO: [ ] YES [ ] NO          A-LINE:  [ ] YES [ ] NO  LOCATION:             ICU Vital Signs Last 24 Hrs  T(C): 36.7 (16 Jan 2020 07:00), Max: 37.4 (15 Luiz 2020 21:00)  T(F): 98 (16 Jan 2020 07:00), Max: 99.4 (15 Luiz 2020 21:00)  HR: 122 (16 Jan 2020 07:44) (106 - 139)  BP: 102/81 (16 Jan 2020 07:32) (64/52 - 125/76)  BP(mean): 84 (16 Jan 2020 07:32) (33 - 94)  ABP: 105/56 (15 Luiz 2020 16:00) (103/63 - 105/56)  ABP(mean): 75 (15 Luiz 2020 09:00) (75 - 75)  RR: 25 (16 Jan 2020 07:32) (15 - 39)  SpO2: 98% (16 Jan 2020 07:44) (86% - 100%)      ABG - ( 16 Jan 2020 04:21 )  pH, Arterial: 7.30  pH, Blood: x     /  pCO2: 48    /  pO2: 95    / HCO3: 23    / Base Excess: -3.3  /  SaO2: 96                    01-15 @ 07:01  -  01-16 @ 07:00  --------------------------------------------------------  IN: 2681.2 mL / OUT: 1350 mL / NET: 1331.2 mL        Mode: AC/ CMV (Assist Control/ Continuous Mandatory Ventilation)  RR (machine): 12  TV (machine): 500  FiO2: 60  PEEP: 5  ITime: 1  MAP: 14  PIP: 32      REVIEW OF SYSTEMS:    CONSTITUTIONAL: No weakness, fevers or chills  NECK: No pain or stiffness  RESPIRATORY: No cough, wheezing, hemoptysis; No shortness of breath  CARDIOVASCULAR: No chest pain or palpitations  GASTROINTESTINAL: No abdominal or epigastric pain. No nausea, vomiting, No diarrhea or constipation. No melena or hematochezia.  GENITOURINARY: No dysuria, frequency or hematuria  NEUROLOGICAL: No numbness or weakness  All other review of systems is negative unless indicated above      PHYSICAL EXAM:    GENERAL: NAD, well-groomed, well-developed  EYES: EOMI, PERRLA,   NECK: Supple, No JVD; Normal thyroid; Trachea midline  NERVOUS SYSTEM:  Alert & Oriented X3,  Motor Strength 5/5 B/L upper and lower extremities; DTRs 2+ intact and symmetric  CHEST/LUNG: No rales, rhonchi, wheezing   HEART: Regular rate and rhythm; No murmurs,   ABDOMEN: Soft, Nontender, Nondistended; Bowel sounds present  EXTREMITIES:  2+ Peripheral Pulses, No clubbing, cyanosis, or edema        LABS:  CBC Full  -  ( 16 Jan 2020 04:19 )  WBC Count : 4.97 K/uL  RBC Count : 3.59 M/uL  Hemoglobin : 11.6 g/dL  Hematocrit : 36.5 %  Platelet Count - Automated : 25 K/uL  Mean Cell Volume : 101.7 fl  Mean Cell Hemoglobin : 32.3 pg  Mean Cell Hemoglobin Concentration : 31.8 gm/dL  Auto Neutrophil # : x  Auto Lymphocyte # : x  Auto Monocyte # : x  Auto Eosinophil # : x  Auto Basophil # : x  Auto Neutrophil % : x  Auto Lymphocyte % : x  Auto Monocyte % : x  Auto Eosinophil % : x  Auto Basophil % : x    01-16    142  |  109<H>  |  95<H>  ----------------------------<  89  6.0<H>   |  23  |  2.15<H>    Ca    11.3<H>      16 Jan 2020 04:19  Phos  3.9     01-16  Mg     2.4     01-16    TPro  5.7<L>  /  Alb  1.5<L>  /  TBili  8.2<H>  /  DBili  x   /  AST  344<H>  /  ALT  188<H>  /  AlkPhos  213<H>  01-15    PT/INR - ( 15 Luiz 2020 09:07 )   PT: 16.0 sec;   INR: 1.43 ratio             Culture Results:   Growth in aerobic bottle: Gram Negative Rods  Growth in anaerobic bottle: Gram Negative Rods  ***Blood Panel PCR results on this specimen are available  approximately 3 hours after the Gram stain result.***  Gram stain, PCR, and/or culture results may not always  correspond due to difference in methodologies.  ************************************************************  This PCR assay was performed using [employer].  The following targets are tested for: Enterococcus,  vancomycin resistant enterococci, Listeria monocytogenes,  coagulase negative staphylococci, S. aureus,  methicillin resistant S. aureus, Streptococcus agalactiae  (Group B), S. pneumoniae, S. pyogenes (Group A),  Acinetobacter baumannii, Enterobacter cloacae, E. coli,  Klebsiella oxytoca, K. pneumoniae, Proteus sp.,  Serratia marcescens, Haemophilus influenzae,  Neisseria meningitidis, Pseudomonas aeruginosa, Candida  albicans, C. glabrata, C krusei, C parapsilosis,  C. tropicalis and the KPC resistance gene. (01-15 @ 01:22)      RADIOLOGY & ADDITIONAL STUDIES REVIEWED:  ***    [ ]GOALS OF CARE DISCUSSION WITH PATIENT/FAMILY/PROXY:    CRITICAL CARE TIME SPENT: 35 minutes INTERVAL HPI/OVERNIGHT EVENTS: Pt condition continues to deteriorate. Spoken to family to explain the poor prognosis. Family agreed  on No escalation of care and just comfort measures. No further labs. He is CAP on pressor PE @    PRESSORS: [ ] YES [ ] NO  WHICH:    ANTIBIOTICS:                  DATE STARTED:  ANTIBIOTICS:                  DATE STARTED:  ANTIBIOTICS:                  DATE STARTED:    Antimicrobial:  cefepime   IVPB      cefepime   IVPB 2000 milliGRAM(s) IV Intermittent every 8 hours    Cardiovascular:  midodrine 5 milliGRAM(s) Oral every 8 hours  phenylephrine    Infusion 0.5 MICROgram(s)/kG/Min IV Continuous <Continuous>    Pulmonary:    Hematalogic:    Other:  atorvastatin 40 milliGRAM(s) Oral at bedtime  chlorhexidine 0.12% Liquid 15 milliLiter(s) Oral Mucosa two times a day  dextrose 5% + sodium chloride 0.45%. 1000 milliLiter(s) IV Continuous <Continuous>  fentaNYL    Injectable 25 MICROGram(s) IV Push every 4 hours PRN  folic acid 1 milliGRAM(s) Oral daily  insulin lispro (HumaLOG) corrective regimen sliding scale   SubCutaneous every 6 hours  lactulose Syrup 30 Gram(s) Oral every 8 hours PRN  metoclopramide 5 milliGRAM(s) Oral every 8 hours  metoclopramide Injectable 10 milliGRAM(s) IV Push every 6 hours  morphine  - Injectable 4 milliGRAM(s) IV Push every 4 hours PRN  multivitamin 1 Tablet(s) Oral daily  nystatin Powder 1 Application(s) Topical three times a day  pantoprazole   Suspension 40 milliGRAM(s) Enteral Tube daily  pantoprazole  Injectable 40 milliGRAM(s) IV Push two times a day  predniSONE   Tablet 40 milliGRAM(s) Oral daily  QUEtiapine 25 milliGRAM(s) Oral every 12 hours  sodium chloride 0.9% lock flush 10 milliLiter(s) IV Push every 1 hour PRN  sodium zirconium cyclosilicate 5 Gram(s) Oral every 8 hours    atorvastatin 40 milliGRAM(s) Oral at bedtime  cefepime   IVPB      cefepime   IVPB 2000 milliGRAM(s) IV Intermittent every 8 hours  chlorhexidine 0.12% Liquid 15 milliLiter(s) Oral Mucosa two times a day  dextrose 5% + sodium chloride 0.45%. 1000 milliLiter(s) IV Continuous <Continuous>  fentaNYL    Injectable 25 MICROGram(s) IV Push every 4 hours PRN  folic acid 1 milliGRAM(s) Oral daily  insulin lispro (HumaLOG) corrective regimen sliding scale   SubCutaneous every 6 hours  lactulose Syrup 30 Gram(s) Oral every 8 hours PRN  metoclopramide 5 milliGRAM(s) Oral every 8 hours  metoclopramide Injectable 10 milliGRAM(s) IV Push every 6 hours  midodrine 5 milliGRAM(s) Oral every 8 hours  morphine  - Injectable 4 milliGRAM(s) IV Push every 4 hours PRN  multivitamin 1 Tablet(s) Oral daily  nystatin Powder 1 Application(s) Topical three times a day  pantoprazole   Suspension 40 milliGRAM(s) Enteral Tube daily  pantoprazole  Injectable 40 milliGRAM(s) IV Push two times a day  phenylephrine    Infusion 0.5 MICROgram(s)/kG/Min IV Continuous <Continuous>  predniSONE   Tablet 40 milliGRAM(s) Oral daily  QUEtiapine 25 milliGRAM(s) Oral every 12 hours  sodium chloride 0.9% lock flush 10 milliLiter(s) IV Push every 1 hour PRN  sodium zirconium cyclosilicate 5 Gram(s) Oral every 8 hours    Drug Dosing Weight  Height (cm): 180.34 (03 Jan 2020 15:39)  Weight (kg): 125 (05 Jan 2020 11:30)  BMI (kg/m2): 38.4 (05 Jan 2020 11:30)  BSA (m2): 2.42 (05 Jan 2020 11:30)    CENTRAL LINE: [ ] YES [ ] NO  LOCATION:         VILLATORO: [ ] YES [ ] NO          A-LINE:  [ ] YES [ ] NO  LOCATION:             ICU Vital Signs Last 24 Hrs  T(C): 36.7 (16 Jan 2020 07:00), Max: 37.4 (15 Luiz 2020 21:00)  T(F): 98 (16 Jan 2020 07:00), Max: 99.4 (15 Luiz 2020 21:00)  HR: 122 (16 Jan 2020 07:44) (106 - 139)  BP: 102/81 (16 Jan 2020 07:32) (64/52 - 125/76)  BP(mean): 84 (16 Jan 2020 07:32) (33 - 94)  ABP: 105/56 (15 Luiz 2020 16:00) (103/63 - 105/56)  ABP(mean): 75 (15 Luiz 2020 09:00) (75 - 75)  RR: 25 (16 Jan 2020 07:32) (15 - 39)  SpO2: 98% (16 Jan 2020 07:44) (86% - 100%)      ABG - ( 16 Jan 2020 04:21 )  pH, Arterial: 7.30  pH, Blood: x     /  pCO2: 48    /  pO2: 95    / HCO3: 23    / Base Excess: -3.3  /  SaO2: 96                    01-15 @ 07:01  -  01-16 @ 07:00  --------------------------------------------------------  IN: 2681.2 mL / OUT: 1350 mL / NET: 1331.2 mL        Mode: AC/ CMV (Assist Control/ Continuous Mandatory Ventilation)  RR (machine): 12  TV (machine): 500  FiO2: 60  PEEP: 5  ITime: 1  MAP: 14  PIP: 32      REVIEW OF SYSTEMS:    CONSTITUTIONAL: No weakness, fevers or chills  NECK: No pain or stiffness  RESPIRATORY: No cough, wheezing, hemoptysis; No shortness of breath  CARDIOVASCULAR: No chest pain or palpitations  GASTROINTESTINAL: No abdominal or epigastric pain. No nausea, vomiting, No diarrhea or constipation. No melena or hematochezia.  GENITOURINARY: No dysuria, frequency or hematuria  NEUROLOGICAL: No numbness or weakness  All other review of systems is negative unless indicated above      PHYSICAL EXAM:    GENERAL: NAD, well-groomed, well-developed  EYES: EOMI, PERRLA,   NECK: Supple, No JVD; Normal thyroid; Trachea midline  NERVOUS SYSTEM:  Alert & Oriented X3,  Motor Strength 5/5 B/L upper and lower extremities; DTRs 2+ intact and symmetric  CHEST/LUNG: No rales, rhonchi, wheezing   HEART: Regular rate and rhythm; No murmurs,   ABDOMEN: Soft, Nontender, Nondistended; Bowel sounds present  EXTREMITIES:  2+ Peripheral Pulses, No clubbing, cyanosis, or edema        LABS:  CBC Full  -  ( 16 Jan 2020 04:19 )  WBC Count : 4.97 K/uL  RBC Count : 3.59 M/uL  Hemoglobin : 11.6 g/dL  Hematocrit : 36.5 %  Platelet Count - Automated : 25 K/uL  Mean Cell Volume : 101.7 fl  Mean Cell Hemoglobin : 32.3 pg  Mean Cell Hemoglobin Concentration : 31.8 gm/dL  Auto Neutrophil # : x  Auto Lymphocyte # : x  Auto Monocyte # : x  Auto Eosinophil # : x  Auto Basophil # : x  Auto Neutrophil % : x  Auto Lymphocyte % : x  Auto Monocyte % : x  Auto Eosinophil % : x  Auto Basophil % : x    01-16    142  |  109<H>  |  95<H>  ----------------------------<  89  6.0<H>   |  23  |  2.15<H>    Ca    11.3<H>      16 Jan 2020 04:19  Phos  3.9     01-16  Mg     2.4     01-16    TPro  5.7<L>  /  Alb  1.5<L>  /  TBili  8.2<H>  /  DBili  x   /  AST  344<H>  /  ALT  188<H>  /  AlkPhos  213<H>  01-15    PT/INR - ( 15 Luiz 2020 09:07 )   PT: 16.0 sec;   INR: 1.43 ratio             Culture Results:   Growth in aerobic bottle: Gram Negative Rods  Growth in anaerobic bottle: Gram Negative Rods  ***Blood Panel PCR results on this specimen are available  approximately 3 hours after the Gram stain result.***  Gram stain, PCR, and/or culture results may not always  correspond due to difference in methodologies.  ************************************************************  This PCR assay was performed using [employer].  The following targets are tested for: Enterococcus,  vancomycin resistant enterococci, Listeria monocytogenes,  coagulase negative staphylococci, S. aureus,  methicillin resistant S. aureus, Streptococcus agalactiae  (Group B), S. pneumoniae, S. pyogenes (Group A),  Acinetobacter baumannii, Enterobacter cloacae, E. coli,  Klebsiella oxytoca, K. pneumoniae, Proteus sp.,  Serratia marcescens, Haemophilus influenzae,  Neisseria meningitidis, Pseudomonas aeruginosa, Candida  albicans, C. glabrata, C krusei, C parapsilosis,  C. tropicalis and the KPC resistance gene. (01-15 @ 01:22)      RADIOLOGY & ADDITIONAL STUDIES REVIEWED:  ***    [ ]GOALS OF CARE DISCUSSION WITH PATIENT/FAMILY/PROXY:    CRITICAL CARE TIME SPENT: 35 minutes INTERVAL HPI/OVERNIGHT EVENTS: Pt condition continues to deteriorate. Spoken to family to explain the poor prognosis. Family agreed  on No escalation of care and just comfort measures. No further labs. He is CAP on pressor PE at fixed max dose    PRESSORS: [ ] YES [ ] NO  WHICH:    ANTIBIOTICS:                  DATE STARTED:  ANTIBIOTICS:                  DATE STARTED:  ANTIBIOTICS:                  DATE STARTED:    Antimicrobial:  cefepime   IVPB      cefepime   IVPB 2000 milliGRAM(s) IV Intermittent every 8 hours    Cardiovascular:  midodrine 5 milliGRAM(s) Oral every 8 hours  phenylephrine    Infusion 0.5 MICROgram(s)/kG/Min IV Continuous <Continuous>    Pulmonary:    Hematalogic:    Other:  atorvastatin 40 milliGRAM(s) Oral at bedtime  chlorhexidine 0.12% Liquid 15 milliLiter(s) Oral Mucosa two times a day  dextrose 5% + sodium chloride 0.45%. 1000 milliLiter(s) IV Continuous <Continuous>  fentaNYL    Injectable 25 MICROGram(s) IV Push every 4 hours PRN  folic acid 1 milliGRAM(s) Oral daily  insulin lispro (HumaLOG) corrective regimen sliding scale   SubCutaneous every 6 hours  lactulose Syrup 30 Gram(s) Oral every 8 hours PRN  metoclopramide 5 milliGRAM(s) Oral every 8 hours  metoclopramide Injectable 10 milliGRAM(s) IV Push every 6 hours  morphine  - Injectable 4 milliGRAM(s) IV Push every 4 hours PRN  multivitamin 1 Tablet(s) Oral daily  nystatin Powder 1 Application(s) Topical three times a day  pantoprazole   Suspension 40 milliGRAM(s) Enteral Tube daily  pantoprazole  Injectable 40 milliGRAM(s) IV Push two times a day  predniSONE   Tablet 40 milliGRAM(s) Oral daily  QUEtiapine 25 milliGRAM(s) Oral every 12 hours  sodium chloride 0.9% lock flush 10 milliLiter(s) IV Push every 1 hour PRN  sodium zirconium cyclosilicate 5 Gram(s) Oral every 8 hours    atorvastatin 40 milliGRAM(s) Oral at bedtime  cefepime   IVPB      cefepime   IVPB 2000 milliGRAM(s) IV Intermittent every 8 hours  chlorhexidine 0.12% Liquid 15 milliLiter(s) Oral Mucosa two times a day  dextrose 5% + sodium chloride 0.45%. 1000 milliLiter(s) IV Continuous <Continuous>  fentaNYL    Injectable 25 MICROGram(s) IV Push every 4 hours PRN  folic acid 1 milliGRAM(s) Oral daily  insulin lispro (HumaLOG) corrective regimen sliding scale   SubCutaneous every 6 hours  lactulose Syrup 30 Gram(s) Oral every 8 hours PRN  metoclopramide 5 milliGRAM(s) Oral every 8 hours  metoclopramide Injectable 10 milliGRAM(s) IV Push every 6 hours  midodrine 5 milliGRAM(s) Oral every 8 hours  morphine  - Injectable 4 milliGRAM(s) IV Push every 4 hours PRN  multivitamin 1 Tablet(s) Oral daily  nystatin Powder 1 Application(s) Topical three times a day  pantoprazole   Suspension 40 milliGRAM(s) Enteral Tube daily  pantoprazole  Injectable 40 milliGRAM(s) IV Push two times a day  phenylephrine    Infusion 0.5 MICROgram(s)/kG/Min IV Continuous <Continuous>  predniSONE   Tablet 40 milliGRAM(s) Oral daily  QUEtiapine 25 milliGRAM(s) Oral every 12 hours  sodium chloride 0.9% lock flush 10 milliLiter(s) IV Push every 1 hour PRN  sodium zirconium cyclosilicate 5 Gram(s) Oral every 8 hours    Drug Dosing Weight  Height (cm): 180.34 (03 Jan 2020 15:39)  Weight (kg): 125 (05 Jan 2020 11:30)  BMI (kg/m2): 38.4 (05 Jan 2020 11:30)  BSA (m2): 2.42 (05 Jan 2020 11:30)    CENTRAL LINE: [ ] YES [ ] NO  LOCATION:         VILLATORO: [ ] YES [ ] NO          A-LINE:  [ ] YES [ ] NO  LOCATION:             ICU Vital Signs Last 24 Hrs  T(C): 36.7 (16 Jan 2020 07:00), Max: 37.4 (15 Luiz 2020 21:00)  T(F): 98 (16 Jan 2020 07:00), Max: 99.4 (15 Luiz 2020 21:00)  HR: 122 (16 Jan 2020 07:44) (106 - 139)  BP: 102/81 (16 Jan 2020 07:32) (64/52 - 125/76)  BP(mean): 84 (16 Jan 2020 07:32) (33 - 94)  ABP: 105/56 (15 Luiz 2020 16:00) (103/63 - 105/56)  ABP(mean): 75 (15 Luiz 2020 09:00) (75 - 75)  RR: 25 (16 Jan 2020 07:32) (15 - 39)  SpO2: 98% (16 Jan 2020 07:44) (86% - 100%)      ABG - ( 16 Jan 2020 04:21 )  pH, Arterial: 7.30  pH, Blood: x     /  pCO2: 48    /  pO2: 95    / HCO3: 23    / Base Excess: -3.3  /  SaO2: 96                    01-15 @ 07:01  -  01-16 @ 07:00  --------------------------------------------------------  IN: 2681.2 mL / OUT: 1350 mL / NET: 1331.2 mL        Mode: AC/ CMV (Assist Control/ Continuous Mandatory Ventilation)  RR (machine): 12  TV (machine): 500  FiO2: 60  PEEP: 5  ITime: 1  MAP: 14  PIP: 32      REVIEW OF SYSTEMS:    CONSTITUTIONAL: No weakness, fevers or chills  NECK: No pain or stiffness  RESPIRATORY: No cough, wheezing, hemoptysis; No shortness of breath  CARDIOVASCULAR: No chest pain or palpitations  GASTROINTESTINAL: No abdominal or epigastric pain. No nausea, vomiting, No diarrhea or constipation. No melena or hematochezia.  GENITOURINARY: No dysuria, frequency or hematuria  NEUROLOGICAL: No numbness or weakness  All other review of systems is negative unless indicated above      PHYSICAL EXAM:    GENERAL: NAD, well-groomed, well-developed  EYES: EOMI, PERRLA,   NECK: Supple, No JVD; Normal thyroid; Trachea midline  NERVOUS SYSTEM:  Alert & Oriented X3,  Motor Strength 5/5 B/L upper and lower extremities; DTRs 2+ intact and symmetric  CHEST/LUNG: No rales, rhonchi, wheezing   HEART: Regular rate and rhythm; No murmurs,   ABDOMEN: Soft, Nontender, Nondistended; Bowel sounds present  EXTREMITIES:  2+ Peripheral Pulses, No clubbing, cyanosis, or edema        LABS:  CBC Full  -  ( 16 Jan 2020 04:19 )  WBC Count : 4.97 K/uL  RBC Count : 3.59 M/uL  Hemoglobin : 11.6 g/dL  Hematocrit : 36.5 %  Platelet Count - Automated : 25 K/uL  Mean Cell Volume : 101.7 fl  Mean Cell Hemoglobin : 32.3 pg  Mean Cell Hemoglobin Concentration : 31.8 gm/dL  Auto Neutrophil # : x  Auto Lymphocyte # : x  Auto Monocyte # : x  Auto Eosinophil # : x  Auto Basophil # : x  Auto Neutrophil % : x  Auto Lymphocyte % : x  Auto Monocyte % : x  Auto Eosinophil % : x  Auto Basophil % : x    01-16    142  |  109<H>  |  95<H>  ----------------------------<  89  6.0<H>   |  23  |  2.15<H>    Ca    11.3<H>      16 Jan 2020 04:19  Phos  3.9     01-16  Mg     2.4     01-16    TPro  5.7<L>  /  Alb  1.5<L>  /  TBili  8.2<H>  /  DBili  x   /  AST  344<H>  /  ALT  188<H>  /  AlkPhos  213<H>  01-15    PT/INR - ( 15 Luiz 2020 09:07 )   PT: 16.0 sec;   INR: 1.43 ratio             Culture Results:   Growth in aerobic bottle: Gram Negative Rods  Growth in anaerobic bottle: Gram Negative Rods  ***Blood Panel PCR results on this specimen are available  approximately 3 hours after the Gram stain result.***  Gram stain, PCR, and/or culture results may not always  correspond due to difference in methodologies.  ************************************************************  This PCR assay was performed using [employer].  The following targets are tested for: Enterococcus,  vancomycin resistant enterococci, Listeria monocytogenes,  coagulase negative staphylococci, S. aureus,  methicillin resistant S. aureus, Streptococcus agalactiae  (Group B), S. pneumoniae, S. pyogenes (Group A),  Acinetobacter baumannii, Enterobacter cloacae, E. coli,  Klebsiella oxytoca, K. pneumoniae, Proteus sp.,  Serratia marcescens, Haemophilus influenzae,  Neisseria meningitidis, Pseudomonas aeruginosa, Candida  albicans, C. glabrata, C krusei, C parapsilosis,  C. tropicalis and the KPC resistance gene. (01-15 @ 01:22)      RADIOLOGY & ADDITIONAL STUDIES REVIEWED:  ***    [ ]GOALS OF CARE DISCUSSION WITH PATIENT/FAMILY/PROXY:    CRITICAL CARE TIME SPENT: 35 minutes

## 2020-01-16 NOTE — PROGRESS NOTE ADULT - PROBLEM SELECTOR PLAN 4
Patient off sedation but remains unresponsive even to pain, unable to follow commands. CT indicates low density in the left basal ganglia which could represent a recent small acute and/or subacute infarct; however prominent Virchow-Mariano spaces may also be considered. Overnight continuous EEG indicated generalized background slowing and voltage attenuation but no seizure tendency or status epilepticus. Patient's sister/Juliann is primary surrogate.

## 2020-01-16 NOTE — PROGRESS NOTE ADULT - SUBJECTIVE AND OBJECTIVE BOX
GI PROGRESS NOTE    Patient is a 69y old  Male who presents with a chief complaint of CHF exacerbation, afib with RVR, alcohol withdrawal and sepsis 2/2 cellulitis. then had respiratory distress and now in ICU (16 Jan 2020 10:22)      HPI:  70 y/o male with PMHx of afib (on Pradaxa), HTN and alcohol abuse presents to the ED with chief complaint of weakness and shortness of breath. Patient is AAOx2 and a vague historian. He reports having multiple falls in the past 2 weeks. He thinks he has experienced LOC sometimes as well. He endorses left lower chest pain, described as worse with movement and pleuritic in nature. He reports lower extremitiy edema over the past few months. He also complains of left lower abdominal pain. He denies nausea or vomiting. He reports currently drinking 1 bottle of wine daily with a shot of scotch. He lives alone and says he ambulates independently.     ED course:  EKG shows afib with RVR  RLE noted to be cool to touch compared to left - bedside doppler confirmed pulses of both LE. (04 Jan 2020 00:23)          ______________________________________________________________________  PMH/PSH:  PAST MEDICAL & SURGICAL HISTORY:  HTN (hypertension)  Atrial fibrillation  No significant past surgical history    ______________________________________________________________________  MEDS:  MEDICATIONS  (STANDING):  atorvastatin 40 milliGRAM(s) Oral at bedtime  cefepime   IVPB      cefepime   IVPB 2000 milliGRAM(s) IV Intermittent every 8 hours  chlorhexidine 0.12% Liquid 15 milliLiter(s) Oral Mucosa two times a day  chlorhexidine 0.12% Liquid 15 milliLiter(s) Oral Mucosa every 12 hours  dextrose 5% + sodium chloride 0.45%. 1000 milliLiter(s) (75 mL/Hr) IV Continuous <Continuous>  fentaNYL   Infusion 1 MICROgram(s)/kG/Hr (12.5 mL/Hr) IV Continuous <Continuous>  folic acid 1 milliGRAM(s) Oral daily  insulin lispro (HumaLOG) corrective regimen sliding scale   SubCutaneous every 6 hours  metoclopramide 5 milliGRAM(s) Oral every 8 hours  metoclopramide Injectable 10 milliGRAM(s) IV Push every 6 hours  midodrine 5 milliGRAM(s) Oral every 8 hours  multivitamin 1 Tablet(s) Oral daily  nystatin Powder 1 Application(s) Topical three times a day  pantoprazole   Suspension 40 milliGRAM(s) Enteral Tube daily  pantoprazole  Injectable 40 milliGRAM(s) IV Push two times a day  phenylephrine    Infusion 2.7 MICROgram(s)/kG/Min (63.281 mL/Hr) IV Continuous <Continuous>  predniSONE   Tablet 40 milliGRAM(s) Oral daily  QUEtiapine 25 milliGRAM(s) Oral every 12 hours  sodium zirconium cyclosilicate 5 Gram(s) Oral every 8 hours    MEDICATIONS  (PRN):  fentaNYL    Injectable 25 MICROGram(s) IV Push every 4 hours PRN Severe Pain (7 - 10)  lactulose Syrup 30 Gram(s) Oral every 8 hours PRN Titrate upto 3 BMs  morphine  - Injectable 4 milliGRAM(s) IV Push every 4 hours PRN respiratory distress  sodium chloride 0.9% lock flush 10 milliLiter(s) IV Push every 1 hour PRN Pre/post blood products, medications, blood draw, and to maintain line patency    ______________________________________________________________________  ALL:   Allergies    No Known Allergies    Intolerances      ______________________________________________________________________  SH: Social History:  Former smoker - 1ppd for 40 years; quit at age 57; drinks heavily - 1 bottle of wine daily; lives alone (04 Jan 2020 00:23)    ______________________________________________________________________  FH:  FAMILY HISTORY:  No pertinent family history in first degree relatives    ______________________________________________________________________  ROS:    CONSTITUTIONAL:  No weight loss, fever, chills, weakness or fatigue.    HEENT:  Eyes:  No visual loss, blurred vision, double vision or yellow sclerae. Ears, Nose, Throat:  No hearing loss, sneezing, congestion, runny nose or sore throat.    SKIN:  No rash or itching.    CARDIOVASCULAR:  No chest pain, chest pressure or chest discomfort. No palpitations or edema.    RESPIRATORY:  No shortness of breath, cough or sputum.    GASTROINTESTINAL:  SEE HPI    GENITOURINARY:  No dysuria, hematuria, urinary frequency    NEUROLOGICAL:  No headache, dizziness, syncope, paralysis, ataxia, numbness or tingling in the extremities. No change in bowel or bladder control.    MUSCULOSKELETAL:  No muscle, back pain, joint pain or stiffness.    HEMATOLOGIC:  No anemia, bleeding or bruising.    LYMPHATICS:  No enlarged nodes. No history of splenectomy.    PSYCHIATRIC:  No history of depression or anxiety.    ENDOCRINOLOGIC:  No reports of sweating, cold or heat intolerance. No polyuria or polydipsia.    ALLERGIES:  No history of asthma, hives, eczema or rhinitis.  ______________________________________________________________________  PHYSICAL EXAM:  T(C): 36.7 (01-16-20 @ 07:00), Max: 37.4 (01-15-20 @ 21:00)  HR: 101 (01-16-20 @ 12:00)  BP: 131/113 (01-16-20 @ 12:00)  RR: 30 (01-16-20 @ 12:00)  SpO2: 99% (01-16-20 @ 12:00)  Wt(kg): --    01-15  -  01-16  --------------------------------------------------------  IN:    dextrose 5% + sodium chloride 0.45%.: 825 mL    Enteral Tube Flush: 150 mL    phenylephrine   Infusion: 506.2 mL    Sodium Chloride 0.9% IV Bolus: 1000 mL    Solution: 200 mL  Total IN: 2681.2 mL    OUT:    Emesis: 900 mL    Indwelling Catheter - Urethral: 250 mL    Nasoenteral Tube: 200 mL  Total OUT: 1350 mL    Total NET: 1331.2 mL      01-16 - 01-16  --------------------------------------------------------  IN:    Enteral Tube Flush: 250 mL    Enteral Tube Flush: 150 mL    phenylephrine   Infusion: 325 mL  Total IN: 725 mL    OUT:    Indwelling Catheter - Urethral: 45 mL    Nasoenteral Tube: 300 mL  Total OUT: 345 mL    Total NET: 380 mL          GEN: Non-responsive    CVS- S1 S2  ABD: soft nontender, non distended, bowel sounds+  LUNGS: clear to auscultation  NEURO: does not follow commands  Extremities: no cyanosis, no calf tenderness, + edema, + mottling     ______________________________________________________________________  LABS:                        11.6   4.97  )-----------( 25       ( 16 Jan 2020 04:19 )             36.5     01-16    142  |  109<H>  |  95<H>  ----------------------------<  89  6.0<H>   |  23  |  2.15<H>    Ca    11.3<H>      16 Jan 2020 04:19  Phos  3.9     01-16  Mg     2.4     01-16    TPro  5.7<L>  /  Alb  1.5<L>  /  TBili  8.2<H>  /  DBili  x   /  AST  344<H>  /  ALT  188<H>  /  AlkPhos  213<H>  01-15    LIVER FUNCTIONS - ( 15 Luiz 2020 06:27 )  Alb: 1.5 g/dL / Pro: 5.7 g/dL / ALK PHOS: 213 U/L / ALT: 188 U/L DA / AST: 344 U/L / GGT: x           ______________________________________________________________________  IMAGING: CT A/P - Findings suggestive of right hilar mass with enlarged right paratracheal mediastinal lymphadenopathy; Head CT -  low density in the left basal ganglia could represent a recent small acute and/or subacute infarct.

## 2020-01-16 NOTE — CONSULT NOTE ADULT - SUBJECTIVE AND OBJECTIVE BOX
Patient is a 69y old  Male who presents with a chief complaint of arm pain (15 Luiz 2020 16:36)      HPI:  70 y/o male with PMHx of afib (on Pradaxa), HTN and alcohol abuse presents to the ED with chief complaint of weakness and shortness of breath. Patient is AAOx2 and a vague historian. He reports having multiple falls in the past 2 weeks. He thinks he has experienced LOC sometimes as well. He endorses left lower chest pain, described as worse with movement and pleuritic in nature. He reports lower extremitiy edema over the past few months. He also complains of left lower abdominal pain. He denies nausea or vomiting. He reports currently drinking 1 bottle of wine daily with a shot of scotch. He lives alone and says he ambulates independently.he developed resp failure and intubated.  he was treted for Afib with RVR, CHF, alcohol withdraw.  But his consciousness never returned.  fever, and hypotension developed in the last 2 days.  B/C was positive for klebsiella.  His WBC dropped form 5 to 2.3, then returned to 4.  his platelet was 55 at admission, it went down to 25.  He has GIB now.  Liver dysfunction persists with new onset hypercalcemia.  CT showed hilar mass with elevated CEA.     ED course:  EKG shows afib with RVR  RLE noted to be cool to touch compared to left - bedside doppler confirmed pulses of both LE. (04 Jan 2020 00:23)       ROS:  Negative except for:    PAST MEDICAL & SURGICAL HISTORY:  HTN (hypertension)  Atrial fibrillation  No significant past surgical history      SOCIAL HISTORY:    FAMILY HISTORY:  No pertinent family history in first degree relatives      MEDICATIONS  (STANDING):  atorvastatin 40 milliGRAM(s) Oral at bedtime  cefepime   IVPB      cefepime   IVPB 2000 milliGRAM(s) IV Intermittent every 8 hours  chlorhexidine 0.12% Liquid 15 milliLiter(s) Oral Mucosa two times a day  dextrose 5% + sodium chloride 0.45%. 1000 milliLiter(s) (75 mL/Hr) IV Continuous <Continuous>  folic acid 1 milliGRAM(s) Oral daily  insulin lispro (HumaLOG) corrective regimen sliding scale   SubCutaneous every 6 hours  metoclopramide 5 milliGRAM(s) Oral every 8 hours  metoclopramide Injectable 10 milliGRAM(s) IV Push every 6 hours  midodrine 5 milliGRAM(s) Oral every 8 hours  multivitamin 1 Tablet(s) Oral daily  nystatin Powder 1 Application(s) Topical three times a day  pantoprazole   Suspension 40 milliGRAM(s) Enteral Tube daily  pantoprazole  Injectable 40 milliGRAM(s) IV Push two times a day  phenylephrine    Infusion 0.5 MICROgram(s)/kG/Min (23.438 mL/Hr) IV Continuous <Continuous>  predniSONE   Tablet 40 milliGRAM(s) Oral daily  QUEtiapine 25 milliGRAM(s) Oral every 12 hours  sodium zirconium cyclosilicate 5 Gram(s) Oral every 8 hours    MEDICATIONS  (PRN):  fentaNYL    Injectable 25 MICROGram(s) IV Push every 4 hours PRN Severe Pain (7 - 10)  lactulose Syrup 30 Gram(s) Oral every 8 hours PRN Titrate upto 3 BMs  morphine  - Injectable 4 milliGRAM(s) IV Push every 4 hours PRN respiratory distress  sodium chloride 0.9% lock flush 10 milliLiter(s) IV Push every 1 hour PRN Pre/post blood products, medications, blood draw, and to maintain line patency      Allergies    No Known Allergies    Intolerances        Vital Signs Last 24 Hrs  T(C): 36.7 (16 Jan 2020 07:00), Max: 37.4 (15 Luiz 2020 21:00)  T(F): 98 (16 Jan 2020 07:00), Max: 99.4 (15 Luiz 2020 21:00)  HR: 118 (16 Jan 2020 09:15) (106 - 139)  BP: 102/65 (16 Jan 2020 09:15) (64/52 - 125/76)  BP(mean): 74 (16 Jan 2020 09:15) (33 - 94)  RR: 26 (16 Jan 2020 09:15) (15 - 39)  SpO2: 99% (16 Jan 2020 09:15) (86% - 100%)    PHYSICAL EXAM  General: adult in NAD  HEENT: clear oropharynx, anicteric sclera, pink conjunctiva  Neck: supple  CV: normal S1/S2 with no murmur rubs or gallops  Lungs: positive air movement b/l ant lungs,clear to auscultation, no wheezes, no rales  Abdomen: soft non-tender non-distended, no hepatosplenomegaly  Ext: no clubbing cyanosis or edema  Skin: no rashes and no petechiae  Neuro: alert and oriented X 4, no focal deficits      LABS:                          11.6   4.97  )-----------( 25       ( 16 Jan 2020 04:19 )             36.5         Mean Cell Volume : 101.7 fl  Mean Cell Hemoglobin : 32.3 pg  Mean Cell Hemoglobin Concentration : 31.8 gm/dL  Auto Neutrophil # : x  Auto Lymphocyte # : x  Auto Monocyte # : x  Auto Eosinophil # : x  Auto Basophil # : x  Auto Neutrophil % : x  Auto Lymphocyte % : x  Auto Monocyte % : x  Auto Eosinophil % : x  Auto Basophil % : x      Serial CBC's  01-16 @ 04:19  Hct-36.5 / Hgb-11.6 / Plat-25 / RBC-3.59 / WBC-4.97  Serial CBC's  01-15 @ 21:27  Hct-35.9 / Hgb-11.5 / Plat-28 / RBC-3.56 / WBC-4.77  Serial CBC's  01-15 @ 16:48  Hct-36.3 / Hgb-11.4 / Plat-31 / RBC-3.53 / WBC-3.59  Serial CBC's  01-15 @ 12:32  Hct-36.3 / Hgb-11.5 / Plat-33 / RBC-3.55 / WBC-3.15  Serial CBC's  01-15 @ 06:27  Hct-39.1 / Hgb-12.2 / Plat-35 / RBC-3.82 / WBC-3.67  Serial CBC's  01-15 @ 00:46  Hct-39.8 / Hgb-12.5 / Plat-39 / RBC-3.85 / WBC-4.01  Serial CBC's  01-14 @ 20:13  Hct-40.1 / Hgb-12.5 / Plat-40 / RBC-3.90 / WBC-3.97  Serial CBC's  01-14 @ 04:10  Hct-38.4 / Hgb-12.1 / Plat-32 / RBC-3.70 / WBC-2.69  Serial CBC's  01-13 @ 11:21  Hct-43.4 / Hgb-13.4 / Plat-31 / RBC-4.18 / WBC-2.37  Serial CBC's  01-13 @ 05:21  Hct-44.5 / Hgb-14.1 / Plat-33 / RBC-4.32 / WBC-2.47      01-16    142  |  109<H>  |  95<H>  ----------------------------<  89  6.0<H>   |  23  |  2.15<H>    Ca    11.3<H>      16 Jan 2020 04:19  Phos  3.9     01-16  Mg     2.4     01-16    TPro  5.7<L>  /  Alb  1.5<L>  /  TBili  8.2<H>  /  DBili  x   /  AST  344<H>  /  ALT  188<H>  /  AlkPhos  213<H>  01-15      PT/INR - ( 15 Luiz 2020 09:07 )   PT: 16.0 sec;   INR: 1.43 ratio                 Serum Protein Electrophoresis Interp: Hypoalbuminemia (01-14 @ 17:10)  Immunofixation, Serum:   No Monoclonal Band Identified    Reference Range: None Detected (01-14 @ 17:10)          BLOOD SMEAR INTERPRETATION:       RADIOLOGY & ADDITIONAL STUDIES:  < from: CT Abdomen and Pelvis No Cont (01.15.20 @ 16:12) >  PROCEDURE:   CT of the Chest, Abdomen and Pelvis was performed without intravenous contrast.   Intravenous contrast: None.  Oral contrast: None.  Sagittal and coronal reformats were performed.    FINDINGS:    CHEST:     LUNGS AND LARGE AIRWAYS: PLEURA:   Endotracheal tube is present tip above the level the ebony.    There is narrowing and thickening surrounding the right upper lobe bronchus with collapse of the anterior and posterior segmental right upper lobe bronchi. There is surrounding right hilar soft tissue density, difficult to evaluate without intravenous contrast, suspicious for hilar mass/neoplasm.    There is right lower lobe airspace consolidation which may reflect atelectasis and/or pneumonia.    There are several small poorly defined pulmonary nodules in the bilateral upper and lower lung zones.  In addition, there are poorly defined branching tree-in-bud nodular opacities, most pronounced in the right lower lobe.  There is probable trace right-sided pleural effusion.    VESSELS: Atherosclerotic calcification of thoracic aorta. Coronary artery calcifications.  HEART: Cardiomegaly. No pericardial effusion.  MEDIASTINUM AND ARIK: Right paratracheal lymphadenopathy measuring up to2.8 cm in short axis.  CHEST WALL AND LOWER NECK:   There is fullness of the right pectoralis minor musculature with edema/stranding in the right pectoralis and axillary space. Correlate for hematoma or cellulitis.    ABDOMEN AND PELVIS:    Streak artifact degrades image quality limiting evaluation.  The evaluation of the solid organ parenchyma is limited without intravenous contrast.    LIVER: Enlarged liver.  Nodular contour. Evaluation of the liver parenchyma is limited without intravenous contrast.  Trace perihepatic ascites.  BILE DUCTS: Normal caliber.  GALLBLADDER: Partially contracted; edematous wall.  SPLEEN: Within normal limits.  PANCREAS: Within normal limits.  ADRENALS: Fusiform nodularity bilateral adrenal glands.  KIDNEYS/URETERS: No hydronephrosis.      < end of copied text >  < from: CT Abdomen and Pelvis No Cont (01.15.20 @ 16:12) >  BLADDER: Balloon Jones catheter within a nondistended urinary bladder.  REPRODUCTIVE ORGANS: Unremarkable as visualized.    BOWEL: Nasogastric tube, tip in  stomach.  Rectal probe.  No bowel obstruction.  There is suggestion of colonic wall thickening extending from ascending through descending colon, may be accentuated by underdistention; correlate for colitis. Appendix not adequately visualized on this exam.  PERITONEUM: Trace ascites.  VESSELS: Atherosclerotic calcification of the abdominal aorta.  RETROPERITONEUM/LYMPH NODES: No lymphadenopathy.    ABDOMINAL WALL: Generalized subcutaneous soft tissue edema/anasarca  BONES: No acute osseous abnormality noted.    IMPRESSION:     Limited study.    Findings suggestiveof right hilar mass with enlarged right paratracheal mediastinal lymphadenopathy.  Recommend further clinical correlation for bronchogenic carcinoma.    Right lower lobe airspace consolidation may reflect obstructive atelectasis/pneumonitis.    Smallbilateral pulmonary nodules suspicious for metastatic disease.    Tree-in-bud nodular opacities, most pronounced right lower lobe could reflect endobronchial spread of infection or tumor.    Recommend further evaluation with PET/CT scan as clinicallyindicated.    Hepatomegaly; limited evaluation of the liver on this exam.    < end of copied text >  < from: CT Abdomen and Pelvis No Cont (01.15.20 @ 16:12) >    Colonic wall thickening, which may be accentuated by underdistention, correlate clinically for pancolitis.  No bowel obstruction.    < end of copied text >

## 2020-01-16 NOTE — DISCHARGE NOTE FOR THE EXPIRED PATIENT - HOSPITAL COURSE
HPI:  70 y/o male with PMHx of afib (on Pradaxa), HTN and alcohol abuse presents to the ED with chief complaint of weakness and shortness of breath. Patient is AAOx2 and a vague historian. He reports having multiple falls in the past 2 weeks. He thinks he has experienced LOC sometimes as well. He endorses left lower chest pain, described as worse with movement and pleuritic in nature. He reports lower extremitiy edema over the past few months. He also complains of left lower abdominal pain. He denies nausea or vomiting. He reports currently drinking 1 bottle of wine daily with a shot of scotch. He lives alone and says he ambulates independently.  He was admitted in the ICU for Acute hypoxic respiratory failure and was started on Mechanical ventillation. He developed septic shock most likely secondary to aspiration pnuemonia and bacteremia. He developed multiorgan failure.   Family was informed on  about his poor prognosis and family agreed on Comfort care with No escalation of care. He was maintained on max dose of pressors to keep the vitals stable. He  on 13:46  due to Cardio pulmonary arrest from the septic shock.

## 2020-01-16 NOTE — PROGRESS NOTE ADULT - PROBLEM SELECTOR PLAN 2
Per CT, patient with right hilar mass with enlarged right paratracheal mediastinal lymphadenopathy (concern for bronchogenic carcinoma); small bilateral pulmonary nodules suspicious for metastatic disease. Liver u/s indicates heterogeneous echogenicity with multiple hyperechoic and hypoechoic nodules. CEA 13.4. Family aware of findings; no bx at this time. Family leaning toward withdrawal of care; requesting DNR / dNI / no escalation of care.     Tree-in-bud nodular opacities, most pronounced right lower lobe could reflect endobronchial spread of infection or tumor.

## 2020-01-16 NOTE — PROGRESS NOTE ADULT - ASSESSMENT
A/P:  REI: pre -renal azotemia ,worsening  last 24 hrs  -suggest to keep sbp>110 to avoid further renal injury   -Keep patient euvolemic and renal diet  -Avoid Nephrotoxic Meds/ Agents such as (NSAIDs, IV contrast, Aminoglycosides such as gentamicin, -Gadolinium contrast, Phosphate containing enemas, etc..)  -Adjust Medications according to eGFR  -f/u bmp daily    HYPERCALCEMIA: mildly better   -continue Calcitonin and Prednisone if no ci   - Lasix 40mg iv if bp is ok  -f/u Pth-related  protein  -f/u ca level daily  -suggest endocrine consult    EDEMA:multifactorial like chf/liver ds, mild-stable  -avoid iv fluid  -continue lasix prn    HYPERNATREMIA: secondary to  water deficit, improved  - free water via  GT  prn , avoid hyperosmolar tube feeding if possible switch to isocal   -follow BMP daily  HYPERKALEMIA; mild    -continue Lokelma prn for k>5.3  - low k tube feeding

## 2020-01-16 NOTE — CHART NOTE - NSCHARTNOTEFT_GEN_A_CORE
Patient's family was informed the patient  prior to their arrival.  PC  and PC NP provided emotional support.  As per the family's request, this  contacted Yonatan who stated he will pray with the family at the bedside.  Patient's family inquired about  the process of the  home coming to the hospital.  PC  stated once the family has chosen a  home they will need to put the  home in touch with the hospital and subsequently the patient will be transported to the  home.  In the interim the patient will be taken to the [facility].  Family expressed appreciation of this sw's assistance and support and denied addl. needs at this time.  They stated it appeared the patient was well cared for during this admission.

## 2020-01-16 NOTE — CONSULT NOTE ADULT - PROBLEM SELECTOR RECOMMENDATION 2
Continue diuresis, with 2gm sodium diet
at admission, platelet 55, from alcohol intoxication  it went down to 25 recently probably due to sepsis/DIC  there is GIB, need platelet transfusion  check PT/PTT and fibrinogen  he may need FFP and cryo
2/2 aspiration. Per CXY, patient with RUL opacity suggestive of atelectasis, infiltrate or mass; small left pleural effusion; concern for possible mucus plugging. Patient remains sedated/intubated; on IV abx and pressor support. Full code.

## 2020-01-16 NOTE — PROGRESS NOTE ADULT - SUBJECTIVE AND OBJECTIVE BOX
OVERNIGHT EVENTS: patient again on pressor support, + MOF, remains unresponsive despite no sedation. CT indicates likely metastatic disease, + issac terminal ulcers    Present Symptoms:   Review of Systems:   Unable to obtain due to poor mentation - patient intubated, unresponsive    MEDICATIONS  (STANDING):  atorvastatin 40 milliGRAM(s) Oral at bedtime  cefepime   IVPB      cefepime   IVPB 2000 milliGRAM(s) IV Intermittent every 8 hours  chlorhexidine 0.12% Liquid 15 milliLiter(s) Oral Mucosa two times a day  dextrose 5% + sodium chloride 0.45%. 1000 milliLiter(s) (75 mL/Hr) IV Continuous <Continuous>  folic acid 1 milliGRAM(s) Oral daily  insulin lispro (HumaLOG) corrective regimen sliding scale   SubCutaneous every 6 hours  metoclopramide 5 milliGRAM(s) Oral every 8 hours  metoclopramide Injectable 10 milliGRAM(s) IV Push every 6 hours  midodrine 5 milliGRAM(s) Oral every 8 hours  multivitamin 1 Tablet(s) Oral daily  nystatin Powder 1 Application(s) Topical three times a day  pantoprazole   Suspension 40 milliGRAM(s) Enteral Tube daily  pantoprazole  Injectable 40 milliGRAM(s) IV Push two times a day  phenylephrine    Infusion 2.7 MICROgram(s)/kG/Min (63.281 mL/Hr) IV Continuous <Continuous>  predniSONE   Tablet 40 milliGRAM(s) Oral daily  QUEtiapine 25 milliGRAM(s) Oral every 12 hours  sodium zirconium cyclosilicate 5 Gram(s) Oral every 8 hours    MEDICATIONS  (PRN):  fentaNYL    Injectable 25 MICROGram(s) IV Push every 4 hours PRN Severe Pain (7 - 10)  lactulose Syrup 30 Gram(s) Oral every 8 hours PRN Titrate upto 3 BMs  morphine  - Injectable 4 milliGRAM(s) IV Push every 4 hours PRN respiratory distress  sodium chloride 0.9% lock flush 10 milliLiter(s) IV Push every 1 hour PRN Pre/post blood products, medications, blood draw, and to maintain line patency      PHYSICAL EXAM:  Vital Signs Last 24 Hrs  T(C): 36.7 (16 Jan 2020 07:00), Max: 37.4 (15 Luiz 2020 21:00)  T(F): 98 (16 Jan 2020 07:00), Max: 99.4 (15 Luiz 2020 21:00)  HR: 101 (16 Jan 2020 12:00) (92 - 139)  BP: 131/113 (16 Jan 2020 12:00) (64/52 - 131/113)  BP(mean): 117 (16 Jan 2020 12:00) (33 - 117)  RR: 30 (16 Jan 2020 12:00) (15 - 39)  SpO2: 99% (16 Jan 2020 12:00) (85% - 100%)  General: patient intubated; not responsive even to pain, unable to follow commands. No distress.   Karnofsky Performance Score/Palliative Performance Status Version2:     20%    HEENT:  dry mouth  ET tube   Lungs: tachypnea/labored breathing, on vent support.   CV: S1S2, IRR. tachycardia. On pressor  GI: soft, NTND, incontinent  : mansfield cath  Musculoskeletal: patient not responsive event to pain, unable to follow commands, dependent on all ADLs  Skin: + anasarca, cool extremities, +mottling BLE, Issac Terminal Ulcer to the L. Gluteus and Coccyx area, Issac Terminal Ulcer to the L. Flank (x4) area, +purplish discoloration to the Perianal area  Neuro: patient off sedation, not responsive to pain, unable to follow commands  Oral intake ability: unable/only mouth care    Diet: NPO; TF via NG tube        LABS:                          11.6   4.97  )-----------( 25       ( 16 Jan 2020 04:19 )             36.5     01-16    142  |  109<H>  |  95<H>  ----------------------------<  89  6.0<H>   |  23  |  2.15<H>    Ca    11.3<H>      16 Jan 2020 04:19  Phos  3.9     01-16  Mg     2.4     01-16    TPro  5.7<L>  /  Alb  1.5<L>  /  TBili  8.2<H>  /  DBili  x   /  AST  344<H>  /  ALT  188<H>  /  AlkPhos  213<H>  01-15    Albumin: 1.5    RADIOLOGY & ADDITIONAL STUDIES:  < from: CT Abdomen and Pelvis No Cont (01.15.20 @ 16:12) >  EXAM:  CT ABDOMEN AND PELVIS                        EXAM:  CT CHEST                        PROCEDURE DATE:  01/15/2020    INTERPRETATION:  CLINICAL INFORMATION: Hypoxic respiratory failure, thrombocytopenia, fevers elevated LFTs.  COMPARISON: CT abdomen and pelvis 1/3/2020.  PROCEDURE:   CT of the Chest, Abdomen and Pelvis was performed without intravenous contrast.   Intravenous contrast: None.  Oral contrast: None.  Sagittal and coronal reformats were performed.  FINDINGS:  CHEST:   LUNGS AND LARGE AIRWAYS: PLEURA:   Endotracheal tube is present tip above the level the ebony.  There is narrowing and thickening surrounding the right upper lobe bronchus with collapse of the anterior and posterior segmental right upper lobe bronchi. There is surrounding right hilar soft tissue density, difficult to evaluate without intravenous contrast, suspicious for hilar mass/neoplasm.  There is right lower lobe airspace consolidation which may reflect atelectasis and/or pneumonia.  There are several small poorly defined pulmonary nodules in the bilateral upper and lower lung zones.  In addition, there are poorly defined branching tree-in-bud nodular opacities, most pronounced in the right lower lobe.  There is probable trace right-sided pleural effusion.  VESSELS: Atherosclerotic calcification of thoracic aorta. Coronary artery calcifications.  HEART: Cardiomegaly. No pericardial effusion.  MEDIASTINUM AND ARIK: Right paratracheal lymphadenopathy measuring up to2.8 cm in short axis.  CHEST WALL AND LOWER NECK:   There is fullness of the right pectoralis minor musculature with edema/stranding in the right pectoralis and axillary space. Correlate for hematoma or cellulitis.  ABDOMEN AND PELVIS:  Streak artifact degrades image quality limiting evaluation.  The evaluation of the solid organ parenchyma is limited without intravenous contrast.  LIVER: Enlarged liver.  Nodular contour. Evaluation of the liver parenchyma is limited without intravenous contrast.  Trace perihepatic ascites.  BILE DUCTS: Normal caliber.  GALLBLADDER: Partially contracted; edematous wall.  SPLEEN: Within normal limits.  PANCREAS: Within normal limits.  ADRENALS: Fusiform nodularity bilateral adrenal glands.  KIDNEYS/URETERS: No hydronephrosis.  BLADDER: Balloon Mansfield catheter within a nondistended urinary bladder.  REPRODUCTIVE ORGANS: Unremarkable as visualized.  BOWEL: Nasogastric tube, tip in  stomach.  Rectal probe.  No bowel obstruction.  There is suggestion of colonic wall thickening extending from ascending through descending colon, may be accentuated by underdistention; correlate for colitis. Appendix not adequately visualized on this exam.  PERITONEUM: Trace ascites.  VESSELS: Atherosclerotic calcification of the abdominal aorta.  RETROPERITONEUM/LYMPH NODES: No lymphadenopathy.    ABDOMINAL WALL: Generalized subcutaneous soft tissue edema/anasarca  BONES: No acute osseous abnormality noted.    IMPRESSION:   Limited study.  Findings suggestive of right hilar mass with enlarged right paratracheal mediastinal lymphadenopathy.  Recommend further clinical correlation for bronchogenic carcinoma.  Right lower lobe airspace consolidation may reflect obstructive atelectasis/pneumonitis.  Small bilateral pulmonary nodules suspicious for metastatic disease.  Tree-in-bud nodular opacities, most pronounced right lower lobe could reflect endobronchial spread of infection or tumor.  Recommend further evaluation with PET/CT scan as clinically indicated.  Hepatomegaly; limited evaluation of the liver on this exam.  Colonic wall thickening, which may be accentuated by underdistention, correlate clinically for pancolitis.  No bowel obstruction.  Other findings as discussed above.  < end of copied text >    < from: CT Head No Cont (01.15.20 @ 16:11) >  EXAM:  CT BRAIN                        PROCEDURE DATE:  01/15/2020    INTERPRETATION:  INDICATION:  Altered mental status  TECHNIQUE:  A non contrast 2.5mm axial CT study of the brain was performed from skull base to vertex. Coronal and sagittal reformations were generated from the axial data.  COMPARISON EXAMINATION:  No prior    FINDINGS:    HEMISPHERES:  There are mild involutional changes and volume loss. There is low density in the left basal ganglions which could reflect a recent and/or acute left basal ganglia infarct. However there are prominent Virchow-Mariano spaces may also be considered. This may be further assessed with MR imaging.. No space-occupying lesion is noted.  VENTRICLES:  Midline and normal in size.  POSTERIOR FOSSA:  The brain stem and cerebellum are unremarkable.  No CP angle lesion noted.  EXTRACEREBRAL SPACES:  No subdural or epidural collections are noted. A partially calcified meningioma is noted in the left frontal convexity region, measuring approximately 1.3 cm..  SKULL BASE AND CALVARIUM:  Appears intact.  No fracture or destructive lesion is identified.  SINUSES AND MASTOIDS:  Mucosal thickening is noted in the sinuses.  MISCELLANEOUS:  The patient is orally intubated.     IMPRESSION:    1)  low density in the left basal ganglia could represent a recent small acute and/or subacute infarct. However prominent Virchow-Mariano spaces may also be considered. No hemorrhage or intracerebral mass identified.  2)  follow-up MR imaging is recommended    < end of copied text >    < from: Xray Chest 1 View- PORTABLE-Routine (01.16.20 @ 10:04) >  EXAM:  XR CHEST PORTABLE ROUTINE 1V                        PROCEDURE DATE:  01/16/2020    INTERPRETATION:  Portable chest x-ray  Indication: ICU follow-up.  Portable chest x-ray is compared to a previous examination dated 1/15/2020.    Impression: Stable ET tube. NG tube terminates beyond the GE junction. Previously noted right subclavian central venous catheter has been removed.  Right upper lobe density has cleared. New airspace opacifications in the right lower lung zone.  New mild bilateral pleural effusions.  No evidence for pneumothorax.  Stable cardiac silhouette.  < end of copied text >    < from: US Hepatic & Pancreatic (01.15.20 @ 16:00) >  EXAM:  US LIVER AND PANCREAS                        PROCEDURE DATE:  01/15/2020    INTERPRETATION:  CLINICAL INFORMATION: Rising LFT  COMPARISON: 1/6/2020  TECHNIQUE: Limited sonogram of the abdomen.   FINDINGS:  Liver: 26.7 cm. Heterogeneous echogenicity with multiple hyperechoic and hypoechoic nodules.  Bile ducts: Normal caliber. Common bile duct measures 3 mm.   Gallbladder: Contracted.      Pancreas: Visualized portions are within normal limits.  Right kidney: 12.6 cm. Nohydronephrosis.  Ascites: None.  Aorta and IVC: Visualized portions are within normal limits.    IMPRESSION:   No change of hepatomegaly with heterogeneous echogenicity with multiple nodules.  < end of copied text >    EEG REPORT:   EEG Report:  · EEG Report		  Overnight continuous EEG only shows generalized background slowing and voltage attenuation, but no seizure tendency or status epilepticus. Full report to follow. Continuous EEG may be disconnected by this afternoon.      Electronic Signatures:  Vin Hassan)  (Signed 16-Jan-2020 10:40)  	Authored: EEG REPORT    ADVANCE DIRECTIVES: New Molst completed as per surrogate's wishes: DNR / DNI / no escalation of care/ comfort measures.

## 2020-01-16 NOTE — PROGRESS NOTE ADULT - ASSESSMENT
68 y/o male with PMHx of afib (on Pradaxa), HTN and alcohol abuse presents to the ED with chief complaint of weakness and shortness of breath. Patient is initially admitted to medical floor for CHF exacerbation, afib with RVR, alcohol withdrawal and sepsis 2/2 cellulitis. He was transferred to ICU after RRT for respiratory distress. Patient remains intubated and unresponsive in the ICU. Prognosis is poor in this patient with underlying cirrhosis, renal failure, encephalopathy, and respiratory failure.

## 2020-01-16 NOTE — CONSULT NOTE ADULT - PROBLEM SELECTOR RECOMMENDATION 9
Treat as I was not able to confirm if he grew out anything in his blood .
klebsiella sepsis
2/2 shock due to aspiration; involving lungs (patient sedated/intubated; acute respiratory failure with hypercapnia); heart (on pressor, acute on chronic G2DD), kidneys (BUN/Cr: 70/1.64). 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 Patient noted with +bilateral Feet mottling; L. Gluteal intact DTI. Patient remains sedated/intubated; on IV abx and pressor support. Overall, patient with guarded prognosis. Patient's sister/Juliann is identified surrogate. Patient remains a full code at this time.

## 2020-01-16 NOTE — PROGRESS NOTE ADULT - PROBLEM SELECTOR PLAN 3
2/2 aspiration. Patient off sedation; continues pressor support and IV abx. No escalation of care. DNR.
Patient reportedly drinks at least 1 bottle wine and a shot of scotch each day. Patient remains sedated/intubated. Continues Precedex, MVI, folic acid; seroquel added for agitation.
This is the cause of his liver disease along with fatty liver.
2/2 cirrhosis, Alcohol use, Congestive hepatopathy and/ or possible mets to liver  poor prognosis  no further GI intervention  comfort care only at this time
Remains on vent
2/2 liver disease, bacteremia  c/w lactulose   abx as per ID   f/u Head CT

## 2020-01-16 NOTE — PROGRESS NOTE ADULT - PROBLEM SELECTOR PLAN 5
Patient with multiple co-morbidities including G2DD, cirrhosis 2/2 ETOH abuse, + active smoker. Patient's condition continues to deteriorate - now with MOF, on pressor support, remains off sedation but unresponsive, unweanable; new metastatic disease, + issac terminal ulcers. Family aware of grave prognosis. Patient is now DNR; family leaning toward withdrawal of care.

## 2020-01-16 NOTE — PROGRESS NOTE ADULT - SUBJECTIVE AND OBJECTIVE BOX
Time of Visit:  Patient seen and examined.     MEDICATIONS  (STANDING):  atorvastatin 40 milliGRAM(s) Oral at bedtime  cefepime   IVPB      cefepime   IVPB 2000 milliGRAM(s) IV Intermittent every 8 hours  chlorhexidine 0.12% Liquid 15 milliLiter(s) Oral Mucosa two times a day  dextrose 5% + sodium chloride 0.45%. 1000 milliLiter(s) (75 mL/Hr) IV Continuous <Continuous>  folic acid 1 milliGRAM(s) Oral daily  insulin lispro (HumaLOG) corrective regimen sliding scale   SubCutaneous every 6 hours  metoclopramide 5 milliGRAM(s) Oral every 8 hours  metoclopramide Injectable 10 milliGRAM(s) IV Push every 6 hours  midodrine 5 milliGRAM(s) Oral every 8 hours  multivitamin 1 Tablet(s) Oral daily  nystatin Powder 1 Application(s) Topical three times a day  pantoprazole   Suspension 40 milliGRAM(s) Enteral Tube daily  pantoprazole  Injectable 40 milliGRAM(s) IV Push two times a day  phenylephrine    Infusion 2.7 MICROgram(s)/kG/Min (126.563 mL/Hr) IV Continuous <Continuous>  predniSONE   Tablet 40 milliGRAM(s) Oral daily  QUEtiapine 25 milliGRAM(s) Oral every 12 hours  sodium zirconium cyclosilicate 5 Gram(s) Oral every 8 hours      MEDICATIONS  (PRN):  fentaNYL    Injectable 25 MICROGram(s) IV Push every 4 hours PRN Severe Pain (7 - 10)  lactulose Syrup 30 Gram(s) Oral every 8 hours PRN Titrate upto 3 BMs  morphine  - Injectable 4 milliGRAM(s) IV Push every 4 hours PRN respiratory distress  sodium chloride 0.9% lock flush 10 milliLiter(s) IV Push every 1 hour PRN Pre/post blood products, medications, blood draw, and to maintain line patency       Medications up to date at time of exam.      PHYSICAL EXAMINATION:  Vital Signs Last 24 Hrs  T(C): 36.7 (16 Jan 2020 07:00), Max: 37.4 (15 Luiz 2020 21:00)  T(F): 98 (16 Jan 2020 07:00), Max: 99.4 (15 Luiz 2020 21:00)  HR: 97 (16 Jan 2020 11:15) (92 - 139)  BP: 94/53 (16 Jan 2020 11:15) (64/52 - 125/76)  BP(mean): 61 (16 Jan 2020 11:15) (33 - 94)  RR: 33 (16 Jan 2020 11:15) (15 - 39)  SpO2: 85% (16 Jan 2020 11:15) (85% - 100%)  Mode: AC/ CMV (Assist Control/ Continuous Mandatory Ventilation)  RR (machine): 12  TV (machine): 500  FiO2: 60  PEEP: 5  ITime: 1  MAP: 14  PIP: 32    General; On vent support. Unresponsive to tactile and verbal stimuli.      HEENT: Head is normocephalic and atraumatic. No nasal tenderness. + ETT , + NGT.  Mucous membranes are moist.     NECK: Supple, no palpable adenopathy.    LUNGS: Clear to auscultation, no wheezing, rales, or rhonchi. No use of accessory muscle. On vent support to AC setting.      HEART: S1 S2 Regular rate and No JVD.    ABDOMEN: Soft, nontender, and nondistended.  Active bowel sounds .    EXTREMITIES: Total care. Non ambulatory.     NEUROLOGIC: Unresponsive to tactile and verbal stimuli.          LABS:                        11.6   4.97  )-----------( 25       ( 16 Jan 2020 04:19 )             36.5     01-16    142  |  109<H>  |  95<H>  ----------------------------<  89  6.0<H>   |  23  |  2.15<H>    Ca    11.3<H>      16 Jan 2020 04:19  Phos  3.9     01-16  Mg     2.4     01-16    TPro  5.7<L>  /  Alb  1.5<L>  /  TBili  8.2<H>  /  DBili  x   /  AST  344<H>  /  ALT  188<H>  /  AlkPhos  213<H>  01-15    PT/INR - ( 15 Luiz 2020 09:07 )   PT: 16.0 sec;   INR: 1.43 ratio             ABG - ( 16 Jan 2020 04:21 )  pH, Arterial: 7.30  pH, Blood: x     /  pCO2: 48    /  pO2: 95    / HCO3: 23    / Base Excess: -3.3  /  SaO2: 96          RADIOLOGY & ADDITIONAL STUDIES:  EKG:   CXR: < from: Xray Chest 1 View- PORTABLE-Routine (01.16.20 @ 10:04) >  PROCEDURE DATE:  01/16/2020          INTERPRETATION:  Portable chest x-ray    Indication: ICU follow-up.    Portable chest x-ray is compared to a previous examination dated 1/15/2020.    Impression: Stable ET tube. NG tube terminates beyond the GE junction. Previously noted right subclavian central venous catheter has been removed.    Right upper lobe density has cleared. New airspace opacifications in the right lower lung zone.    New mild bilateral pleural effusions.    No evidence for pneumothorax.    Stable cardiac silhouette.      IMPRESSION: 69y Male PAST MEDICAL & SURGICAL HISTORY:  HTN (hypertension)  Atrial fibrillation  No significant past surgical history     Impression: 70 Y/O Male who appears older than stated age initially admitted to medical floor for CHF exacerbation, afib with RVR, alcohol withdrawal and sepsis 2/2 cellulitis. Patient is AAOx2-3 at baseline, noted lethargic by nurse . Patient is on CIWA protocol and received Ativan Iv push . RRT was called for respiratory distress and lethargy, patient is minimally arousable that time to sternal rub, not able to follow commands.  + Aspiration Pneumonia . On ICU and  intubated on 1/5 due to Acute Hypercapnic Respiratory failure.     Suggestion;  On  vent support to setting AC 14  FIO2 40% Peep 5.   Oral hygiene care with Chlorhexidine.   Turning and repositioning.   Continue antibiotic as per ID recommendation.   Aspiration precautions with HOB elevation.  Poor prognosis and per ICU plan for removal of intubation once family is here.

## 2020-01-16 NOTE — PROGRESS NOTE ADULT - PROBLEM SELECTOR PROBLEM 2
Abnormal LFTs (liver function tests)
Metastatic disease
Septic shock
Abnormal LFTs (liver function tests)
Alcohol use
Encephalopathy
Alcohol use

## 2020-01-16 NOTE — ADVANCED PRACTICE NURSE CONSULT - RECOMMEDATIONS
-Clean all wounds with normal saline and apply skin prep to the surrounding skin  -Apply TRIAD Moisture Barrier Cream to the Bilateral Gluteus, Perianal, L. Flank, and Scrotal area b.i.d. PRN  -Protect the Bilateral Heels using an ABD pad, prior to application of heel protectors  -Continue to elevate/float the patients heels using heel protectors and reposition the patient Q 2hrs using wedges or pillows

## 2020-01-16 NOTE — PROGRESS NOTE ADULT - PROBLEM SELECTOR PLAN 2
Head CT findings suggestive of recent small acute and/or subacute infarct.  comfort measures with no escalation of care  palliative on board  Family meeting today

## 2020-01-16 NOTE — CHART NOTE - NSCHARTNOTEFT_GEN_A_CORE
Spoken to Family  over the phone and explained about the  possible Bronchogenic cancer, Multi organ failure and poor prognosis of the patient. Family agreed on "NO ESCALATION OF CARE". Plan to remove the Intubation tube after the family visits him bedside. No further labs and Transfusions on patient.   Pressors Phenylepinephrin  fixed  for 2.7mcg/kg.  No titration of pressors, may titrate down if needed.

## 2020-01-16 NOTE — CONSULT NOTE ADULT - ASSESSMENT
68 y/o male with PMHx of afib (on Pradaxa), HTN and alcohol abuse presents to the ED with chief complaint of weakness and shortness of breath. Patient is initially admitted to medical floor for CHF exacerbation, afib with RVR, alcohol withdrawal and sepsis 2/2 cellulitis. Patient is AAOx2-3 at baseline, noted lethargic by nurse this morning. Patient is on CIWA protocol and received total 8 mg of ativan Iv push in last 24 hours. RRT was called for respiratory distress and lethargy, patient is minimally arousable to sternal rub, not able to follow commands.     Plan:     Neuro:   AAOx2-3 at baseline   noted lethargic   likely combination form hypercarbia and medication induced (on ativan as per CIWA)  will admit to ICU - will need intubation and vent     Respi:   Acute hypercapnic respiratory failure:   likely from combination of CHF and medication induced form ativan   AB.38/55/99 on ventimask   lethargic + using accessory muscles   admit to ICU - will likely need intubation and vent support   follow post intubation chest xray and ABG     CVS:   # Afib:   history of Afib   rate controlled with metoprolol, on pradexa for anticoagulation   will hold pradexa for now   will consider starting heparin drip     CHF:  admitted with CHF exacerbation   on lasix 40 mg IV push daily   will continue lasix for now   stict I/O and daily weight   follow echo    GI:   NPO for now  start on TF after NG placement     # Transaminitis:    /  on admission -stable   tbili 4.3 on admission  etiology unclear at this time  hepatitis panel - negative   CT abdomen shows now CBD dilatation, but does show hypodense lesions  f/u CEA level  f/u dedicated US abdomen to assess RUQ  GI consulted - Dr. Martel.  Heme:  # Thrombocytopenia.    Platelets on admission 59 -> 55 -> 44  likely 2/2 to sepsis  trend daily CBC     Endo:   #DM:   HbA1c of 6.5  sliding scale q6 while NPO     ID:  # cellulitis:   abdominal cellulitis on CT A/p   on amoxicilliln - continue     Renal:   no acute issues     PPx:   was on pradexa - holding   protonix IV for GI  prophylaxis
69 year old male, alcoholic wasa dmitted for alcoholic hepatits, alcohol withdrawal, resp failure, CHF, Afib, right hilar mass, and sepsis.  He has leukopenia and worsening thrombocytopenia and GIB.
Patient has been a heavy drinker. LFT abnormalities can be a combo in this case of cirrhosis, Alcohol use, Congestive hepatopathy and possible mets to liver. CEA at 13.4 HCC a possibility too and Need AFP drawn. Liver nodular on appearance
A/P:  1.HYPONATERMIA: Hypervolemic, most likley secondary to chf/liver ds induced  -pts Na is improving slowly  -suggest to continue current care with diuretics and fluid restriction to 1000 ml per day  -f/u Na level daily  2.HYPERCALCEMIA: mild , secondary to ? immobilization vs  hypercalcemia of malignancy?  -suggest to continue Lasix   -f/u ca level daily  -suggest malignancy w/u if ca remains high  3.EDEMA:multifactorial like chf/liver ds  -avoid iv fluid  Rest as per medical team and GI

## 2020-01-16 NOTE — EEG REPORT - NS EEG TEXT BOX
Overnight continuous EEG only shows generalized background slowing and voltage attenuation, but no seizure tendency or status epilepticus. Full report to follow. Continuous EEG may be disconnected by this afternoon. [facility]  Comprehensive Epilepsy Center  Report of Continuous Video EEG    [facility]: [address] , [address], [place] [address], Ph#: [phone]  [facility]: [address] [place] [address], Ph#: [phone]  Office: [address] [facility], [facility] [address], [place] [address] Ph#: [phone]    Patient Name: CONNER FLORES    Age: 69 year, : 1950  Patient ID: -, MRN #: [id number], Roque: [facility] ICU 1  Referring Physician: -  EEG #: 2020-30    Study Date: 1/15/2020     Study Information:    EEG Recording Technique:  The patient underwent continuous Video-EEG monitoring, using Telemetry System hardware on the XLTek Digital System. EEG and video data were stored on a computer hard drive with important events saved in digital archive files. The material was reviewed by a physician (electroencephalographer / epileptologist) on a daily basis. Munir and seizure detection algorithms were utilized and reviewed. An EEG Technician attended to the patient, and was available throughout daytime work hours.  The epilepsy center neurologist was available in person or on call 24-hours per day.    EEG Placement and Labeling of Electrodes:  The EEG was performed utilizing 20 channel referential EEG connections (coronal over temporal over parasagittal montage) using all standard 10-20 electrode placements with EKG, with additional electrodes placed in the inferior temporal region using the modified 10-10 montage electrode placements for elective admissions, or if deemed necessary. Recording was at a sampling rate of 256 samples per second per channel. Time synchronized digital video recording was done simultaneously with EEG recording. A low light infrared camera was used for low light recording.     History:  69M was admitted on 1/3/20 weakness, shortness of breath, and recurrence of falls with possible loss of consciousness. He was found to have atrial fibrillation with rapid ventricular response and started on anticoagulation. He has had bloody bowel movement since then requiring transfusion. Today, he has been less responsive than at baseline, despite being intubated without sedation.    Medication	  No AEDs	    Interpretation:    [Abbreviation Key:  PDR=alpha rhythm/posterior dominant rhythm. A-P=anterior posterior gradient.  Amplitude: ‘very low’:<20; ‘low’:20-50; ‘medium’:; ‘high’:>200uV.  Persistence for periodic/rhythmic patterns (% of epoch) ‘rare’:<1%; ‘occasional’:1-10%; ‘frequent’:10-50%; ‘abundant’:50-90%; ‘continuous’:>90%.  Persistence for sporadic discharges: ‘rare’:<1/hr; ‘occasional’:1/min-1/hr; ‘frequent’:>1/min; ‘abundant’:>1/10 sec.  GRDA=generalized rhythmic delta activity, LRDA=lateralized rhythmic delta activity, TIRDA=temporal intermittent rhythmic delta activity, FIRDA=frontal intermittent rhythmic activity. LPD=PLED=lateralized periodic discharges, GPD=generalized periodic discharges, BiPDs=BiPLEDs=bilateral independent periodic epileptiform discharges, SIRPID=stimulus induced rhythmic, periodic, or ictal appearing discharges.  Modifiers: +F=with fast component, +S=with spike component, +R=with rhythmic component.  S-B=burst suppression pattern.  PFA: paroxysmal fast activity. Max=maximal. N1-drowsy, N2-stage II sleep, N3-slow wave sleep.  HV=hyperventilation, PS=photic stimulation]      Startin/15/2020    Daily EEG Visual Analysis  FINDINGS: The background was disorganized with voltage attenuation, capturing the drowsy and asleep states. No posterior dominant rhythm was detected.    Background Slowing:  Generalized slowing: Present.  Focal Slowing: None was present.    Sleep Background:  Drowsiness was characterized by fragmentation, attenuation, and slowing of the background activity.    Sleep was characterized by the presence of vertex wave, symmetric spindles, and K-complexes.    Other Non-Epileptiform Findings:  None were present.    Interictal Epileptiform Activity:   None were present.    Events:  Clinical events: None recorded.  Seizures: None recorded.    Activation Procedures:   Hyperventilation was not performed.    Photic stimulation was not performed.    Artifacts:  Intermittent myogenic and movement artifacts were noted.    ECG:  The heart rate on single channel ECG was predominantly [id number] and 140 BPM.    EEG Summary / Classification:  Abnormal EEG in an altered patient.  - Generalized background slowing  - Discontinuity    EEG Impression / Clinical Correlate:  Normal prolonged EEG study.  No epileptic pattern or seizure seen.      ________________________________________    Vin Hassan MD  Attending Physician, [facility]  Comprehensive Epilepsy Center  Report of Continuous Video EEG    [facility]: [address] , [address], [place] [address], Ph#: [phone]  [facility]: [address] [place] [address], Ph#: [phone]  Office: 1 [facility], [facility] [address], [place] [address] Ph#: [phone]    Patient Name: CONNER FLORES    Age: 69 year, : 1950  Patient ID: -, MRN #: [id number], Roque: [facility] ICU 1  Referring Physician: -  EEG #: 2020-30    Study Date: 1/15/2020     Study Information:    EEG Recording Technique:  The patient underwent continuous Video-EEG monitoring, using Telemetry System hardware on the XLTek Digital System. EEG and video data were stored on a computer hard drive with important events saved in digital archive files. The material was reviewed by a physician (electroencephalographer / epileptologist) on a daily basis. Munir and seizure detection algorithms were utilized and reviewed. An EEG Technician attended to the patient, and was available throughout daytime work hours.  The epilepsy center neurologist was available in person or on call 24-hours per day.    EEG Placement and Labeling of Electrodes:  The EEG was performed utilizing 20 channel referential EEG connections (coronal over temporal over parasagittal montage) using all standard 10-20 electrode placements with EKG, with additional electrodes placed in the inferior temporal region using the modified 10-10 montage electrode placements for elective admissions, or if deemed necessary. Recording was at a sampling rate of 256 samples per second per channel. Time synchronized digital video recording was done simultaneously with EEG recording. A low light infrared camera was used for low light recording.     History:  69M was admitted on 1/3/20 weakness, shortness of breath, and recurrence of falls with possible loss of consciousness. He was found to have atrial fibrillation with rapid ventricular response and started on anticoagulation. He has had bloody bowel movement since then requiring transfusion. Today, he has been less responsive than at baseline, despite being intubated without sedation.    Medication	  No AEDs	    Interpretation:    [Abbreviation Key:  PDR=alpha rhythm/posterior dominant rhythm. A-P=anterior posterior gradient.  Amplitude: ‘very low’:<20; ‘low’:20-50; ‘medium’:; ‘high’:>200uV.  Persistence for periodic/rhythmic patterns (% of epoch) ‘rare’:<1%; ‘occasional’:1-10%; ‘frequent’:10-50%; ‘abundant’:50-90%; ‘continuous’:>90%.  Persistence for sporadic discharges: ‘rare’:<1/hr; ‘occasional’:1/min-1/hr; ‘frequent’:>1/min; ‘abundant’:>1/10 sec.  GRDA=generalized rhythmic delta activity, LRDA=lateralized rhythmic delta activity, TIRDA=temporal intermittent rhythmic delta activity, FIRDA=frontal intermittent rhythmic activity. LPD=PLED=lateralized periodic discharges, GPD=generalized periodic discharges, BiPDs=BiPLEDs=bilateral independent periodic epileptiform discharges, SIRPID=stimulus induced rhythmic, periodic, or ictal appearing discharges.  Modifiers: +F=with fast component, +S=with spike component, +R=with rhythmic component.  S-B=burst suppression pattern.  PFA: paroxysmal fast activity. Max=maximal. N1-drowsy, N2-stage II sleep, N3-slow wave sleep.  HV=hyperventilation, PS=photic stimulation]      Startin/15/2020  19:49:44  Duration: 13:40:16    Daily EEG Visual Analysis  FINDINGS: The background was disorganized with voltage attenuation, capturing the drowsy and asleep states. No posterior dominant rhythm was detected.    Background Slowing:  Generalized slowing: Present.  Focal Slowing: None was present.    Sleep Background:  Drowsiness was characterized by fragmentation, attenuation, and slowing of the background activity.    Sleep was characterized by the presence of vertex wave, symmetric spindles, and K-complexes.    Other Non-Epileptiform Findings:  None were present.    Interictal Epileptiform Activity:   None were present.    Events:  Clinical events: None recorded.  Seizures: None recorded.    Activation Procedures:   Hyperventilation was not performed.    Photic stimulation was not performed.    Artifacts:  Intermittent myogenic and movement artifacts were noted.    ECG:  The heart rate on single channel ECG was predominantly [id number] and 140 BPM.    EEG Summary / Classification:  Abnormal EEG in an altered patient.  - Generalized background slowing  - Discontinuity    EEG Impression / Clinical Correlate:  Normal prolonged EEG study.  No epileptic pattern or seizure seen.      ________________________________________    Vin Hassan MD  Attending Physician, [facility]

## 2020-01-16 NOTE — PROGRESS NOTE ADULT - PROBLEM SELECTOR PLAN 1
Stable H/H  CT findings suggestive of hilar mass  family does not want escalation of care   no further lab draws  comfort measures

## 2020-01-16 NOTE — CONSULT NOTE ADULT - CONSULT REQUESTED DATE/TIME
04-Jan-2020 17:32
04-Jan-2020 18:54
05-Jan-2020 10:49
05-Jan-2020 15:02
09-Jan-2020 16:36
15-Luiz-2020 15:15
16-Jan-2020 10:03
04-Jan-2020 08:17

## 2020-01-16 NOTE — ADVANCED PRACTICE NURSE CONSULT - ASSESSMENT
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   -There is evidence of Eleno Terminal Ulcer to the L. Flank (x4) area without drainage  -There is evidence of purplish discoloration to the Perianal area  -There is evidence of Moisture Associated Skin Damage to the Scrotal and Groin area  -The patient is being treated for multiple organ system failure, sepsis, transaminitis, liver cirrhosis/metastasis, A. Fib, HTN, EtOH Withdrawal, respiratory failure, CHF, thrombocytopenia, diabetes, abdominal cellulitis  -The patient is on a low air loss bed with pressure injury prevention resources in place  -There is evidence of skin failure  -The patient is being followed by Palliative Care

## 2020-01-16 NOTE — PROGRESS NOTE ADULT - ASSESSMENT
70 y/o male with PMHx of afib (on Pradaxa), HTN and alcohol abuse presents to the ED with chief complaint of weakness and shortness of breath. Patient is initially admitted to medical floor for CHF exacerbation, afib with RVR, alcohol withdrawal and sepsis 2/2 cellulitis. Patient is AAOx2-3 at baseline, noted lethargic by nurse this morning. Patient is on CIWA protocol and received total 8 mg of ativan Iv push in last 24 hours. RRT was called for respiratory distress and lethargy, patient is minimally arousable to sternal rub, not able to follow commands.     Plan:     Neuro:   AAOx2-3 at baseline   Pt noted to be less responsive and more lethargic   Intubated in ICU on 1/5. Spoken to family about poor prognosis. Family agreed on comfort care.     Respi:   Acute hypercapnic respiratory failure: CHF exc VS Benzo overdose  ct chest, s/o new Bronchogenic Cancer.  no Escalation of care.   Extubation after the family arrives.     CVS:   # Hypotensive. maxed on pressor support.  # Afib:   history of Afib   AC held for Thrombocytopenia.  #CHF:  admitted with CHF exacerbation   On lasix 40mg iv bd   stict I/O and daily weight   echo- EF - 55%  # tachycardia: ranging between 110- 130.    GI:   Pt having bloody BM and brownish oral secretions  Tube fed held   # Transaminitis: AST/ALT/ALP elevated 2/2 shock liver  hepatitis panel - negative   Repeat CT abdomen - Multiple  hypodense lesions in liver 2/2 to mets?  GI consulted - Dr. Martel.    Heme:  # Thrombocytopenia and Leucopenia  Platelets on admission 59 -> 55 -> 44->48->52->46->34->33->35->25  Blood smear 1/10- WNL.   slight elevation in D dimer 2200, frey 13- vehit panel - negative  No further lab     Endo:   #DM:   HbA1c of 6.5  sliding scale q6 w    ID:  #aspiration pna  Fever- resolving   On Vanco and cefepime  bld cx- GN rods 1/15 klebsiella   Sputum cx- GP cocci in pairs       Renal:   Creatine levels trended up 2.15   urine lytes- WNL  hypernatrmia- resolving  Hypercalcemia+ .    PPx:   was on pradexa - holding   protonix IV for GI  prophylaxis

## 2020-01-16 NOTE — PROGRESS NOTE ADULT - PROBLEM SELECTOR PLAN 6
Spoke with patient's sister/Juliann ([phone] / [phone]) on the phone; Dr. Yee present. Discussed status; CT findings. Family acknowledges patient with grave prognosis. Discussed options for biopsy and withdrawal of care. Acknowledging "even if by some miracle he overcomes this, he will still have no quality of life." Patient was a full code; now family requesting DNR / DNI / no escalation of care / + comfort measures. Family leaning toward withdrawal of care - she will be in later today with additional family members to further discuss. New MOLST completed as per surrogate's wishes: DNR / DNI / comfort measures / no escalation of care. All questions answered; supportive counseling provided.

## 2020-01-17 LAB
-  AMIKACIN: SIGNIFICANT CHANGE UP
-  AMIKACIN: SIGNIFICANT CHANGE UP
-  AMOXICILLIN/CLAVULANIC ACID: SIGNIFICANT CHANGE UP
-  AMPICILLIN/SULBACTAM: SIGNIFICANT CHANGE UP
-  AMPICILLIN/SULBACTAM: SIGNIFICANT CHANGE UP
-  AMPICILLIN: SIGNIFICANT CHANGE UP
-  AMPICILLIN: SIGNIFICANT CHANGE UP
-  AZTREONAM: SIGNIFICANT CHANGE UP
-  AZTREONAM: SIGNIFICANT CHANGE UP
-  CEFAZOLIN: SIGNIFICANT CHANGE UP
-  CEFAZOLIN: SIGNIFICANT CHANGE UP
-  CEFEPIME: SIGNIFICANT CHANGE UP
-  CEFEPIME: SIGNIFICANT CHANGE UP
-  CEFOXITIN: SIGNIFICANT CHANGE UP
-  CEFOXITIN: SIGNIFICANT CHANGE UP
-  CEFTRIAXONE: SIGNIFICANT CHANGE UP
-  CEFTRIAXONE: SIGNIFICANT CHANGE UP
-  CIPROFLOXACIN: SIGNIFICANT CHANGE UP
-  CIPROFLOXACIN: SIGNIFICANT CHANGE UP
-  ERTAPENEM: SIGNIFICANT CHANGE UP
-  ERTAPENEM: SIGNIFICANT CHANGE UP
-  GENTAMICIN: SIGNIFICANT CHANGE UP
-  GENTAMICIN: SIGNIFICANT CHANGE UP
-  IMIPENEM: SIGNIFICANT CHANGE UP
-  LEVOFLOXACIN: SIGNIFICANT CHANGE UP
-  LEVOFLOXACIN: SIGNIFICANT CHANGE UP
-  MEROPENEM: SIGNIFICANT CHANGE UP
-  MEROPENEM: SIGNIFICANT CHANGE UP
-  PIPERACILLIN/TAZOBACTAM: SIGNIFICANT CHANGE UP
-  PIPERACILLIN/TAZOBACTAM: SIGNIFICANT CHANGE UP
-  TOBRAMYCIN: SIGNIFICANT CHANGE UP
-  TOBRAMYCIN: SIGNIFICANT CHANGE UP
-  TRIMETHOPRIM/SULFAMETHOXAZOLE: SIGNIFICANT CHANGE UP
-  TRIMETHOPRIM/SULFAMETHOXAZOLE: SIGNIFICANT CHANGE UP
CULTURE RESULTS: SIGNIFICANT CHANGE UP
CULTURE RESULTS: SIGNIFICANT CHANGE UP
METHOD TYPE: SIGNIFICANT CHANGE UP
METHOD TYPE: SIGNIFICANT CHANGE UP
ORGANISM # SPEC MICROSCOPIC CNT: SIGNIFICANT CHANGE UP
SPECIMEN SOURCE: SIGNIFICANT CHANGE UP
SPECIMEN SOURCE: SIGNIFICANT CHANGE UP

## 2020-01-20 LAB
CORTICOSTEROID BINDING GLOBULIN RESULT: 1.8 MG/DL — SIGNIFICANT CHANGE UP
CORTIS F/TOTAL MFR SERPL: 64 % — SIGNIFICANT CHANGE UP
CORTIS SERPL-MCNC: 34 UG/DL — HIGH
CORTISOL, FREE RESULT: 22 UG/DL — HIGH

## 2020-01-22 LAB — PTH RELATED PROT SERPL-MCNC: <2 PMOL/L — SIGNIFICANT CHANGE UP

## 2020-02-19 NOTE — ED PROVIDER NOTE - CADM POA PRESS ULCER
Call placed to patient's mother for UC f/u. Right eye is improved but now the left is affected. Mother is placing drops to bilateral eyes.  Patient is having a difficult time taking drops but will continue to administer medication.  Reviewed eye ointment may be more difficult for patient to take. Mother will f/u with PCP if no further improvement.   
No
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
